# Patient Record
Sex: MALE | Race: WHITE | NOT HISPANIC OR LATINO
[De-identification: names, ages, dates, MRNs, and addresses within clinical notes are randomized per-mention and may not be internally consistent; named-entity substitution may affect disease eponyms.]

---

## 2017-01-04 ENCOUNTER — APPOINTMENT (OUTPATIENT)
Dept: CARDIOTHORACIC SURGERY | Facility: CLINIC | Age: 52
End: 2017-01-04

## 2017-01-04 VITALS
TEMPERATURE: 98.7 F | DIASTOLIC BLOOD PRESSURE: 73 MMHG | HEIGHT: 71 IN | SYSTOLIC BLOOD PRESSURE: 142 MMHG | RESPIRATION RATE: 18 BRPM | OXYGEN SATURATION: 94 % | HEART RATE: 73 BPM | WEIGHT: 280 LBS | BODY MASS INDEX: 39.2 KG/M2

## 2017-04-03 ENCOUNTER — APPOINTMENT (OUTPATIENT)
Dept: HEART AND VASCULAR | Facility: CLINIC | Age: 52
End: 2017-04-03

## 2017-05-12 PROBLEM — Z09 POSTOP CHECK: Status: RESOLVED | Noted: 2017-01-03 | Resolved: 2017-05-12

## 2017-05-12 PROBLEM — G89.18 POSTOPERATIVE PAIN: Status: RESOLVED | Noted: 2017-01-06 | Resolved: 2017-05-12

## 2017-05-16 ENCOUNTER — FORM ENCOUNTER (OUTPATIENT)
Age: 52
End: 2017-05-16

## 2017-05-17 ENCOUNTER — OUTPATIENT (OUTPATIENT)
Dept: OUTPATIENT SERVICES | Facility: HOSPITAL | Age: 52
LOS: 1 days | End: 2017-05-17
Payer: COMMERCIAL

## 2017-05-17 ENCOUNTER — APPOINTMENT (OUTPATIENT)
Dept: CARDIOTHORACIC SURGERY | Facility: CLINIC | Age: 52
End: 2017-05-17

## 2017-05-17 DIAGNOSIS — Z98.890 OTHER SPECIFIED POSTPROCEDURAL STATES: Chronic | ICD-10-CM

## 2017-05-17 DIAGNOSIS — I35.0 NONRHEUMATIC AORTIC (VALVE) STENOSIS: ICD-10-CM

## 2017-05-17 DIAGNOSIS — Z09 ENCOUNTER FOR FOLLOW-UP EXAMINATION AFTER COMPLETED TREATMENT FOR CONDITIONS OTHER THAN MALIGNANT NEOPLASM: ICD-10-CM

## 2017-05-17 DIAGNOSIS — G89.18 OTHER ACUTE POSTPROCEDURAL PAIN: ICD-10-CM

## 2017-05-17 DIAGNOSIS — N62 HYPERTROPHY OF BREAST: Chronic | ICD-10-CM

## 2017-05-17 PROCEDURE — 93306 TTE W/DOPPLER COMPLETE: CPT | Mod: 26

## 2017-05-17 PROCEDURE — 93306 TTE W/DOPPLER COMPLETE: CPT

## 2017-06-13 RX ORDER — IBUPROFEN 600 MG/1
600 TABLET, FILM COATED ORAL
Refills: 0 | Status: COMPLETED | COMMUNITY
Start: 2017-01-04 | End: 2017-06-13

## 2017-10-12 ENCOUNTER — INPATIENT (INPATIENT)
Facility: HOSPITAL | Age: 52
LOS: 1 days | Discharge: HOME | DRG: 247 | End: 2017-10-14
Attending: INTERNAL MEDICINE | Admitting: INTERNAL MEDICINE
Payer: SELF-PAY

## 2017-10-12 VITALS
HEART RATE: 74 BPM | SYSTOLIC BLOOD PRESSURE: 149 MMHG | OXYGEN SATURATION: 98 % | DIASTOLIC BLOOD PRESSURE: 93 MMHG | TEMPERATURE: 98 F | RESPIRATION RATE: 18 BRPM

## 2017-10-12 DIAGNOSIS — Z98.890 OTHER SPECIFIED POSTPROCEDURAL STATES: Chronic | ICD-10-CM

## 2017-10-12 DIAGNOSIS — N62 HYPERTROPHY OF BREAST: Chronic | ICD-10-CM

## 2017-10-12 LAB
ALBUMIN SERPL ELPH-MCNC: 4.2 G/DL — SIGNIFICANT CHANGE UP (ref 3.3–5)
ALP SERPL-CCNC: 89 U/L — SIGNIFICANT CHANGE UP (ref 40–120)
ALT FLD-CCNC: 43 U/L — SIGNIFICANT CHANGE UP (ref 10–45)
ANION GAP SERPL CALC-SCNC: 14 MMOL/L — SIGNIFICANT CHANGE UP (ref 5–17)
APTT BLD: 30.5 SEC — SIGNIFICANT CHANGE UP (ref 27.5–37.4)
AST SERPL-CCNC: 114 U/L — HIGH (ref 10–40)
BASOPHILS NFR BLD AUTO: 0.1 % — SIGNIFICANT CHANGE UP (ref 0–2)
BILIRUB SERPL-MCNC: 0.4 MG/DL — SIGNIFICANT CHANGE UP (ref 0.2–1.2)
BUN SERPL-MCNC: 19 MG/DL — SIGNIFICANT CHANGE UP (ref 7–23)
CALCIUM SERPL-MCNC: 9.4 MG/DL — SIGNIFICANT CHANGE UP (ref 8.4–10.5)
CHLORIDE SERPL-SCNC: 96 MMOL/L — SIGNIFICANT CHANGE UP (ref 96–108)
CK MB CFR SERPL CALC: 60.2 NG/ML — HIGH (ref 0–6.7)
CO2 SERPL-SCNC: 25 MMOL/L — SIGNIFICANT CHANGE UP (ref 22–31)
CREAT SERPL-MCNC: 1.1 MG/DL — SIGNIFICANT CHANGE UP (ref 0.5–1.3)
EOSINOPHIL NFR BLD AUTO: 0.8 % — SIGNIFICANT CHANGE UP (ref 0–6)
GLUCOSE SERPL-MCNC: 120 MG/DL — HIGH (ref 70–99)
HCT VFR BLD CALC: 44.7 % — SIGNIFICANT CHANGE UP (ref 39–50)
HGB BLD-MCNC: 15.2 G/DL — SIGNIFICANT CHANGE UP (ref 13–17)
INR BLD: 1.08 — SIGNIFICANT CHANGE UP (ref 0.88–1.16)
LYMPHOCYTES # BLD AUTO: 17.2 % — SIGNIFICANT CHANGE UP (ref 13–44)
MCHC RBC-ENTMCNC: 28.4 PG — SIGNIFICANT CHANGE UP (ref 27–34)
MCHC RBC-ENTMCNC: 34 G/DL — SIGNIFICANT CHANGE UP (ref 32–36)
MCV RBC AUTO: 83.6 FL — SIGNIFICANT CHANGE UP (ref 80–100)
MONOCYTES NFR BLD AUTO: 9.5 % — SIGNIFICANT CHANGE UP (ref 2–14)
NEUTROPHILS NFR BLD AUTO: 72.4 % — SIGNIFICANT CHANGE UP (ref 43–77)
PLATELET # BLD AUTO: 178 K/UL — SIGNIFICANT CHANGE UP (ref 150–400)
POTASSIUM SERPL-MCNC: 4 MMOL/L — SIGNIFICANT CHANGE UP (ref 3.5–5.3)
POTASSIUM SERPL-SCNC: 4 MMOL/L — SIGNIFICANT CHANGE UP (ref 3.5–5.3)
PROT SERPL-MCNC: 7.5 G/DL — SIGNIFICANT CHANGE UP (ref 6–8.3)
PROTHROM AB SERPL-ACNC: 12 SEC — SIGNIFICANT CHANGE UP (ref 9.8–12.7)
RBC # BLD: 5.35 M/UL — SIGNIFICANT CHANGE UP (ref 4.2–5.8)
RBC # FLD: 12 % — SIGNIFICANT CHANGE UP (ref 10.3–16.9)
SODIUM SERPL-SCNC: 135 MMOL/L — SIGNIFICANT CHANGE UP (ref 135–145)
TROPONIN T SERPL-MCNC: 0.53 NG/ML — CRITICAL HIGH (ref 0–0.01)
WBC # BLD: 12.6 K/UL — HIGH (ref 3.8–10.5)
WBC # FLD AUTO: 12.6 K/UL — HIGH (ref 3.8–10.5)

## 2017-10-12 PROCEDURE — 99223 1ST HOSP IP/OBS HIGH 75: CPT

## 2017-10-12 PROCEDURE — 99285 EMERGENCY DEPT VISIT HI MDM: CPT | Mod: 25

## 2017-10-12 PROCEDURE — 74174 CTA ABD&PLVS W/CONTRAST: CPT | Mod: 26

## 2017-10-12 PROCEDURE — 93010 ELECTROCARDIOGRAM REPORT: CPT

## 2017-10-12 PROCEDURE — 71275 CT ANGIOGRAPHY CHEST: CPT | Mod: 26

## 2017-10-12 RX ORDER — ASPIRIN/CALCIUM CARB/MAGNESIUM 324 MG
325 TABLET ORAL ONCE
Qty: 0 | Refills: 0 | Status: COMPLETED | OUTPATIENT
Start: 2017-10-12 | End: 2017-10-12

## 2017-10-12 RX ORDER — HEPARIN SODIUM 5000 [USP'U]/ML
INJECTION INTRAVENOUS; SUBCUTANEOUS
Qty: 25000 | Refills: 0 | Status: DISCONTINUED | OUTPATIENT
Start: 2017-10-12 | End: 2017-10-13

## 2017-10-12 RX ORDER — ASPIRIN/CALCIUM CARB/MAGNESIUM 324 MG
81 TABLET ORAL DAILY
Qty: 0 | Refills: 0 | Status: DISCONTINUED | OUTPATIENT
Start: 2017-10-13 | End: 2017-10-14

## 2017-10-12 RX ADMIN — Medication 325 MILLIGRAM(S): at 23:20

## 2017-10-12 RX ADMIN — HEPARIN SODIUM 1000 UNIT(S)/HR: 5000 INJECTION INTRAVENOUS; SUBCUTANEOUS at 23:26

## 2017-10-12 NOTE — ED ADULT NURSE NOTE - OBJECTIVE STATEMENT
Pt presents to ED c/o left sided chest pain since 5am. He describes it as "deep, tight muscular pain." associated symptoms include nausea without vomiting. pmh sleep apnea with cpap and aortic aneurysm with repair december 2016.

## 2017-10-12 NOTE — ED PROVIDER NOTE - PROGRESS NOTE DETAILS
noted elevated trop, repeat EKG done, no acute changes to prior.  pt brought to CTA to ro dissection immediately. CTA no acute findings for dissections, pt remains well appearing but still w/ pain, asa given and heparin started, admit for cardiology evaluation relayed to inpatient team that pt needs CPAP for sleep

## 2017-10-12 NOTE — ED PROVIDER NOTE - OBJECTIVE STATEMENT
52 yom pw left sided cp today, rad to back, constant, no modifying factors, nonexertional, no abd pain.  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  hx of htn and sleep apnea.  nonsmoker, no hx of MI.  sx: cp  a/w: no sob  duration: today  quality: pain  location: left chest  radiation: to back  modifying/eliciting factors:  none

## 2017-10-12 NOTE — ED PROVIDER NOTE - MEDICAL DECISION MAKING DETAILS
cp, no sob, hx of aortic root aneurysm sp repair, will CTA r/o dissection, 0-4 hr cardiac panel, low suspicion for ACS

## 2017-10-12 NOTE — ED ADULT NURSE NOTE - CHPI ED SYMPTOMS NEG
no diaphoresis/no shortness of breath/no syncope/no vomiting/no chills/no fever/no cough/no dizziness

## 2017-10-12 NOTE — ED ADULT NURSE NOTE - PMH
HTN (hypertension)    Hyperlipidemia    Obstructive sleep apnea    Pre-diabetes    Thoracic aortic aneurysm without rupture

## 2017-10-13 ENCOUNTER — TRANSCRIPTION ENCOUNTER (OUTPATIENT)
Age: 52
End: 2017-10-13

## 2017-10-13 DIAGNOSIS — I21.4 NON-ST ELEVATION (NSTEMI) MYOCARDIAL INFARCTION: ICD-10-CM

## 2017-10-13 DIAGNOSIS — R74.0 NONSPECIFIC ELEVATION OF LEVELS OF TRANSAMINASE AND LACTIC ACID DEHYDROGENASE [LDH]: ICD-10-CM

## 2017-10-13 DIAGNOSIS — R73.03 PREDIABETES: ICD-10-CM

## 2017-10-13 DIAGNOSIS — G47.33 OBSTRUCTIVE SLEEP APNEA (ADULT) (PEDIATRIC): ICD-10-CM

## 2017-10-13 DIAGNOSIS — Z98.890 OTHER SPECIFIED POSTPROCEDURAL STATES: Chronic | ICD-10-CM

## 2017-10-13 DIAGNOSIS — I10 ESSENTIAL (PRIMARY) HYPERTENSION: ICD-10-CM

## 2017-10-13 LAB
ALBUMIN SERPL ELPH-MCNC: 4 G/DL — SIGNIFICANT CHANGE UP (ref 3.3–5)
ALP SERPL-CCNC: 86 U/L — SIGNIFICANT CHANGE UP (ref 40–120)
ALT FLD-CCNC: 43 U/L — SIGNIFICANT CHANGE UP (ref 10–45)
ANION GAP SERPL CALC-SCNC: 15 MMOL/L — SIGNIFICANT CHANGE UP (ref 5–17)
APTT BLD: 118 SEC — HIGH (ref 27.5–37.4)
APTT BLD: 36.3 SEC — SIGNIFICANT CHANGE UP (ref 27.5–37.4)
AST SERPL-CCNC: 136 U/L — HIGH (ref 10–40)
BILIRUB DIRECT SERPL-MCNC: <0.2 MG/DL — SIGNIFICANT CHANGE UP (ref 0–0.2)
BILIRUB INDIRECT FLD-MCNC: >0.3 MG/DL — SIGNIFICANT CHANGE UP (ref 0.2–1)
BILIRUB SERPL-MCNC: 0.5 MG/DL — SIGNIFICANT CHANGE UP (ref 0.2–1.2)
BUN SERPL-MCNC: 18 MG/DL — SIGNIFICANT CHANGE UP (ref 7–23)
CALCIUM SERPL-MCNC: 9.1 MG/DL — SIGNIFICANT CHANGE UP (ref 8.4–10.5)
CHLORIDE SERPL-SCNC: 98 MMOL/L — SIGNIFICANT CHANGE UP (ref 96–108)
CHOLEST SERPL-MCNC: 197 MG/DL — SIGNIFICANT CHANGE UP (ref 10–199)
CK MB CFR SERPL CALC: 43.4 NG/ML — HIGH (ref 0–6.7)
CK MB CFR SERPL CALC: 50.9 NG/ML — HIGH (ref 0–6.7)
CK SERPL-CCNC: 812 U/L — HIGH (ref 30–200)
CK SERPL-CCNC: 866 U/L — HIGH (ref 30–200)
CO2 SERPL-SCNC: 24 MMOL/L — SIGNIFICANT CHANGE UP (ref 22–31)
CREAT SERPL-MCNC: 0.99 MG/DL — SIGNIFICANT CHANGE UP (ref 0.5–1.3)
EXTRA LAVENDER TOP TUBE: SIGNIFICANT CHANGE UP
GLUCOSE SERPL-MCNC: 155 MG/DL — HIGH (ref 70–99)
HBA1C BLD-MCNC: 5.9 % — HIGH (ref 4–5.6)
HCT VFR BLD CALC: 43.1 % — SIGNIFICANT CHANGE UP (ref 39–50)
HDLC SERPL-MCNC: 41 MG/DL — SIGNIFICANT CHANGE UP (ref 40–125)
HGB BLD-MCNC: 14.8 G/DL — SIGNIFICANT CHANGE UP (ref 13–17)
INR BLD: 1.1 — SIGNIFICANT CHANGE UP (ref 0.88–1.16)
LIPID PNL WITH DIRECT LDL SERPL: 133 MG/DL — HIGH
MAGNESIUM SERPL-MCNC: 2.1 MG/DL — SIGNIFICANT CHANGE UP (ref 1.6–2.6)
MCHC RBC-ENTMCNC: 28.9 PG — SIGNIFICANT CHANGE UP (ref 27–34)
MCHC RBC-ENTMCNC: 34.3 G/DL — SIGNIFICANT CHANGE UP (ref 32–36)
MCV RBC AUTO: 84.2 FL — SIGNIFICANT CHANGE UP (ref 80–100)
PLATELET # BLD AUTO: 164 K/UL — SIGNIFICANT CHANGE UP (ref 150–400)
POTASSIUM SERPL-MCNC: 4 MMOL/L — SIGNIFICANT CHANGE UP (ref 3.5–5.3)
POTASSIUM SERPL-SCNC: 4 MMOL/L — SIGNIFICANT CHANGE UP (ref 3.5–5.3)
PROT SERPL-MCNC: 7 G/DL — SIGNIFICANT CHANGE UP (ref 6–8.3)
PROTHROM AB SERPL-ACNC: 12.2 SEC — SIGNIFICANT CHANGE UP (ref 9.8–12.7)
RBC # BLD: 5.12 M/UL — SIGNIFICANT CHANGE UP (ref 4.2–5.8)
RBC # FLD: 12.2 % — SIGNIFICANT CHANGE UP (ref 10.3–16.9)
SODIUM SERPL-SCNC: 137 MMOL/L — SIGNIFICANT CHANGE UP (ref 135–145)
TOTAL CHOLESTEROL/HDL RATIO MEASUREMENT: 4.8 RATIO — SIGNIFICANT CHANGE UP (ref 3.4–9.6)
TRIGL SERPL-MCNC: 117 MG/DL — SIGNIFICANT CHANGE UP (ref 10–149)
TROPONIN T SERPL-MCNC: 0.61 NG/ML — CRITICAL HIGH (ref 0–0.01)
TROPONIN T SERPL-MCNC: 0.66 NG/ML — CRITICAL HIGH (ref 0–0.01)
TSH SERPL-MCNC: 0.93 UIU/ML — SIGNIFICANT CHANGE UP (ref 0.35–4.94)
WBC # BLD: 12.1 K/UL — HIGH (ref 3.8–10.5)
WBC # FLD AUTO: 12.1 K/UL — HIGH (ref 3.8–10.5)

## 2017-10-13 PROCEDURE — 93458 L HRT ARTERY/VENTRICLE ANGIO: CPT | Mod: 26,XU

## 2017-10-13 PROCEDURE — 92973 PRQ TRLUML C MCHN ASP THRMBC: CPT | Mod: RC

## 2017-10-13 PROCEDURE — 99233 SBSQ HOSP IP/OBS HIGH 50: CPT

## 2017-10-13 PROCEDURE — 92928 PRQ TCAT PLMT NTRAC ST 1 LES: CPT | Mod: RC

## 2017-10-13 PROCEDURE — 93010 ELECTROCARDIOGRAM REPORT: CPT

## 2017-10-13 RX ORDER — METOPROLOL TARTRATE 50 MG
50 TABLET ORAL DAILY
Qty: 0 | Refills: 0 | Status: DISCONTINUED | OUTPATIENT
Start: 2017-10-13 | End: 2017-10-14

## 2017-10-13 RX ORDER — SODIUM CHLORIDE 9 MG/ML
1000 INJECTION INTRAMUSCULAR; INTRAVENOUS; SUBCUTANEOUS
Qty: 0 | Refills: 0 | Status: DISCONTINUED | OUTPATIENT
Start: 2017-10-13 | End: 2017-10-14

## 2017-10-13 RX ORDER — MORPHINE SULFATE 50 MG/1
2 CAPSULE, EXTENDED RELEASE ORAL ONCE
Qty: 0 | Refills: 0 | Status: DISCONTINUED | OUTPATIENT
Start: 2017-10-13 | End: 2017-10-13

## 2017-10-13 RX ORDER — HEPARIN SODIUM 5000 [USP'U]/ML
1300 INJECTION INTRAVENOUS; SUBCUTANEOUS
Qty: 25000 | Refills: 0 | Status: DISCONTINUED | OUTPATIENT
Start: 2017-10-13 | End: 2017-10-13

## 2017-10-13 RX ORDER — TICAGRELOR 90 MG/1
90 TABLET ORAL
Qty: 0 | Refills: 0 | Status: DISCONTINUED | OUTPATIENT
Start: 2017-10-13 | End: 2017-10-14

## 2017-10-13 RX ORDER — TICAGRELOR 90 MG/1
180 TABLET ORAL ONCE
Qty: 0 | Refills: 0 | Status: DISCONTINUED | OUTPATIENT
Start: 2017-10-13 | End: 2017-10-13

## 2017-10-13 RX ORDER — SODIUM CHLORIDE 9 MG/ML
500 INJECTION INTRAMUSCULAR; INTRAVENOUS; SUBCUTANEOUS
Qty: 0 | Refills: 0 | Status: DISCONTINUED | OUTPATIENT
Start: 2017-10-13 | End: 2017-10-13

## 2017-10-13 RX ORDER — ATORVASTATIN CALCIUM 80 MG/1
40 TABLET, FILM COATED ORAL AT BEDTIME
Qty: 0 | Refills: 0 | Status: DISCONTINUED | OUTPATIENT
Start: 2017-10-13 | End: 2017-10-14

## 2017-10-13 RX ORDER — TICAGRELOR 90 MG/1
180 TABLET ORAL ONCE
Qty: 0 | Refills: 0 | Status: COMPLETED | OUTPATIENT
Start: 2017-10-13 | End: 2017-10-13

## 2017-10-13 RX ORDER — HEPARIN SODIUM 5000 [USP'U]/ML
6000 INJECTION INTRAVENOUS; SUBCUTANEOUS EVERY 6 HOURS
Qty: 0 | Refills: 0 | Status: DISCONTINUED | OUTPATIENT
Start: 2017-10-13 | End: 2017-10-13

## 2017-10-13 RX ADMIN — ATORVASTATIN CALCIUM 40 MILLIGRAM(S): 80 TABLET, FILM COATED ORAL at 21:45

## 2017-10-13 RX ADMIN — HEPARIN SODIUM 1300 UNIT(S)/HR: 5000 INJECTION INTRAVENOUS; SUBCUTANEOUS at 04:25

## 2017-10-13 RX ADMIN — HEPARIN SODIUM 6000 UNIT(S): 5000 INJECTION INTRAVENOUS; SUBCUTANEOUS at 04:25

## 2017-10-13 RX ADMIN — TICAGRELOR 90 MILLIGRAM(S): 90 TABLET ORAL at 18:14

## 2017-10-13 RX ADMIN — TICAGRELOR 180 MILLIGRAM(S): 90 TABLET ORAL at 00:53

## 2017-10-13 RX ADMIN — MORPHINE SULFATE 2 MILLIGRAM(S): 50 CAPSULE, EXTENDED RELEASE ORAL at 01:52

## 2017-10-13 RX ADMIN — Medication 81 MILLIGRAM(S): at 06:02

## 2017-10-13 RX ADMIN — Medication 50 MILLIGRAM(S): at 18:14

## 2017-10-13 RX ADMIN — MORPHINE SULFATE 2 MILLIGRAM(S): 50 CAPSULE, EXTENDED RELEASE ORAL at 00:56

## 2017-10-13 RX ADMIN — SODIUM CHLORIDE 75 MILLILITER(S): 9 INJECTION INTRAMUSCULAR; INTRAVENOUS; SUBCUTANEOUS at 13:48

## 2017-10-13 RX ADMIN — SODIUM CHLORIDE 75 MILLILITER(S): 9 INJECTION INTRAMUSCULAR; INTRAVENOUS; SUBCUTANEOUS at 09:33

## 2017-10-13 NOTE — DISCHARGE NOTE ADULT - CARE PROVIDER_API CALL
Melecio Kaba), Internal Medicine  [address] [id number]  Phone: [phone]  Fax: [phone]

## 2017-10-13 NOTE — DISCHARGE NOTE ADULT - CARE PLAN
Principal Discharge DX:	NSTEMI (non-ST elevated myocardial infarction)  Goal:	Please take Aspirin 81 mg daily, Brilinta 90 mg orally twice a day, Lipitor 40 mg daily, Toprol Xl 50 mg daily.  Instructions for follow-up, activity and diet:	You have suffered a heart attack and underwent a cardiac catheterization (10/13/2017). Your Right Coronary Artery was a 100% blocked and was opened up with stent placement.   IT IS VERY IMPORTANT YOU TAKE ASPIRIN 81 MG DAILY AND BRILINTA 90 MG ORALLY TWICE A DAY. Do not stop these two medications unless instructed to do so by your cardiologist! If you stop these medications you are at risk of your stent closing and having a heart attack.  Secondary Diagnosis:	HTN (hypertension)  Goal:	Please continue Toprol XL 50 mg daily.  Secondary Diagnosis:	Hyperlipidemia  Goal:	Please start taking Lipitor 40 mg daily to prevent further plaque build up in your heart arteries.  Secondary Diagnosis:	Hemoglobin A1c above reference range  Instructions for follow-up, activity and diet:	Your Hemoglobin A1c is 5.9%. This number measures your average blood sugar level over the last three months. The normal reference range is 5.6% or less. The Range of 5.7-6.4% clarifies you at a pre diabetic and puts you at higher risk of developing diabetes. Ways to lower this number include: diet, exercise and regular follow up with your doctor.  Secondary Diagnosis:	Obstructive sleep apnea Principal Discharge DX:	NSTEMI (non-ST elevated myocardial infarction)  Goal:	Please take Aspirin (Enteric/stomach coated) 81 mg daily, Brilinta (Ticagrelor) 90 mg orally twice a day, Lipitor (Atorvastatin) 40 mg daily, Toprol XL (Metoprolol Succinate ER) 50 mg daily.  Instructions for follow-up, activity and diet:	You have suffered a heart attack and underwent a cardiac catheterization (10/13/2017). Your Right Coronary Artery was a 100% blocked and was opened up with stent placement.   IT IS VERY IMPORTANT YOU TAKE ASPIRIN 81 MG DAILY AND BRILINTA 90 MG ORALLY TWICE A DAY. Do not stop these two medications unless instructed to do so by your cardiologist! If you stop these medications you are at risk of your stent closing and having a heart attack.  Secondary Diagnosis:	HTN (hypertension)  Goal:	Please continue Toprol XL (Metoprolol Succinate ER) 50 mg daily.  Secondary Diagnosis:	Hyperlipidemia  Goal:	Please start taking Lipitor (Atorvastatin) 40 mg daily to prevent further plaque build up in your heart arteries.  Secondary Diagnosis:	Hemoglobin A1c above reference range  Instructions for follow-up, activity and diet:	Your Hemoglobin A1c is 5.9%. This number measures your average blood sugar level over the last three months. The normal reference range is 5.6% or less. The Range of 5.7-6.4% clarifies you at a pre diabetic and puts you at higher risk of developing diabetes. Ways to lower this number include: diet, exercise and regular follow up with your doctor.  Secondary Diagnosis:	Obstructive sleep apnea  Goal:	Continue using your CPAP at home settings for sleep apnea control Principal Discharge DX:	NSTEMI (non-ST elevated myocardial infarction)  Goal:	Please take Aspirin (Enteric/stomach coated) 81 mg daily, Brilinta (Ticagrelor) 90 mg orally twice a day, Lipitor (Atorvastatin) 40 mg daily, Toprol XL (Metoprolol Succinate ER) 50 mg daily.  Instructions for follow-up, activity and diet:	You have suffered a heart attack and underwent a cardiac catheterization (10/13/2017). Your Right Coronary Artery was a 100% blocked and was opened up with stent placement.   IT IS VERY IMPORTANT YOU TAKE ASPIRIN 81 MG DAILY AND BRILINTA 90 MG ORALLY TWICE A DAY. Do not stop these two medications unless instructed to do so by your cardiologist! If you stop these medications you are at risk of your stent closing and having a heart attack.  Secondary Diagnosis:	HTN (hypertension)  Goal:	Please continue Toprol XL (Metoprolol Succinate ER) 50 mg daily. START taking Lisinopril 2.5mg once daily. These two medications also help keep your heart pumping function strongly.  Secondary Diagnosis:	Hyperlipidemia  Goal:	Please start taking Lipitor (Atorvastatin) 40 mg daily to prevent further plaque build up in your heart arteries.  Secondary Diagnosis:	Hemoglobin A1c above reference range  Instructions for follow-up, activity and diet:	Your Hemoglobin A1c is 5.9%. This number measures your average blood sugar level over the last three months. The normal reference range is 5.6% or less. The Range of 5.7-6.4% clarifies you at a pre diabetic and puts you at higher risk of developing diabetes. Ways to lower this number include: diet, exercise and regular follow up with your doctor.  Secondary Diagnosis:	Obstructive sleep apnea  Goal:	Continue using your CPAP at home settings for sleep apnea control Principal Discharge DX:	NSTEMI (non-ST elevated myocardial infarction)  Goal:	Please take Aspirin (Enteric/stomach coated) 81 mg daily, Brilinta (Ticagrelor) 90 mg orally twice a day, Lipitor (Atorvastatin) 40 mg daily, Toprol XL (Metoprolol Succinate ER) 50 mg daily.  Instructions for follow-up, activity and diet:	You have suffered a heart attack and underwent a cardiac catheterization (10/13/2017). Your Right Coronary Artery was a 100% blocked and was opened up with stent placement.   IT IS VERY IMPORTANT YOU TAKE ASPIRIN 81 MG DAILY AND BRILINTA 90 MG ORALLY TWICE A DAY. Do not stop these two medications unless instructed to do so by your cardiologist! If you stop these medications you are at risk of your stent closing and having a heart attack.    Please  your first month's prescription of Brilinta (Ticagrelor) from the [facility] on [address] ([address] This month will be free. Call your other [facility] 2 weeks before you run out of the medication to make sure they can fill the medication and you don't miss a dose.  all of your other medications at your regular [facility] on [address] today.  Secondary Diagnosis:	HTN (hypertension)  Goal:	Please continue Toprol XL (Metoprolol Succinate ER) 50 mg daily. START taking Lisinopril 2.5mg once daily. These two medications also help keep your heart pumping function strongly.  Secondary Diagnosis:	Hyperlipidemia  Goal:	Please start taking Lipitor (Atorvastatin) 40 mg daily to prevent further plaque build up in your heart arteries.  Secondary Diagnosis:	Hemoglobin A1c above reference range  Instructions for follow-up, activity and diet:	Your Hemoglobin A1c is 5.9%. This number measures your average blood sugar level over the last three months. The normal reference range is 5.6% or less. The Range of 5.7-6.4% clarifies you at a pre diabetic and puts you at higher risk of developing diabetes. Ways to lower this number include: diet, exercise and regular follow up with your doctor.  Secondary Diagnosis:	Obstructive sleep apnea  Goal:	Continue using your CPAP at home settings for sleep apnea control

## 2017-10-13 NOTE — DISCHARGE NOTE ADULT - MEDICATION SUMMARY - MEDICATIONS TO TAKE
I will START or STAY ON the medications listed below when I get home from the hospital:    Aspirin Enteric Coated 81 mg oral delayed release tablet  -- 1 tab(s) by mouth once a day   -- Swallow whole.  Do not crush.  Take with food or milk.    -- Indication: For Heart attack/Coronary artery disease/keeping your stent open    lisinopril 2.5 mg oral tablet  -- 1 tab(s) by mouth once a day  -- Indication: For Blood pressure control/helping your heart to beat strongly    atorvastatin 40 mg oral tablet  -- 1 tab(s) by mouth once a day (at bedtime)  -- Indication: For CHolesterol control    ticagrelor 90 mg oral tablet  -- 1 tab(s) by mouth 2 times a day   -- It is very important that you take or use this exactly as directed.  Do not skip doses or discontinue unless directed by your doctor.  Obtain medical advice before taking any non-prescription drugs as some may affect the action of this medication.    -- Indication: For Heart attack/Coronary Artery Disease/Keeping your stent open    metoprolol succinate 50 mg oral tablet, extended release  -- 1 tab(s) by mouth once a day  -- Indication: For Blood pressure control/Helping your heart to beat strongly

## 2017-10-13 NOTE — H&P ADULT - PROBLEM SELECTOR PLAN 1
- patient reporting 1/10 CP and back pain on arrival to [facility] (reports hx of similar chronic back pain), repeat EKGs unchanged (TWI in AVL), given morphine 2mg IV  - Trop on arrival to ED 0.5, follow up 2AM and AM trops  - follow up AM EKG  - continue heparin gtt started in ED  - ASA/Brilinta loaded on admission, continue home metoprolol, start statin in AM pending lipid panel  - discuss cardiac cath with Dr. Kaba in AM

## 2017-10-13 NOTE — H&P ADULT - NSHPSOCIALHISTORY_GEN_ALL_CORE
Occasional social glass of wine  Occasional marajuana use, denies other illicit drug use  Denies smoking

## 2017-10-13 NOTE — PROGRESS NOTE ADULT - ASSESSMENT
53yo M with FHx CAD (father CAD in 50s) and PMHx of HTN, OLIVA (on CPAP), non-obstructive CAD by cardiac catheterization 2016, pre-DM, aortic root aneurysm with repair 12/2016 presented to [facility] ED (10/12/2017) with unstable angina and R/I NSTEMI. Pt is now s/p cardiac catheterization (10/13/2017) revealing  thrombotic RCA lesion requiring PTCA/FADI mRCA. 51yo M with FHx CAD (father CAD in 50s) and PMHx of HTN, OLIVA (on CPAP), non-obstructive CAD by cardiac catheterization 2016, pre-DM, aortic root aneurysm with repair 12/2016 presented to [facility] ED (10/12/2017) with unstable angina and R/I NSTEMI. Pt is now s/p cardiac catheterization (10/13/2017) revealing 100% thrombotic mRCA lesion with L to R collaterals requiring Thrombectomy/FADI to mRCA.

## 2017-10-13 NOTE — H&P ADULT - ASSESSMENT
Patient is a 51yo M with FHx CAD (father CAD in 50s) and PMHx of HTN, OLIVA (on CPAP) , pre-DM, aortic root aneurysm with repair 12/2016 who presented to [facility] ED on 10/12/17 complaining of 4-5/10 left sided, pressure-like CP radiating to the jaw and back with associated nausea since waking up this morning. Patient ruled in NSTEMI and is admitted to [facility] for further management.

## 2017-10-13 NOTE — CHART NOTE - NSCHARTNOTEFT_GEN_A_CORE
Upon Nutritional Assessment by the Registered Dietitian your patient was determined to meet criteria / has evidence of the following diagnosis/diagnoses:          [ ]  Mild Protein Calorie Malnutrition        [ ]  Moderate Protein Calorie Malnutrition        [ ] Severe Protein Calorie Malnutrition        [ ] Unspecified Protein Calorie Malnutrition        [ ] Underweight / BMI <19        [x ] Morbid Obesity / BMI > 40      Findings as based on:  •  Comprehensive nutrition assessment and consultation        Treatment:    The following diet has been recommended:      PROVIDER Section:     By signing this assessment you are acknowledging and agree with the diagnosis/diagnoses assigned by the Registered Dietitian    Comments:

## 2017-10-13 NOTE — DISCHARGE NOTE ADULT - INSTRUCTIONS
•	Your procedure was done through your groin & your wrist  •	You do not need to keep this area covered and you may shower  •	Please avoid any heavy lifting  (no more than 3 to 5 lbs) or strenuous activity for five days  •	If you develop any swelling, bleeding, hardening of the skin (hematoma formation), acute pain, numbness/tingling  in your arm or leg please contact your doctor immediately or call our 24/7 line: [phone]

## 2017-10-13 NOTE — H&P ADULT - NEGATIVE CARDIOVASCULAR SYMPTOMS
no claudication/no palpitations/no orthopnea/no dyspnea on exertion/no paroxysmal nocturnal dyspnea/no peripheral edema

## 2017-10-13 NOTE — DISCHARGE NOTE ADULT - PLAN OF CARE
Please take Aspirin 81 mg daily, Brilinta 90 mg orally twice a day, Lipitor 40 mg daily, Toprol Xl 50 mg daily. You have suffered a heart attack and underwent a cardiac catheterization (10/13/2017). Your Right Coronary Artery was a 100% blocked and was opened up with stent placement.   IT IS VERY IMPORTANT YOU TAKE ASPIRIN 81 MG DAILY AND BRILINTA 90 MG ORALLY TWICE A DAY. Do not stop these two medications unless instructed to do so by your cardiologist! If you stop these medications you are at risk of your stent closing and having a heart attack. Please continue Toprol XL 50 mg daily. Please start taking Lipitor 40 mg daily to prevent further plaque build up in your heart arteries. Your Hemoglobin A1c is 5.9%. This number measures your average blood sugar level over the last three months. The normal reference range is 5.6% or less. The Range of 5.7-6.4% clarifies you at a pre diabetic and puts you at higher risk of developing diabetes. Ways to lower this number include: diet, exercise and regular follow up with your doctor. Please take Aspirin (Enteric/stomach coated) 81 mg daily, Brilinta (Ticagrelor) 90 mg orally twice a day, Lipitor (Atorvastatin) 40 mg daily, Toprol XL (Metoprolol Succinate ER) 50 mg daily. Please continue Toprol XL (Metoprolol Succinate ER) 50 mg daily. Please start taking Lipitor (Atorvastatin) 40 mg daily to prevent further plaque build up in your heart arteries. Continue using your CPAP at home settings for sleep apnea control Please continue Toprol XL (Metoprolol Succinate ER) 50 mg daily. START taking Lisinopril 2.5mg once daily. These two medications also help keep your heart pumping function strongly. You have suffered a heart attack and underwent a cardiac catheterization (10/13/2017). Your Right Coronary Artery was a 100% blocked and was opened up with stent placement.   IT IS VERY IMPORTANT YOU TAKE ASPIRIN 81 MG DAILY AND BRILINTA 90 MG ORALLY TWICE A DAY. Do not stop these two medications unless instructed to do so by your cardiologist! If you stop these medications you are at risk of your stent closing and having a heart attack.    Please  your first month's prescription of Brilinta (Ticagrelor) from the [facility] on [address] ([address] This month will be free. Call your other [facility] 2 weeks before you run out of the medication to make sure they can fill the medication and you don't miss a dose.  all of your other medications at your regular [facility] on [address] today.

## 2017-10-13 NOTE — PROGRESS NOTE ADULT - PROBLEM SELECTOR PLAN 6
-AST: 114 on admission (Past labs in [facility] show AST level range of 60-80).   -Dr. Kaba aware, continue Lipitor 40 mg daily.   -F/U AM LFTs.    Dispo: Echo in AM. D/C tomorrow.

## 2017-10-13 NOTE — ED ADULT NURSE REASSESSMENT NOTE - NS ED NURSE REASSESS COMMENT FT1
pt transported to [facility] on cardiac monitor without incident. Received by primary RN

## 2017-10-13 NOTE — DIETITIAN INITIAL EVALUATION ADULT. - OTHER INFO
Patient states he enjoys cooking , follows a heart healthy diet , just eats to much , no food allergys , no pain , no n/v/d/c , s/p NSTEMI today  ,  hx pre - dm , HPL , sleep apnea , chest pain  , HBP

## 2017-10-13 NOTE — H&P ADULT - ATTENDING COMMENTS
Agree w History of Present Illness, Allergies/Medications, Patient History, Risk Assessment, Physical Exam, Labs and Results, Assessment and Plan

## 2017-10-13 NOTE — DIETITIAN INITIAL EVALUATION ADULT. - ENERGY NEEDS
Height  5'11 , weight  293# , IBW  172# ,  BMI 40.9 ,  170% IBW , IBW used for calculations   , 120% IBW

## 2017-10-13 NOTE — DISCHARGE NOTE ADULT - PATIENT PORTAL LINK FT
“You can access the Follow[employer] Patient Portal, offered by [facility], by registering with the following website: [email or web address]”

## 2017-10-13 NOTE — H&P ADULT - FAMILY HISTORY
Mother  Still living? Unknown  Family history of brain cancer, Age at diagnosis: Age Unknown     Father  Still living? Unknown  Family history of heart attack, Age at diagnosis: Age Unknown

## 2017-10-13 NOTE — H&P ADULT - HISTORY OF PRESENT ILLNESS
Patient is a 51yo M with FHx CAD (father CAD in 50s) and PMHx of HTN, OLIVA (on CPAP) , pre-DM, aortic root aneurysm with repair 12/2016 who presented to [facility] ED on 10/12/17 complaining of 4-5/10 left sided, pressure-like CP radiating to the jaw and back with associated nausea since waking up this morning. Patient denies fatigue, palpitations, SOB, PND, orthopnea, vomiting, diaphoresis, hematochezia, melena, LE edema, dizziness, syncope. Vitals on arrival to ED were Temp 98F, HR 74bpm, /93, RR 18, O2 sat 98% RA. Labs significant for WBC 12.6 without shift, , trop 0.53, , CKMB 60.2. CT chest/abd/pelvis ruled out dissection (preliminary report). EKG revealed SB @ 57bpm with TWI in AVL. Recent echo 5/2017 revealed mild concentric LVH, EF 55-60. Diagnostic cath prior to aneurysm repair 9/14/16 significant for midRCA 30-50%. In the ED patient received ASA 325mg x 1 and was started on a heparin gtt. Patient is now admitted to [facility] for further management of NSTEMI.   Cardiac Cath 9/14/16 @ [facility]: LM normal, LAD luminal irreg, Ramus luminal irreg, LCx luminal irreg, midRCA 30-50%

## 2017-10-13 NOTE — PROGRESS NOTE ADULT - PROBLEM SELECTOR PLAN 3
- SBP 150s, continue home metoprolol - SBP range: 130-150 mmHG. Continue Toprol XL 50 mg daily.   - Pending renal function, start ACEI therapy in AM.

## 2017-10-13 NOTE — PROGRESS NOTE ADULT - SUBJECTIVE AND OBJECTIVE BOX
Interventional Cardiology PA Adult Progress Note    Subjective Assessment:  	  MEDICATIONS:  metoprolol succinate ER 50 milliGRAM(s) Oral daily            atorvastatin 40 milliGRAM(s) Oral at bedtime    aspirin  chewable 81 milliGRAM(s) Oral daily  heparin  Infusion. 1300 Unit(s)/Hr IV Continuous <Continuous>  heparin  Injectable 6000 Unit(s) IV Push every 6 hours PRN  sodium chloride 0.9%. 500 milliLiter(s) IV Continuous <Continuous>  ticagrelor 90 milliGRAM(s) Oral two times a day      	    [PHYSICAL EXAM:  TELEMETRY:  T(C): 36.2 (10-13-17 @ 09:39), Max: 37.1 (10-13-17 @ 06:50)  HR: 58 (10-13-17 @ 09:52) (56 - 74)  BP: 131/73 (10-13-17 @ 09:52) (131/73 - 155/93)  RR: 16 (10-13-17 @ 09:52) (16 - 18)  SpO2: 96% (10-13-17 @ 09:52) (96% - 99%)  Wt(kg): --  I&O's Summary    12 Oct 2017 07:01  -  13 Oct 2017 07:00  --------------------------------------------------------  IN: 79 mL / OUT: 400 mL / NET: -321 mL    13 Oct 2017 07:01  -  13 Oct 2017 12:44  --------------------------------------------------------  IN: 151 mL / OUT: 0 mL / NET: 151 mL      Height (cm): 180.34 (10-13 @ 00:28)  Weight (kg): 133 (10-13 @ 00:28)  BMI (kg/m2): 40.9 (10-13 @ 00:28)  BSA (m2): 2.48 (10-13 @ 00:28)  Moore:  Central/PICC/Mid Line:                                         Appearance: Normal	  HEENT:   Normal oral mucosa, PERRL, EOMI	  Neck: Supple, + JVD/ - JVD; Carotid Bruit   Cardiovascular: Normal S1 S2, No JVD, No murmurs,   Respiratory: Lungs clear to auscultation/Decreased Breath Sounds/No Rales, Rhonchi, Wheezing	  Gastrointestinal:  Soft, Non-tender, + BS	  Skin: No rashes, No ecchymoses, No cyanosis  Extremities: Normal range of motion, No clubbing, cyanosis or edema  Vascular: Peripheral pulses palpable 2+ bilaterally  Neurologic: Non-focal  Psychiatry: A & O x 3, Mood & affect appropriate      	    ECG:  	  RADIOLOGY:   DIAGNOSTIC TESTING:  [ ] Echocardiogram:  [ ]  Catheterization:  [ ] Stress Test:    [ ] LISSY  OTHER: 	    LABS:	 	                                     14.8   12.1  )-----------( 164      ( 13 Oct 2017 07:29 )             43.1     10-13    137  |  98  |  18  ----------------------------<  155<H>  4.0   |  24  |  0.99    Ca    9.1      13 Oct 2017 07:29  Mg     2.1     10-13    TPro  7.5  /  Alb  4.2  /  TBili  0.4  /  DBili  x   /  AST  114<H>  /  ALT  43  /  AlkPhos  89  10-12       HgA1c: Hemoglobin A1C, Whole Blood: 5.9 % (10-13 @ 08:07)    TSH: Thyroid Stimulating Hormone, Serum: 0.933 uIU/mL (10-13 @ 07:29)    PT/INR - ( 13 Oct 2017 07:29 )   PT: 12.2 sec;   INR: 1.10          PTT - ( 13 Oct 2017 07:29 )  PTT:118.0 sec    ASSESSMENT/PLAN: 	        DVT ppx:  Dispo: Interventional Cardiology PA Adult Progress Note    Subjective Assessment: Pt seen and examined at bedside this morning. Pt denies chest pain, palpitations, dizziness, SOB, fever, chills.   	  MEDICATIONS:  metoprolol succinate ER 50 milliGRAM(s) Oral daily  atorvastatin 40 milliGRAM(s) Oral at bedtime  aspirin  chewable 81 milliGRAM(s) Oral daily  heparin  Infusion. 1300 Unit(s)/Hr IV Continuous <Continuous>  heparin  Injectable 6000 Unit(s) IV Push every 6 hours PRN  sodium chloride 0.9%. 500 milliLiter(s) IV Continuous <Continuous>  ticagrelor 90 milliGRAM(s) Oral two times a day  	  [PHYSICAL EXAM:  TELEMETRY: No events on telemetry.   T(C): 36.2 (10-13-17 @ 09:39), Max: 37.1 (10-13-17 @ 06:50)  HR: 58 (10-13-17 @ 09:52) (56 - 74)  BP: 131/73 (10-13-17 @ 09:52) (131/73 - 155/93)  RR: 16 (10-13-17 @ 09:52) (16 - 18)  SpO2: 96% (10-13-17 @ 09:52) (96% - 99%)  Wt(kg): --  I&O's Summary    12 Oct 2017 07:01  -  13 Oct 2017 07:00  --------------------------------------------------------  IN: 79 mL / OUT: 400 mL / NET: -321 mL    13 Oct 2017 07:01  -  13 Oct 2017 12:44  --------------------------------------------------------  IN: 151 mL / OUT: 0 mL / NET: 151 mL      Height (cm): 180.34 (10-13 @ 00:28)  Weight (kg): 133 (10-13 @ 00:28)  BMI (kg/m2): 40.9 (10-13 @ 00:28)  BSA (m2): 2.48 (10-13 @ 00:28)    Gen: NAD, obese male, resting comfortable in bed  Neck: No JVD B/l  Cardiac: +S1, S2, RRR, healed sternotomy scar  Pulm: CTA b/l  Abdomen: BS present, obese, NT  Extremities: No C/C/E b/l  Neuro: A+Ox3, no focal deficits         LABS:	 	                               14.8   12.1  )-----------( 164      ( 13 Oct 2017 07:29 )             43.1     10-13    137  |  98  |  18  ----------------------------<  155<H>  4.0   |  24  |  0.99    Ca    9.1      13 Oct 2017 07:29  Mg     2.1     10-13    TPro  7.5  /  Alb  4.2  /  TBili  0.4  /  DBili  x   /  AST  114<H>  /  ALT  43  /  AlkPhos  89  10-12       HgA1c: Hemoglobin A1C, Whole Blood: 5.9 % (10-13 @ 08:07)    TSH: Thyroid Stimulating Hormone, Serum: 0.933 uIU/mL (10-13 @ 07:29)    PT/INR - ( 13 Oct 2017 07:29 )   PT: 12.2 sec;   INR: 1.10          PTT - ( 13 Oct 2017 07:29 )  PTT:118.0 sec    ASSESSMENT/PLAN: 	        DVT ppx:  Dispo:

## 2017-10-13 NOTE — H&P ADULT - PROBLEM SELECTOR PLAN 2
- equal femoral pulses b/l, BP in bilateral arms equal, CTA chest/abd/pelvis negative for dissection

## 2017-10-13 NOTE — DIETITIAN INITIAL EVALUATION ADULT. - NS AS NUTRI INTERV ED CONTENT
receptive to nutrition ed on the dash tlc diet/Purpose of the nutrition education/Nutrition relationship to health/disease/Survival information

## 2017-10-13 NOTE — DISCHARGE NOTE ADULT - HOSPITAL COURSE
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 Pt was admitted to [facility] for ACS protocol at which time ASA/Brilinta load was given and heparin gtt was started. CTA Dissection Protocol on admission: No evidence of aortic dissection or aneurysmal dilatation. - equal femoral pulses b/l, BP in bilateral arms equal, CTA chest/abd/pelvis negative for dissection  Pt is now s/p cardiac catheterization (10/13/2017) revealing LM normal, 30% pLAD stenosis, distal LCx luminal irregularities, mid 100% thrombotic RCA lesion with L to R collaterals. LVEF 55%, basal inferior hypokinesis, LVEDP 14 mmHG, 24 mm gradient across the aortic valve: mild to moderate aortic stenosis. Pt received Thrombectomy/FADI to mRCA. Procedure was done via dual injection: Radial/Perclose placement.  Echo (10/14/2017) revealed (INSERT FINDINGS)    Patient has been seen and examined at bedside this morning. Pt with no complaints and is out of bed, ambulating. Right radial access stable: no hematoma, no bleed, 2+ radial pulse. Right groin access s/p perclose placement: stable, no hematoma, distal pulse intact. Lab values have been reviewed and have remained stable overnight. Telemetry and vital signs with no acute events overnight. Discharge medication region discussed with Dr. Kaba this morning and patient will be taking ASA/Brilinta, Lipitor 40 mg daily, Toprol XL 50 mg daily and Lisinopril 5 mg daily (CONFIRM).    Patient has been deemed stable for discharge at this time per Dr. Kaba and will follow up with Dr. Kaba in one week for a post PCI check up. Patient has been education on important of medication compliance, diet and exercise. All medications have been E-Prescribed to patient’s pharmacy. 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 Pt was admitted to [facility] for ACS protocol at which time ASA/Brilinta load was given and heparin gtt was started. CTA Dissection Protocol on admission: No evidence of aortic dissection or aneurysmal dilatation. - equal femoral pulses b/l, BP in bilateral arms equal, CTA chest/abd/pelvis negative for dissection  Pt is now s/p cardiac catheterization (10/13/2017) revealing LM normal, 30% pLAD stenosis, distal LCx luminal irregularities, mid 100% thrombotic RCA lesion with L to R collaterals. LVEF 55%, basal inferior hypokinesis, LVEDP 14 mmHG, 24 mm gradient across the aortic valve: mild to moderate aortic stenosis. [facility] Procedure was done via dual injection: Radial/Perclose placement.     Patient has been seen and examined at bedside this morning. Pt with no complaints and is out of bed, ambulating. Right radial access stable: no hematoma, no bleed, 2+ radial pulse. Right groin access s/p perclose placement: stable, no hematoma, distal pulse intact. Lab values have been reviewed and have remained stable overnight. Telemetry and vital signs with no acute events overnight. Discharge medication region discussed with Dr. Kaba this morning and patient will be taking ASA/Brilinta, Lipitor 40 mg daily, Toprol XL 50 mg daily and Lisinopril 2.5mg daily.     Patient has been deemed stable for discharge at this time per Dr. Kaba and will follow up with Dr. Kaba in one week for a post PCI check up. Patient has been education on important of medication compliance, diet and exercise. All medications have been E-Prescribed to patient’s pharmacy. 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 Pt was admitted to [facility] for ACS protocol at which time ASA/Brilinta load was given and heparin gtt was started. CTA Dissection Protocol on admission: No evidence of aortic dissection or aneurysmal dilatation. - equal femoral pulses b/l, BP in bilateral arms equal, CTA chest/abd/pelvis negative for dissection  Pt is now s/p cardiac catheterization (10/13/2017) revealing LM normal, 30% pLAD stenosis, distal LCx luminal irregularities, mid 100% thrombotic RCA lesion with L to R collaterals. LVEF 55%, basal inferior hypokinesis, LVEDP 14 mmHG, 24 mm gradient across the aortic valve: mild to moderate aortic stenosis. Pt received Thrombectomy/FADI to mRCA. Procedure was done via dual injection: Radial/Perclose placement.     Patient has been seen and examined at bedside this morning. Pt with no complaints and is out of bed, ambulating. Right radial access stable: no hematoma, no bleed, 2+ radial pulse. Right groin access s/p perclose placement: stable, no hematoma, distal pulse intact. Lab values have been reviewed and have remained stable overnight. Telemetry and vital signs with no acute events overnight. Discharge medication region discussed with Dr. Kaba this morning and patient will be taking ASA/Brilinta, Lipitor 40 mg daily, Toprol XL 50 mg daily and Lisinopril 2.5mg daily. Pt was placed on Lipitor 40mg daily at bedtime because LFTs mildly elevated on admission and during hospitalization.     Patient has been deemed stable for discharge at this time per Dr. Kaba and will follow up with Dr. Kaba in one week for a post PCI check up. Patient has been education on important of medication compliance, diet and exercise. All medications have been E-Prescribed to patient’s pharmacy. 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 ED (10/12/2017) with unstable angina and R/I NSTEMI. Pt was admitted to [facility] for ACS protocol at which time ASA/Brilinta load was given and heparin gtt was started. CTA Dissection Protocol on admission: No evidence of aortic dissection or aneurysmal dilatation. - equal femoral pulses b/l, BP in bilateral arms equal, CTA chest/abd/pelvis negative for dissection  Pt is now s/p cardiac catheterization (10/13/2017) revealing LM normal, 30% pLAD stenosis, distal LCx luminal irregularities, mid 100% thrombotic RCA lesion with L to R collaterals. LVEF 55%, basal inferior hypokinesis, LVEDP 14 mmHG, 24 mm gradient across the aortic valve: mild to moderate aortic stenosis. Pt received Thrombectomy/FADI to mRCA. Procedure was done via dual injection: Radial/Perclose placement. Echo on 10/14/17 shows EF 55%. No valve disease.     Patient has been seen and examined at bedside this morning. Pt with no complaints and is out of bed, ambulating. Right radial access stable: no hematoma, no bleed, 2+ radial pulse. Right groin access s/p perclose placement: stable, no hematoma, distal pulse intact. Lab values have been reviewed and have remained stable overnight. Telemetry and vital signs with no acute events overnight. Discharge medication region discussed with Dr. Kaba this morning and patient will be taking ASA/Brilinta, Lipitor 40 mg daily, Toprol XL 50 mg daily and Lisinopril 2.5mg daily. Pt was placed on Lipitor 40mg daily at bedtime because LFTs mildly elevated on admission and during hospitalization.     Patient has been deemed stable for discharge at this time per Dr. Kaba and will follow up with Dr. Kaba in one week for a post PCI check up. Patient has been education on important of medication compliance, diet and exercise. All medications have been E-Prescribed to patient’s pharmacy. Free one month's supply sent to [facility] on [address] because pt's other [facility] does not have Brilinta in stock.

## 2017-10-13 NOTE — PROGRESS NOTE ADULT - PROBLEM SELECTOR PLAN 1
- patient reporting 1/10 CP and back pain on arrival to [facility] (reports hx of similar chronic back pain), repeat EKGs unchanged (TWI in AVL), given morphine 2mg IV  - Trop on arrival to ED 0.5, follow up 2AM and AM trops  - follow up AM EKG  - continue heparin gtt started in ED  - ASA/Brilinta loaded on admission, continue home metoprolol, start statin in AM pending lipid panel  - discuss cardiac cath with Dr. Kaba in AM -Troponin did not peak prior to cardiac catheterization today: 1st: 0.053, 2nd: 0.61, 3rd: 0.66.   s/p cardiac catheterization (10/13/2017): LM normal, 30% pLAD stenosis, distal LCx luminal irregularities, mid 100% thrombotic RCA lesion with L to R collaterals. LVEF 55%, basal inferior hypokinesis, LVEDP 14 mmHG, 24 mm gradient across the aortic valve: mild to moderate aortic stenosis. Intervention: Thrombectomy/FADI to mRCA. (Dual injection: Radial/Perclose placement).   -Continue ASA/Brilinta 90 mg orally BID, Lipitor 40 mg daily, Toprol XL 50 mg daily. To initiate ACEI therapy pending renal function in AM (Received 220 ml of contrast dose and dye load day prior for CTA Dissection rule out)  -Echo (5/2017): mild concentric LVH, LVEF 55-60%.  Needs Echo before discharge tomorrow (pt was in cath lab majority of day).

## 2017-10-13 NOTE — PROGRESS NOTE ADULT - PROBLEM SELECTOR PLAN 2
- equal femoral pulses b/l, BP in bilateral arms equal, CTA chest/abd/pelvis negative for dissection -History of Aortic Root Aneurysm repair 12/2016  -CTA Dissection Protocol on admission: No evidence of aortic dissection or aneurysmal dilatation.

## 2017-10-14 VITALS — TEMPERATURE: 98 F

## 2017-10-14 LAB
ALBUMIN SERPL ELPH-MCNC: 3.3 G/DL — SIGNIFICANT CHANGE UP (ref 3.3–5)
ALP SERPL-CCNC: 71 U/L — SIGNIFICANT CHANGE UP (ref 40–120)
ALT FLD-CCNC: 40 U/L — SIGNIFICANT CHANGE UP (ref 10–45)
ANION GAP SERPL CALC-SCNC: 14 MMOL/L — SIGNIFICANT CHANGE UP (ref 5–17)
AST SERPL-CCNC: 97 U/L — HIGH (ref 10–40)
BILIRUB DIRECT SERPL-MCNC: <0.2 MG/DL — SIGNIFICANT CHANGE UP (ref 0–0.2)
BILIRUB INDIRECT FLD-MCNC: >0.5 MG/DL — SIGNIFICANT CHANGE UP (ref 0.2–1)
BILIRUB SERPL-MCNC: 0.7 MG/DL — SIGNIFICANT CHANGE UP (ref 0.2–1.2)
BUN SERPL-MCNC: 18 MG/DL — SIGNIFICANT CHANGE UP (ref 7–23)
CALCIUM SERPL-MCNC: 9 MG/DL — SIGNIFICANT CHANGE UP (ref 8.4–10.5)
CHLORIDE SERPL-SCNC: 98 MMOL/L — SIGNIFICANT CHANGE UP (ref 96–108)
CO2 SERPL-SCNC: 23 MMOL/L — SIGNIFICANT CHANGE UP (ref 22–31)
CREAT SERPL-MCNC: 1.1 MG/DL — SIGNIFICANT CHANGE UP (ref 0.5–1.3)
GLUCOSE SERPL-MCNC: 117 MG/DL — HIGH (ref 70–99)
HCT VFR BLD CALC: 40.4 % — SIGNIFICANT CHANGE UP (ref 39–50)
HGB BLD-MCNC: 13.9 G/DL — SIGNIFICANT CHANGE UP (ref 13–17)
MAGNESIUM SERPL-MCNC: 2.2 MG/DL — SIGNIFICANT CHANGE UP (ref 1.6–2.6)
MCHC RBC-ENTMCNC: 29.4 PG — SIGNIFICANT CHANGE UP (ref 27–34)
MCHC RBC-ENTMCNC: 34.4 G/DL — SIGNIFICANT CHANGE UP (ref 32–36)
MCV RBC AUTO: 85.4 FL — SIGNIFICANT CHANGE UP (ref 80–100)
PLATELET # BLD AUTO: 162 K/UL — SIGNIFICANT CHANGE UP (ref 150–400)
POTASSIUM SERPL-MCNC: 3.9 MMOL/L — SIGNIFICANT CHANGE UP (ref 3.5–5.3)
POTASSIUM SERPL-SCNC: 3.9 MMOL/L — SIGNIFICANT CHANGE UP (ref 3.5–5.3)
PROT SERPL-MCNC: 6.6 G/DL — SIGNIFICANT CHANGE UP (ref 6–8.3)
RBC # BLD: 4.73 M/UL — SIGNIFICANT CHANGE UP (ref 4.2–5.8)
RBC # FLD: 12.4 % — SIGNIFICANT CHANGE UP (ref 10.3–16.9)
SODIUM SERPL-SCNC: 135 MMOL/L — SIGNIFICANT CHANGE UP (ref 135–145)
WBC # BLD: 9.9 K/UL — SIGNIFICANT CHANGE UP (ref 3.8–10.5)
WBC # FLD AUTO: 9.9 K/UL — SIGNIFICANT CHANGE UP (ref 3.8–10.5)

## 2017-10-14 PROCEDURE — 93306 TTE W/DOPPLER COMPLETE: CPT | Mod: 26

## 2017-10-14 PROCEDURE — 99239 HOSP IP/OBS DSCHRG MGMT >30: CPT

## 2017-10-14 RX ORDER — ASPIRIN/CALCIUM CARB/MAGNESIUM 324 MG
1 TABLET ORAL
Qty: 30 | Refills: 11
Start: 2017-10-14 | End: 2018-10-08

## 2017-10-14 RX ORDER — LISINOPRIL 2.5 MG/1
1 TABLET ORAL
Qty: 30 | Refills: 3
Start: 2017-10-14 | End: 2018-02-10

## 2017-10-14 RX ORDER — TICAGRELOR 90 MG/1
1 TABLET ORAL
Qty: 60 | Refills: 0 | OUTPATIENT
Start: 2017-10-14 | End: 2017-11-13

## 2017-10-14 RX ORDER — TICAGRELOR 90 MG/1
1 TABLET ORAL
Qty: 60 | Refills: 10 | OUTPATIENT
Start: 2017-10-14 | End: 2018-09-08

## 2017-10-14 RX ORDER — LISINOPRIL 2.5 MG/1
2.5 TABLET ORAL DAILY
Qty: 0 | Refills: 0 | Status: DISCONTINUED | OUTPATIENT
Start: 2017-10-14 | End: 2017-10-14

## 2017-10-14 RX ORDER — ATORVASTATIN CALCIUM 80 MG/1
1 TABLET, FILM COATED ORAL
Qty: 30 | Refills: 3
Start: 2017-10-14 | End: 2018-02-10

## 2017-10-14 RX ORDER — TICAGRELOR 90 MG/1
1 TABLET ORAL
Qty: 60 | Refills: 11 | OUTPATIENT
Start: 2017-10-14 | End: 2018-10-08

## 2017-10-14 RX ADMIN — Medication 81 MILLIGRAM(S): at 11:03

## 2017-10-14 RX ADMIN — LISINOPRIL 2.5 MILLIGRAM(S): 2.5 TABLET ORAL at 11:58

## 2017-10-14 RX ADMIN — Medication 50 MILLIGRAM(S): at 07:07

## 2017-10-14 RX ADMIN — TICAGRELOR 90 MILLIGRAM(S): 90 TABLET ORAL at 07:08

## 2017-10-16 PROCEDURE — C1894: CPT

## 2017-10-16 PROCEDURE — 80048 BASIC METABOLIC PNL TOTAL CA: CPT

## 2017-10-16 PROCEDURE — C1769: CPT

## 2017-10-16 PROCEDURE — 94660 CPAP INITIATION&MGMT: CPT

## 2017-10-16 PROCEDURE — 83735 ASSAY OF MAGNESIUM: CPT

## 2017-10-16 PROCEDURE — 80061 LIPID PANEL: CPT

## 2017-10-16 PROCEDURE — 71275 CT ANGIOGRAPHY CHEST: CPT

## 2017-10-16 PROCEDURE — 84484 ASSAY OF TROPONIN QUANT: CPT

## 2017-10-16 PROCEDURE — 80076 HEPATIC FUNCTION PANEL: CPT

## 2017-10-16 PROCEDURE — 85025 COMPLETE CBC W/AUTO DIFF WBC: CPT

## 2017-10-16 PROCEDURE — 96374 THER/PROPH/DIAG INJ IV PUSH: CPT | Mod: XU

## 2017-10-16 PROCEDURE — C1760: CPT

## 2017-10-16 PROCEDURE — 80053 COMPREHEN METABOLIC PANEL: CPT

## 2017-10-16 PROCEDURE — 85027 COMPLETE CBC AUTOMATED: CPT

## 2017-10-16 PROCEDURE — 84443 ASSAY THYROID STIM HORMONE: CPT

## 2017-10-16 PROCEDURE — 93005 ELECTROCARDIOGRAM TRACING: CPT | Mod: 76

## 2017-10-16 PROCEDURE — 93306 TTE W/DOPPLER COMPLETE: CPT

## 2017-10-16 PROCEDURE — C1725: CPT

## 2017-10-16 PROCEDURE — 83036 HEMOGLOBIN GLYCOSYLATED A1C: CPT

## 2017-10-16 PROCEDURE — 82553 CREATINE MB FRACTION: CPT

## 2017-10-16 PROCEDURE — 82550 ASSAY OF CK (CPK): CPT

## 2017-10-16 PROCEDURE — 85610 PROTHROMBIN TIME: CPT

## 2017-10-16 PROCEDURE — 99285 EMERGENCY DEPT VISIT HI MDM: CPT | Mod: 25

## 2017-10-16 PROCEDURE — 74174 CTA ABD&PLVS W/CONTRAST: CPT

## 2017-10-16 PROCEDURE — C1757: CPT

## 2017-10-16 PROCEDURE — C1874: CPT

## 2017-10-16 PROCEDURE — 36415 COLL VENOUS BLD VENIPUNCTURE: CPT

## 2017-10-16 PROCEDURE — C1887: CPT

## 2017-10-16 PROCEDURE — 85730 THROMBOPLASTIN TIME PARTIAL: CPT

## 2017-10-17 DIAGNOSIS — R73.03 PREDIABETES: ICD-10-CM

## 2017-10-17 DIAGNOSIS — E78.5 HYPERLIPIDEMIA, UNSPECIFIED: ICD-10-CM

## 2017-10-17 DIAGNOSIS — G47.33 OBSTRUCTIVE SLEEP APNEA (ADULT) (PEDIATRIC): ICD-10-CM

## 2017-10-17 DIAGNOSIS — F12.10 CANNABIS ABUSE, UNCOMPLICATED: ICD-10-CM

## 2017-10-17 DIAGNOSIS — I21.4 NON-ST ELEVATION (NSTEMI) MYOCARDIAL INFARCTION: ICD-10-CM

## 2017-10-17 DIAGNOSIS — I35.0 NONRHEUMATIC AORTIC (VALVE) STENOSIS: ICD-10-CM

## 2017-10-17 DIAGNOSIS — I10 ESSENTIAL (PRIMARY) HYPERTENSION: ICD-10-CM

## 2017-10-17 DIAGNOSIS — I25.110 ATHEROSCLEROTIC HEART DISEASE OF NATIVE CORONARY ARTERY WITH UNSTABLE ANGINA PECTORIS: ICD-10-CM

## 2017-10-17 DIAGNOSIS — Z80.8 FAMILY HISTORY OF MALIGNANT NEOPLASM OF OTHER ORGANS OR SYSTEMS: ICD-10-CM

## 2017-10-17 DIAGNOSIS — Z88.8 ALLERGY STATUS TO OTHER DRUGS, MEDICAMENTS AND BIOLOGICAL SUBSTANCES: ICD-10-CM

## 2017-10-17 DIAGNOSIS — Z88.1 ALLERGY STATUS TO OTHER ANTIBIOTIC AGENTS STATUS: ICD-10-CM

## 2017-10-17 DIAGNOSIS — Z82.49 FAMILY HISTORY OF ISCHEMIC HEART DISEASE AND OTHER DISEASES OF THE CIRCULATORY SYSTEM: ICD-10-CM

## 2017-10-20 DIAGNOSIS — E66.01 MORBID (SEVERE) OBESITY DUE TO EXCESS CALORIES: ICD-10-CM

## 2018-01-31 ENCOUNTER — APPOINTMENT (OUTPATIENT)
Dept: HEART AND VASCULAR | Facility: CLINIC | Age: 53
End: 2018-01-31
Payer: COMMERCIAL

## 2018-01-31 VITALS
OXYGEN SATURATION: 97 % | WEIGHT: 273.99 LBS | BODY MASS INDEX: 38.79 KG/M2 | HEIGHT: 70.47 IN | DIASTOLIC BLOOD PRESSURE: 90 MMHG | SYSTOLIC BLOOD PRESSURE: 142 MMHG | HEART RATE: 63 BPM | TEMPERATURE: 98.4 F

## 2018-01-31 PROCEDURE — 93000 ELECTROCARDIOGRAM COMPLETE: CPT

## 2018-01-31 PROCEDURE — 99214 OFFICE O/P EST MOD 30 MIN: CPT | Mod: 25

## 2018-02-28 ENCOUNTER — APPOINTMENT (OUTPATIENT)
Dept: CARDIOTHORACIC SURGERY | Facility: CLINIC | Age: 53
End: 2018-02-28
Payer: COMMERCIAL

## 2018-02-28 VITALS
HEART RATE: 67 BPM | SYSTOLIC BLOOD PRESSURE: 173 MMHG | TEMPERATURE: 97.5 F | OXYGEN SATURATION: 96 % | WEIGHT: 263 LBS | BODY MASS INDEX: 37.23 KG/M2 | RESPIRATION RATE: 18 BRPM | DIASTOLIC BLOOD PRESSURE: 88 MMHG

## 2018-02-28 PROCEDURE — 99214 OFFICE O/P EST MOD 30 MIN: CPT

## 2018-03-01 RX ORDER — METFORMIN HYDROCHLORIDE 500 MG/1
500 TABLET, COATED ORAL
Qty: 180 | Refills: 0 | Status: COMPLETED | COMMUNITY
Start: 2017-01-04 | End: 2018-03-01

## 2018-03-01 RX ORDER — COLCHICINE 0.6 MG/1
0.6 TABLET ORAL DAILY
Qty: 30 | Refills: 2 | Status: COMPLETED | COMMUNITY
Start: 2017-01-04 | End: 2018-03-01

## 2018-04-19 ENCOUNTER — APPOINTMENT (OUTPATIENT)
Dept: HEART AND VASCULAR | Facility: CLINIC | Age: 53
End: 2018-04-19
Payer: COMMERCIAL

## 2018-04-19 PROCEDURE — 93306 TTE W/DOPPLER COMPLETE: CPT

## 2018-04-26 ENCOUNTER — APPOINTMENT (OUTPATIENT)
Dept: HEART AND VASCULAR | Facility: CLINIC | Age: 53
End: 2018-04-26
Payer: COMMERCIAL

## 2018-04-26 VITALS
OXYGEN SATURATION: 98 % | WEIGHT: 271 LBS | HEART RATE: 79 BPM | DIASTOLIC BLOOD PRESSURE: 90 MMHG | SYSTOLIC BLOOD PRESSURE: 150 MMHG | TEMPERATURE: 97.9 F | BODY MASS INDEX: 38.36 KG/M2 | HEIGHT: 70.47 IN

## 2018-04-26 PROCEDURE — 99401 PREV MED CNSL INDIV APPRX 15: CPT | Mod: 25

## 2018-04-26 PROCEDURE — 99214 OFFICE O/P EST MOD 30 MIN: CPT | Mod: 25

## 2018-04-26 PROCEDURE — 93000 ELECTROCARDIOGRAM COMPLETE: CPT

## 2018-04-26 RX ORDER — TICAGRELOR 90 MG/1
90 TABLET ORAL TWICE DAILY
Qty: 180 | Refills: 3 | Status: COMPLETED | COMMUNITY
Start: 2018-01-03 | End: 2018-04-26

## 2018-04-30 ENCOUNTER — APPOINTMENT (OUTPATIENT)
Dept: HEART AND VASCULAR | Facility: CLINIC | Age: 53
End: 2018-04-30

## 2018-08-02 ENCOUNTER — APPOINTMENT (OUTPATIENT)
Dept: HEART AND VASCULAR | Facility: CLINIC | Age: 53
End: 2018-08-02
Payer: COMMERCIAL

## 2018-08-02 VITALS
WEIGHT: 286.01 LBS | TEMPERATURE: 98.4 F | DIASTOLIC BLOOD PRESSURE: 85 MMHG | HEART RATE: 74 BPM | OXYGEN SATURATION: 99 % | HEIGHT: 70.47 IN | SYSTOLIC BLOOD PRESSURE: 150 MMHG | BODY MASS INDEX: 40.49 KG/M2

## 2018-08-02 PROCEDURE — 93000 ELECTROCARDIOGRAM COMPLETE: CPT

## 2018-08-02 PROCEDURE — 99214 OFFICE O/P EST MOD 30 MIN: CPT

## 2018-08-08 NOTE — PATIENT PROFILE ADULT. - NS PRO MODE OF ARRIVAL
Subjective





- Date & Time of Evaluation


Date of Evaluation: 08/08/18


Time of Evaluation: 07:15





- Subjective


Subjective: 





Patient seen and examined this morning at bedside, NAD. No acute event overnight

, Chest pain and SOB resolved this morning, no acute event overnight. Patient 

is tolerating PO and ambulating. States Tongue numbness has improved. Patient 

refused her Lovenox yesterday b/c its painful and reports spotting yesterday.   





Objective





- Vital Signs/Intake and Output


Vital Signs (last 24 hours): 


 











Temp Pulse Resp BP Pulse Ox


 


 97.6 F   80   20   120/81   98 


 


 08/08/18 08:00  08/08/18 08:00  08/08/18 08:00  08/08/18 08:00  08/08/18 08:00











- Medications


Medications: 


 Current Medications





Acetaminophen (Tylenol 325mg Tab)  650 mg PO Q4 PRN


   PRN Reason: Pain, Mild (1-3)


   Last Admin: 08/07/18 21:34 Dose:  650 mg


Benzocaine/Menthol (Cepacol Sore Throat)  1 cheyenne PO Q2 PRN


   PRN Reason: Sore Throat


   Last Admin: 08/07/18 22:50 Dose:  1 chey[place]


Enoxaparin Sodium (Lovenox)  70 mg SC Q12 АНДРЕЙ


   PRN Reason: Protocol


   Last Admin: 08/07/18 21:39 Dose:  Not Given


Metoprolol Succinate (Toprol Xl)  25 mg PO DAILY [facility]


   Last Admin: 08/07/18 17:12 Dose:  25 mg


Polyethylene Glycol (Miralax)  17 gm PO DAILYWM [facility]


Senna/Docusate Sodium (Senokot S 50 Mg-8.6 Mg)  2 tab PO HS [facility]


   Last Admin: 08/07/18 21:35 Dose:  2 tab


Tramadol HCl (Ultram)  50 mg PO Q6 PRN


   PRN Reason: Pain, moderate (4-7)











- Labs


Labs: 


 





 08/06/18 16:15 





 08/06/18 16:15 





 











PT  12.0 Seconds (9.8-13.1)   08/06/18  16:15    


 


INR  1.1   08/06/18  16:15    


 


APTT  34.0 Seconds (25.6-37.1)   08/06/18  16:15    














- Constitutional


Appears: No Acute Distress





- Head Exam


Head Exam: NORMAL INSPECTION





- Eye Exam


Eye Exam: EOMI, Normal appearance, PERRL


Pupil Exam: NORMAL ACCOMODATION





- ENT Exam


ENT Exam: Mucous Membranes Moist





- Neck Exam


Neck Exam: Full ROM, Normal Inspection





- Respiratory Exam


Respiratory Exam: Clear to Ausculation Bilateral, NORMAL BREATHING PATTERN.  

absent: Accessory Muscle Use, Chest Wall Tenderness, Decreased Breath Sounds, 

Prolonged Expiratory Phase, Rales, Rhonchi, Wheezes





- Cardiovascular Exam


Cardiovascular Exam: REGULAR RHYTHM, +S1, +S2





- GI/Abdominal Exam


GI & Abdominal Exam: Soft, Tenderness (At lap surgical site ), Normal Bowel 

Sounds.  absent: Distended, Guarding, Rigid, Hernia, Organomegaly, Pulsatile 

Mass, Rebound





- Extremities Exam


Extremities Exam: Full ROM, Normal Capillary Refill, Normal Inspection.  absent

: Calf Tenderness, Joint Swelling, Pedal Edema, Tenderness





- Back Exam


Back Exam: NORMAL INSPECTION.  absent: CVA tenderness (L), CVA tenderness (R)





- Neurological Exam


Neurological Exam: Alert, Awake, CN II-XII Intact, Normal Gait, Oriented x3


Neuro motor strength exam: Left Upper Extremity: 5, Right Upper Extremity: 5, 

Left Lower Extremity: 5, Right Lower Extremity: 5





- Psychiatric Exam


Psychiatric exam: Normal Affect, Normal Mood





- Skin


Skin Exam: Dry, Intact, Normal Color, Warm





Assessment and Plan





- Assessment and Plan (Free Text)


Assessment: 





A/P:


49 y/o female with a PMH of RA, ovarian cyst s/p laparoscopic ovarian cyst 

surgery performed on 7/31/2018 admitted for possible pulmonary embolism. 





Chest pain, Dyspnea, possibly due to pulmonary embolism, s/p surgery, r/o other 

possibilities  


- Acute


- CT chest : There is a small area of diminished contrast enhancement within 

segmental branch right upper lobe which could represent volume averaging 

artifact however the possibility of a small pulmonary embolus cannot be 

excluded although the former is favored.


- Consult Dr. Foster, pulmonary, recommendations appreciated: Follow up V/Q 

scan  


- Consult, Dr. Knight, cardiology Recommendations appreciated: Non-cardiac 

chest pain    


- Doppler U/S LEs: negative for acute DVT , Echo: EF 55-60%, normal LVF (8/7/18)


- C/W Therapeutic lovenox 68 mg Q12 (Patient refused last dose due to vaginal 

spotting)


- Follow up V/Q scan and Hypercoag work up 





Numbness of anterior portion of tongue  and hoarseness of voice 


- Improved 


- possibly secondary to post intubation nerve injury


- Normal Mg, Phos, B12, Folate and TSH 





Tachycardia on ambulation


- Started on Metoprolol 25mg daily PO





DVT Prophylaxis


- Therapeutic lovenox 68 mg Q12





Code Status


- full code ambulatory

## 2018-12-02 ENCOUNTER — EMERGENCY (EMERGENCY)
Facility: HOSPITAL | Age: 53
LOS: 1 days | Discharge: HOME | End: 2018-12-02
Attending: EMERGENCY MEDICINE | Admitting: EMERGENCY MEDICINE
Payer: COMMERCIAL

## 2018-12-02 VITALS
OXYGEN SATURATION: 98 % | DIASTOLIC BLOOD PRESSURE: 69 MMHG | HEART RATE: 63 BPM | SYSTOLIC BLOOD PRESSURE: 177 MMHG | RESPIRATION RATE: 18 BRPM

## 2018-12-02 VITALS
DIASTOLIC BLOOD PRESSURE: 80 MMHG | OXYGEN SATURATION: 98 % | HEIGHT: 71 IN | HEART RATE: 64 BPM | RESPIRATION RATE: 19 BRPM | TEMPERATURE: 98 F | WEIGHT: 259.93 LBS | SYSTOLIC BLOOD PRESSURE: 153 MMHG

## 2018-12-02 DIAGNOSIS — Z79.82 LONG TERM (CURRENT) USE OF ASPIRIN: ICD-10-CM

## 2018-12-02 DIAGNOSIS — R13.10 DYSPHAGIA, UNSPECIFIED: ICD-10-CM

## 2018-12-02 DIAGNOSIS — R42 DIZZINESS AND GIDDINESS: ICD-10-CM

## 2018-12-02 DIAGNOSIS — Z79.899 OTHER LONG TERM (CURRENT) DRUG THERAPY: ICD-10-CM

## 2018-12-02 DIAGNOSIS — N62 HYPERTROPHY OF BREAST: Chronic | ICD-10-CM

## 2018-12-02 DIAGNOSIS — R51 HEADACHE: ICD-10-CM

## 2018-12-02 DIAGNOSIS — I10 ESSENTIAL (PRIMARY) HYPERTENSION: ICD-10-CM

## 2018-12-02 DIAGNOSIS — R11.0 NAUSEA: ICD-10-CM

## 2018-12-02 DIAGNOSIS — Z98.890 OTHER SPECIFIED POSTPROCEDURAL STATES: Chronic | ICD-10-CM

## 2018-12-02 DIAGNOSIS — Z88.8 ALLERGY STATUS TO OTHER DRUGS, MEDICAMENTS AND BIOLOGICAL SUBSTANCES: ICD-10-CM

## 2018-12-02 DIAGNOSIS — E78.5 HYPERLIPIDEMIA, UNSPECIFIED: ICD-10-CM

## 2018-12-02 DIAGNOSIS — Z88.1 ALLERGY STATUS TO OTHER ANTIBIOTIC AGENTS STATUS: ICD-10-CM

## 2018-12-02 LAB
ALBUMIN SERPL ELPH-MCNC: 4.3 G/DL — SIGNIFICANT CHANGE UP (ref 3.3–5)
ALP SERPL-CCNC: 83 U/L — SIGNIFICANT CHANGE UP (ref 40–120)
ALT FLD-CCNC: 81 U/L — HIGH (ref 10–45)
ANION GAP SERPL CALC-SCNC: 16 MMOL/L — SIGNIFICANT CHANGE UP (ref 5–17)
APTT BLD: 24.8 SEC — LOW (ref 27.5–36.3)
AST SERPL-CCNC: 45 U/L — HIGH (ref 10–40)
BASOPHILS NFR BLD AUTO: 0.1 % — SIGNIFICANT CHANGE UP (ref 0–2)
BILIRUB SERPL-MCNC: 0.2 MG/DL — SIGNIFICANT CHANGE UP (ref 0.2–1.2)
BUN SERPL-MCNC: 29 MG/DL — HIGH (ref 7–23)
CALCIUM SERPL-MCNC: 9.6 MG/DL — SIGNIFICANT CHANGE UP (ref 8.4–10.5)
CHLORIDE SERPL-SCNC: 99 MMOL/L — SIGNIFICANT CHANGE UP (ref 96–108)
CO2 SERPL-SCNC: 24 MMOL/L — SIGNIFICANT CHANGE UP (ref 22–31)
CREAT SERPL-MCNC: 1.08 MG/DL — SIGNIFICANT CHANGE UP (ref 0.5–1.3)
EOSINOPHIL NFR BLD AUTO: 1.8 % — SIGNIFICANT CHANGE UP (ref 0–6)
GLUCOSE SERPL-MCNC: 143 MG/DL — HIGH (ref 70–99)
HCT VFR BLD CALC: 42.1 % — SIGNIFICANT CHANGE UP (ref 39–50)
HGB BLD-MCNC: 14.4 G/DL — SIGNIFICANT CHANGE UP (ref 13–17)
INR BLD: 1.08 — SIGNIFICANT CHANGE UP (ref 0.88–1.16)
LYMPHOCYTES # BLD AUTO: 18.3 % — SIGNIFICANT CHANGE UP (ref 13–44)
MCHC RBC-ENTMCNC: 29.6 PG — SIGNIFICANT CHANGE UP (ref 27–34)
MCHC RBC-ENTMCNC: 34.2 G/DL — SIGNIFICANT CHANGE UP (ref 32–36)
MCV RBC AUTO: 86.4 FL — SIGNIFICANT CHANGE UP (ref 80–100)
MONOCYTES NFR BLD AUTO: 8.9 % — SIGNIFICANT CHANGE UP (ref 2–14)
NEUTROPHILS NFR BLD AUTO: 70.9 % — SIGNIFICANT CHANGE UP (ref 43–77)
PLATELET # BLD AUTO: 167 K/UL — SIGNIFICANT CHANGE UP (ref 150–400)
POTASSIUM SERPL-MCNC: 4.1 MMOL/L — SIGNIFICANT CHANGE UP (ref 3.5–5.3)
POTASSIUM SERPL-SCNC: 4.1 MMOL/L — SIGNIFICANT CHANGE UP (ref 3.5–5.3)
PROT SERPL-MCNC: 7.6 G/DL — SIGNIFICANT CHANGE UP (ref 6–8.3)
PROTHROM AB SERPL-ACNC: 12.2 SEC — SIGNIFICANT CHANGE UP (ref 10–12.9)
RBC # BLD: 4.87 M/UL — SIGNIFICANT CHANGE UP (ref 4.2–5.8)
RBC # FLD: 12.4 % — SIGNIFICANT CHANGE UP (ref 10.3–16.9)
SODIUM SERPL-SCNC: 139 MMOL/L — SIGNIFICANT CHANGE UP (ref 135–145)
TROPONIN T SERPL-MCNC: <0.01 NG/ML — SIGNIFICANT CHANGE UP (ref 0–0.01)
WBC # BLD: 10.9 K/UL — HIGH (ref 3.8–10.5)
WBC # FLD AUTO: 10.9 K/UL — HIGH (ref 3.8–10.5)

## 2018-12-02 PROCEDURE — 93005 ELECTROCARDIOGRAM TRACING: CPT

## 2018-12-02 PROCEDURE — 0042T: CPT

## 2018-12-02 PROCEDURE — 70496 CT ANGIOGRAPHY HEAD: CPT

## 2018-12-02 PROCEDURE — 82962 GLUCOSE BLOOD TEST: CPT

## 2018-12-02 PROCEDURE — 99291 CRITICAL CARE FIRST HOUR: CPT | Mod: 25

## 2018-12-02 PROCEDURE — 85730 THROMBOPLASTIN TIME PARTIAL: CPT

## 2018-12-02 PROCEDURE — 85610 PROTHROMBIN TIME: CPT

## 2018-12-02 PROCEDURE — 36415 COLL VENOUS BLD VENIPUNCTURE: CPT

## 2018-12-02 PROCEDURE — 80053 COMPREHEN METABOLIC PANEL: CPT

## 2018-12-02 PROCEDURE — 70450 CT HEAD/BRAIN W/O DYE: CPT

## 2018-12-02 PROCEDURE — 85025 COMPLETE CBC W/AUTO DIFF WBC: CPT

## 2018-12-02 PROCEDURE — 70450 CT HEAD/BRAIN W/O DYE: CPT | Mod: 26,59

## 2018-12-02 PROCEDURE — 99291 CRITICAL CARE FIRST HOUR: CPT

## 2018-12-02 PROCEDURE — 70496 CT ANGIOGRAPHY HEAD: CPT | Mod: 26

## 2018-12-02 PROCEDURE — 84484 ASSAY OF TROPONIN QUANT: CPT

## 2018-12-02 PROCEDURE — 70498 CT ANGIOGRAPHY NECK: CPT

## 2018-12-02 PROCEDURE — 70498 CT ANGIOGRAPHY NECK: CPT | Mod: 26

## 2018-12-02 PROCEDURE — 93010 ELECTROCARDIOGRAM REPORT: CPT

## 2018-12-02 NOTE — ED ADULT NURSE NOTE - CHPI ED NUR SYMPTOMS NEG
no blurred vision/no confusion/no vomiting/no change in level of consciousness/no weakness/no dizziness/no loss of consciousness/no fever/no nausea/no numbness

## 2018-12-02 NOTE — ED PROVIDER NOTE - MEDICAL DECISION MAKING DETAILS
stroke code called on arrival for tia / cva symptoms.  initial ct head neg for acute findings.  ct perfusion and cta head/ neck obtained to r/o cva.  no signs or symptoms of infection clinically.  no chest pain to suggest acs.  case discussed with stroke attending.  already took aspirin pta.  tpa not given as symptoms resolved at time of evaluation. stroke code called on arrival for tia / cva symptoms.  initial ct head neg for acute findings.  ct perfusion and cta head/ neck obtained to r/o cva.  no signs or symptoms of infection clinically.  no chest pain to suggest acs.  case discussed with stroke attending.  already took aspirin pta.  tpa not given as symptoms resolved at time of evaluation.  suspect complex migraine.  per homa Jeffery to [place] for further workup outpatient.  return precautions discussed

## 2018-12-02 NOTE — ED PROVIDER NOTE - CRITICAL CARE INDICATION, MLM
patient was critically ill... Patient was critically ill with a high probability of imminent or life threatening deterioration. Patient was critically ill with a high probability of imminent or life threatening deterioration. tia/cva symptoms

## 2018-12-02 NOTE — ED ADULT NURSE NOTE - NSIMPLEMENTINTERV_GEN_ALL_ED
Implemented All Universal Safety Interventions:  [place] to call system. Call bell, personal items and telephone within reach. Instruct patient to call for assistance. Room bathroom lighting operational. Non-slip footwear when patient is off stretcher. Physically safe environment: no spills, clutter or unnecessary equipment. Stretcher in lowest position, wheels locked, appropriate side rails in place.

## 2018-12-02 NOTE — ED ADULT TRIAGE NOTE - OTHER COMPLAINTS
CC of headache starts at R side of the forehead and radiating all over the head. at 0110 pt is experiencing vision on the R side of the eye and walking wabbly and feels that the room is spinning. this occasion happened twice today and second time was 0145H but on a shorter duration and feels that his face is having spasm. currently no drift on the extremity, symmetrical smile, bilateral arm and leg strength 5/5, talks in full sentences. Sx Hx open heart surgery and thoracic aorta aneurysm and MI

## 2018-12-02 NOTE — CONSULT NOTE ADULT - SUBJECTIVE AND OBJECTIVE BOX
**STROKE CODE CONSULT NOTE**    Last known well time/Time of onset of symptoms:    HPI:53M PMH Thoracic Aortic Aneurysm s/p repair(2016), CAD w/ PCI(2017), HTN who p/w dizziness, right sided facial symptoms, and difficulty swallowing. Symptoms started at approximately 2am and lasted a few minutes. Patient then experienced another episode of similar duration. Has never happened before. Has otherwise been in usual state of health. Currently asymptomatic     PAST MEDICAL & SURGICAL HISTORY:  Pre-diabetes  Thoracic aortic aneurysm without rupture  Hyperlipidemia  Obstructive sleep apnea  HTN (hypertension)  History of aortic aneurysm repair  Gynecomastia, male  H/O arthroscopy of left knee    FAMILY HISTORY:  Family history of heart attack (Father)  Family history of brain cancer (Mother)      SOCIAL HISTORY: social alcohol use, no drug use, no smoking    ROS:  Constitutional: No fever, weight loss or fatigue  Eyes: No eye pain, visual disturbances, or discharge  ENMT:  No difficulty hearing, tinnitus, vertigo; No sinus or throat pain  Neck: No pain or stiffness  Respiratory: No cough, wheezing, chills or hemoptysis  Cardiovascular: No chest pain, palpitations, shortness of breath, dizziness or leg swelling  Gastrointestinal: No abdominal pain. No nausea, vomiting or hematemesis; No diarrhea or constipation. Nohematochezia.  Genitourinary: No dysuria, frequency, hematuria or incontinence  Neurological: As per HPI  Skin: No itching, burning, rashes or lesions   Endocrine: No heat or cold intolerance; No hair loss  Musculoskeletal: No joint pain or swelling; No muscle, back or extremity pain  Psychiatric: No depression, anxiety, mood swings or difficulty sleeping  Heme/Lymph: No easy bruising or bleeding gums    MEDICATIONS  (STANDING):    MEDICATIONS  (PRN):      Allergies    erythromycin (Unknown)  tetanus toxoids (Fever)    Intolerances        Vital Signs Last 24 Hrs  T(C): 36.5 (02 Dec 2018 02:39), Max: 36.5 (02 Dec 2018 02:39)  T(F): 97.7 (02 Dec 2018 02:39), Max: 97.7 (02 Dec 2018 02:39)  HR: 64 (02 Dec 2018 02:39) (64 - 64)  BP: 153/80 (02 Dec 2018 02:39) (153/80 - 153/80)  BP(mean): --  RR: 19 (02 Dec 2018 02:39) (19 - 19)  SpO2: 98% (02 Dec 2018 02:39) (98% - 98%)    PHYSICAL EXAM:  Constitutional: WDWN; NAD  Cardiovascular: RRR, no appreciable murmurs; no carotid bruits  Neurologic:  Mental status: Awake, alert and oriented x3.  Recent and remote memory intact.  Naming, repetition and comprehension intact.  Attention/concentration intact.  No dysarthria, no aphasia.  Fund of knowledge appropriate.    Cranial nerves: Fundoscopic exam demonstrated no abnormalities, pupils equally round and reactive to light, visual fields full, no nystagmus, extraocular muscles intact, V1 through V3 intact bilaterally and symmetric, face symmetric, hearing intact to finger rub, palate elevation symmetric, tongue was midline, sternocleidomastoid/shoulder shrug strength bilaterally 5/5.    Motor:  Normal bulk and tone, strength 5/5 in bilateral upper and lower extremities.   strength 5/5.  Rapid alternating movements intact and symmetric.   Sensation: Intact to light touch, proprioception, vibration, temperature, pinprick.  No neglect.   Coordination: No dysmetria on finger-to-nose and heel-to-shin.  No clumsiness.  Reflexes: 2+ in upper and lower extremities, absent Babinski bilaterally  Gait: Narrow and steady. No ataxia.  Romberg negative    NIHSS: 0    Fingerstick Blood Glucose: CAPILLARY BLOOD GLUCOSE      POCT Blood Glucose.: 147 mg/dL (02 Dec 2018 02:55)       LABS:                        14.4   10.9  )-----------( 167      ( 02 Dec 2018 03:16 )             42.1           PT/INR - ( 02 Dec 2018 03:16 )   PT: 12.2 sec;   INR: 1.08          PTT - ( 02 Dec 2018 03:16 )  PTT:24.8 sec          RADIOLOGY & ADDITIONAL STUDIES:    IV-tPA (Y/N):   N                                Bolus time:  Reason IV-tPA not given: Low suspicion of acute stroke    ASSESSMENT/PLAN:    #R/O CVA: symptoms appear more consistent with cluster headache  -CT/CTA/CT perfusion preliminarily negative  -NIHSS 0, no measurable deficits, asymptomatic  -ABCD 1   -already on aspirin and statin  -low suspicion of acute stroke/TIA and patient currently asymptomatic with NIHSS 0 so outpatient workup is appropriate at this time. Can follow up with Dr. Sharpe or Dr. Coello on Monday for further evaluation

## 2018-12-02 NOTE — ED PROVIDER NOTE - OBJECTIVE STATEMENT
here with right sided headache starting around 1:15 am associated with spasm/tightening of right side of face, difficulty swallowing, and dizziness with nausea.  Dizziness described as motion sickness.  Lasted about 30 min then subsided.  Thought he would wait til am to see how he felt but then recurred again, although more mild, and decided to be evaluated.  No fever, trauma, vomiting, loss of vision.  History of aortic aneurysm repair, htn, high cholesterol.  No recent changes in medications.  No alcohol or drug use today.  Currently symptoms resolved except mild headache

## 2018-12-02 NOTE — ED ADULT NURSE NOTE - OBJECTIVE STATEMENT
Patient presents to the ED with c/o HA s/p 2 episodes R eye blurred vision, R sided facial weakness and unsteady gait beginning around 0130a and has since resolved. Patient denies CP or SOB. Currently HA persists, no vision loss or weakness reported or noted at this time.

## 2019-03-03 ENCOUNTER — TRANSCRIPTION ENCOUNTER (OUTPATIENT)
Age: 54
End: 2019-03-03

## 2019-03-19 ENCOUNTER — FORM ENCOUNTER (OUTPATIENT)
Age: 54
End: 2019-03-19

## 2019-03-20 ENCOUNTER — OUTPATIENT (OUTPATIENT)
Dept: OUTPATIENT SERVICES | Facility: HOSPITAL | Age: 54
LOS: 1 days | End: 2019-03-20
Payer: COMMERCIAL

## 2019-03-20 ENCOUNTER — APPOINTMENT (OUTPATIENT)
Dept: CARDIOTHORACIC SURGERY | Facility: CLINIC | Age: 54
End: 2019-03-20
Payer: COMMERCIAL

## 2019-03-20 VITALS
WEIGHT: 286 LBS | BODY MASS INDEX: 40.94 KG/M2 | TEMPERATURE: 98.4 F | HEART RATE: 62 BPM | SYSTOLIC BLOOD PRESSURE: 167 MMHG | OXYGEN SATURATION: 98 % | HEIGHT: 70 IN | DIASTOLIC BLOOD PRESSURE: 90 MMHG | RESPIRATION RATE: 16 BRPM

## 2019-03-20 DIAGNOSIS — I77.810 THORACIC AORTIC ECTASIA: ICD-10-CM

## 2019-03-20 DIAGNOSIS — Z98.890 OTHER SPECIFIED POSTPROCEDURAL STATES: Chronic | ICD-10-CM

## 2019-03-20 DIAGNOSIS — N62 HYPERTROPHY OF BREAST: Chronic | ICD-10-CM

## 2019-03-20 DIAGNOSIS — Z09 ENCOUNTER FOR FOLLOW-UP EXAMINATION AFTER COMPLETED TREATMENT FOR CONDITIONS OTHER THAN MALIGNANT NEOPLASM: ICD-10-CM

## 2019-03-20 PROCEDURE — 93306 TTE W/DOPPLER COMPLETE: CPT

## 2019-03-20 PROCEDURE — 99213 OFFICE O/P EST LOW 20 MIN: CPT

## 2019-03-20 PROCEDURE — 93306 TTE W/DOPPLER COMPLETE: CPT | Mod: 26

## 2019-03-20 RX ORDER — LISINOPRIL 10 MG/1
10 TABLET ORAL DAILY
Qty: 90 | Refills: 3 | Status: DISCONTINUED | COMMUNITY
Start: 2018-01-03 | End: 2019-03-20

## 2019-03-20 RX ORDER — METOPROLOL TARTRATE 50 MG/1
50 TABLET, FILM COATED ORAL DAILY
Qty: 30 | Refills: 3 | Status: DISCONTINUED | COMMUNITY
Start: 2017-06-13 | End: 2019-03-20

## 2019-03-20 NOTE — HISTORY OF PRESENT ILLNESS
[FreeTextEntry1] : 54 year old male with a past medical history of HTN, HLD, pre-diabetes, OLIVA, status post valve sparing aortic root replacement, ascending and hemiarch replacement  on 12/19/16, and presents for a follow up visit. \par \par Echocardiogram today revealed: The left ventricular ejection fraction is 60%. No evidence for any hemodynamically significant valvular disease. Graft material noted in aortic root, and ascending aorta. The inferior vena cava is normal in size (<2.1 cm) with normal inspiratory collapse (>50%) consistent with normal right atrial pressure. \par \par Patient is doing well and denies recent hospitalization, ER visits, or surgeries. He  denies fever, chills, fatigue, headache, blurred vision, dizziness, syncope, chest pain, palpitations, shortness of breath, orthopnea, paroxysmal nocturnal dyspnea, nausea, vomiting, abdominal pain, back pain, BRBPR or swelling to legs.\par \par Of note, patient has discontinued all his antihypertensive medications secondary to cough (ACE inhibitor). \par

## 2019-03-20 NOTE — PROCEDURE
[FreeTextEntry1] : Dr. Stevenson discussed activity restrictions with the patient, and would advise exercise at a moderate amount with no heavy lifting over one third of body weight, and avoiding heart rates that exceed 140 beats per minute. In addition, every patient should abstain from tobacco abuse and to avoid all illicit drug use, especially stimulants such as cocaine or methamphetamine. Dr. Stevenson also counseled regarding maintaining a healthy heart diet, and losing any excessive weight as this also put undue stress on both the aorta and entire cardiovascular system. First degree family members should be screened for bicuspid valve disease, and ascending aortic aneurysms. \par \par

## 2019-03-20 NOTE — PHYSICAL EXAM
[Sclera] : the sclera and conjunctiva were normal [PERRL With Normal Accommodation] : pupils were equal in size, round, and reactive to light [Neck Appearance] : the appearance of the neck was normal [] : no respiratory distress [Apical Impulse] : the apical impulse was normal [Examination Of The Chest] : the chest was normal in appearance [2+] : left 2+ [Bowel Sounds] : normal bowel sounds [Abdomen Soft] : soft [Cervical Lymph Nodes Enlarged Anterior Bilaterally] : anterior cervical [No CVA Tenderness] : no ~M costovertebral angle tenderness [Abnormal Walk] : normal gait [Musculoskeletal - Swelling] : no joint swelling seen [Skin Color & Pigmentation] : normal skin color and pigmentation [Skin Turgor] : normal skin turgor [Sensation] : the sensory exam was normal to light touch and pinprick [Deep Tendon Reflexes (DTR)] : deep tendon reflexes were 2+ and symmetric [Motor Exam] : the motor exam was normal [Oriented To Time, Place, And Person] : oriented to person, place, and time [FreeTextEntry1] : deferred

## 2019-03-20 NOTE — DATA REVIEWED
[FreeTextEntry1] : CTA chest 2/27/18 revealed no evidence of aortic repair leak, stenosis, or dissection. \par \par Echocardiogram 5/17/17 revealed EF 55-60%, no AR, no MR, no TR.\par \par Echocardiogram 3/20/19\par There is moderate concentric left ventricular hypertrophy.\par Abnormal (paradoxical) septal motion consistent with post-operative status\par Normal left ventricular systolic function with akinesis of basal inferior wall.\par The left ventricular ejection fraction is normal.\par The left ventricular ejection fraction is 60%.\par The right ventricle is normal in size and function.\par The left atrial size is normal.\par Right atrial size is normal.\par No evidence for any hemodynamically significant valvular disease.\par There was insufficient TR detected from which to calculate pulmonary artery systolic pressure.\par There is no pericardial effusion.

## 2019-03-20 NOTE — CONSULT LETTER
[FreeTextEntry2] : Melecio Kaba M.D.  \par [address]\[place] [address]\par  [FreeTextEntry1] : I had the pleasure of seeing your patient, JL GRANADOS, in my office today. \par \par We take a multidisciplinary team approach to patient care and consider you, the referring physician, an extension of our team. We will maintain an open line of communication with you throughout your patient's treatment course.  \par \par As you recall, he is a 54 year old male status post valve sparing aortic root replacement, ascending and hemiarch replacement  on 12/19/16. The patient presents to the office today for a routine follow up visit with repeat diagnostic imaging. I have enclosed a copy for your records.\par \par The surgical repair is intact and stable. Therefore, I have recommended that the patient will follow up in the [facility] in 2 years with an echocardiogram to monitor his surgical repair. My office will assist the patient with his upcoming appointment and I will update you on his  progress at that time.\par \par I have discussed with the patient that we will continue to monitor his aortic pathology closely at the [facility] the [facility] [place] [facility], that encompasses the entire health care system and is one of the largest in the nation at this point.\par \par I appreciate the opportunity to care for your patient at the [facility] [place] [facility] based at [facility]. If there are any questions or concerns, please call me directly at [phone]. \par \par Please see my note below. \par \par \par Sincerely, \par \par \par \par \par \par \par Jonatan Stevenson M.D.\par Professor of Cardiovascular and Thoracic Surgery\par Minimally Invasive Valve Surgeon\par Director of Aortic Surgery, [facility] [place] [facility]\par Cell: [phone]\par Email: [email or web address] \par \par [facility]:\par [address] [place] [address], [address]th Floor, [place] [address]\par Office: [phone]\par Fax: [phone]\par \par [facility]:\par Department of Cardiovascular and Thoracic Surgery\par [address] [place] [address], [address]\par Office: [phone]\par Fax: [phone]\par \par Practice Manager: Ms. Felicitas Ashley\par Email: [email or web address]\par Phone: [phone]\par

## 2019-03-20 NOTE — ASSESSMENT
[FreeTextEntry1] : 54 year old male with a past medical history of HTN, HLD, pre-diabetes, OLIVA, status post valve sparing aortic root replacement, ascending and hemiarch replacement  on 12/19/16, and presents for a follow up visit. \par \par Echocardiogram today revealed: The left ventricular ejection fraction is 60%. No evidence for any hemodynamically significant valvular disease. Graft material noted in aortic root, and ascending aorta. The inferior vena cava is normal in size (<2.1 cm) with normal inspiratory collapse (>50%) consistent with normal right atrial pressure. \par \par I have reviewed the patient's medical records, diagnostic images during the time of this office consultation and have made the following recommendation. The surgical repair is intact and stable.\par Plan\par \par 1. Follow up in [facility] in 2 years with an echocardiogram. \par 2. Continue medication regimen.\par 3. Follow up with cardiologist and PCP.\par 4. Blood pressure management. Of note, patient's blood pressure was noted to be elevated at this visit - 167/90 HR 62. I have recommended the patient to monitor his blood pressure closely.  I have asked the patient to follow up with you for medication adjustment.

## 2019-04-30 ENCOUNTER — MEDICATION RENEWAL (OUTPATIENT)
Age: 54
End: 2019-04-30

## 2019-05-08 ENCOUNTER — INPATIENT (INPATIENT)
Facility: HOSPITAL | Age: 54
LOS: 12 days | Discharge: NURSING, REHAB OR LONG-TERM CARE | DRG: 40 | End: 2019-05-21
Attending: PSYCHIATRY & NEUROLOGY | Admitting: PSYCHIATRY & NEUROLOGY
Payer: COMMERCIAL

## 2019-05-08 VITALS
HEART RATE: 72 BPM | RESPIRATION RATE: 17 BRPM | DIASTOLIC BLOOD PRESSURE: 112 MMHG | SYSTOLIC BLOOD PRESSURE: 161 MMHG | OXYGEN SATURATION: 99 % | TEMPERATURE: 99 F

## 2019-05-08 DIAGNOSIS — R63.8 OTHER SYMPTOMS AND SIGNS CONCERNING FOOD AND FLUID INTAKE: ICD-10-CM

## 2019-05-08 DIAGNOSIS — I63.9 CEREBRAL INFARCTION, UNSPECIFIED: ICD-10-CM

## 2019-05-08 DIAGNOSIS — Z98.890 OTHER SPECIFIED POSTPROCEDURAL STATES: Chronic | ICD-10-CM

## 2019-05-08 DIAGNOSIS — E78.5 HYPERLIPIDEMIA, UNSPECIFIED: ICD-10-CM

## 2019-05-08 DIAGNOSIS — G47.33 OBSTRUCTIVE SLEEP APNEA (ADULT) (PEDIATRIC): ICD-10-CM

## 2019-05-08 DIAGNOSIS — R73.03 PREDIABETES: ICD-10-CM

## 2019-05-08 DIAGNOSIS — N62 HYPERTROPHY OF BREAST: Chronic | ICD-10-CM

## 2019-05-08 DIAGNOSIS — I71.2 THORACIC AORTIC ANEURYSM, WITHOUT RUPTURE: ICD-10-CM

## 2019-05-08 DIAGNOSIS — R13.10 DYSPHAGIA, UNSPECIFIED: ICD-10-CM

## 2019-05-08 DIAGNOSIS — I10 ESSENTIAL (PRIMARY) HYPERTENSION: ICD-10-CM

## 2019-05-08 DIAGNOSIS — I65.01 OCCLUSION AND STENOSIS OF RIGHT VERTEBRAL ARTERY: ICD-10-CM

## 2019-05-08 DIAGNOSIS — I25.10 ATHEROSCLEROTIC HEART DISEASE OF NATIVE CORONARY ARTERY WITHOUT ANGINA PECTORIS: ICD-10-CM

## 2019-05-08 LAB
ANION GAP SERPL CALC-SCNC: 10 MMOL/L — SIGNIFICANT CHANGE UP (ref 5–17)
APTT BLD: 51.8 SEC — HIGH (ref 27.5–36.3)
BLD GP AB SCN SERPL QL: NEGATIVE — SIGNIFICANT CHANGE UP
BUN SERPL-MCNC: 16 MG/DL — SIGNIFICANT CHANGE UP (ref 7–23)
CALCIUM SERPL-MCNC: 9.3 MG/DL — SIGNIFICANT CHANGE UP (ref 8.4–10.5)
CHLORIDE SERPL-SCNC: 99 MMOL/L — SIGNIFICANT CHANGE UP (ref 96–108)
CO2 SERPL-SCNC: 30 MMOL/L — SIGNIFICANT CHANGE UP (ref 22–31)
CREAT SERPL-MCNC: 0.9 MG/DL — SIGNIFICANT CHANGE UP (ref 0.5–1.3)
GLUCOSE BLDC GLUCOMTR-MCNC: 124 MG/DL — HIGH (ref 70–99)
GLUCOSE SERPL-MCNC: 144 MG/DL — HIGH (ref 70–99)
HCT VFR BLD CALC: 45.6 % — SIGNIFICANT CHANGE UP (ref 39–50)
HGB BLD-MCNC: 15.1 G/DL — SIGNIFICANT CHANGE UP (ref 13–17)
INR BLD: 1.21 — HIGH (ref 0.88–1.16)
MAGNESIUM SERPL-MCNC: 2.3 MG/DL — SIGNIFICANT CHANGE UP (ref 1.6–2.6)
MCHC RBC-ENTMCNC: 29.4 PG — SIGNIFICANT CHANGE UP (ref 27–34)
MCHC RBC-ENTMCNC: 33.1 GM/DL — SIGNIFICANT CHANGE UP (ref 32–36)
MCV RBC AUTO: 88.9 FL — SIGNIFICANT CHANGE UP (ref 80–100)
NRBC # BLD: 0 /100 WBCS — SIGNIFICANT CHANGE UP (ref 0–0)
PLATELET # BLD AUTO: 198 K/UL — SIGNIFICANT CHANGE UP (ref 150–400)
POTASSIUM SERPL-MCNC: 4.4 MMOL/L — SIGNIFICANT CHANGE UP (ref 3.5–5.3)
POTASSIUM SERPL-SCNC: 4.4 MMOL/L — SIGNIFICANT CHANGE UP (ref 3.5–5.3)
PROTHROM AB SERPL-ACNC: 13.7 SEC — HIGH (ref 10–12.9)
RBC # BLD: 5.13 M/UL — SIGNIFICANT CHANGE UP (ref 4.2–5.8)
RBC # FLD: 11.7 % — SIGNIFICANT CHANGE UP (ref 10.3–14.5)
RH IG SCN BLD-IMP: NEGATIVE — SIGNIFICANT CHANGE UP
SODIUM SERPL-SCNC: 139 MMOL/L — SIGNIFICANT CHANGE UP (ref 135–145)
WBC # BLD: 13.18 K/UL — HIGH (ref 3.8–10.5)
WBC # FLD AUTO: 13.18 K/UL — HIGH (ref 3.8–10.5)

## 2019-05-08 PROCEDURE — 71045 X-RAY EXAM CHEST 1 VIEW: CPT | Mod: 26

## 2019-05-08 RX ORDER — ASPIRIN/CALCIUM CARB/MAGNESIUM 324 MG
81 TABLET ORAL DAILY
Qty: 0 | Refills: 0 | Status: DISCONTINUED | OUTPATIENT
Start: 2019-05-08 | End: 2019-05-08

## 2019-05-08 RX ORDER — METOPROLOL TARTRATE 50 MG
50 TABLET ORAL DAILY
Qty: 0 | Refills: 0 | Status: DISCONTINUED | OUTPATIENT
Start: 2019-05-08 | End: 2019-05-09

## 2019-05-08 RX ORDER — CHLORPROMAZINE HCL 10 MG
25 TABLET ORAL EVERY 8 HOURS
Qty: 0 | Refills: 0 | Status: DISCONTINUED | OUTPATIENT
Start: 2019-05-08 | End: 2019-05-09

## 2019-05-08 RX ORDER — HEPARIN SODIUM 5000 [USP'U]/ML
2200 INJECTION INTRAVENOUS; SUBCUTANEOUS
Qty: 25000 | Refills: 0 | Status: DISCONTINUED | OUTPATIENT
Start: 2019-05-08 | End: 2019-05-09

## 2019-05-08 RX ORDER — ASPIRIN/CALCIUM CARB/MAGNESIUM 324 MG
300 TABLET ORAL DAILY
Qty: 0 | Refills: 0 | Status: DISCONTINUED | OUTPATIENT
Start: 2019-05-08 | End: 2019-05-08

## 2019-05-08 RX ORDER — BACLOFEN 100 %
10 POWDER (GRAM) MISCELLANEOUS ONCE
Qty: 0 | Refills: 0 | Status: COMPLETED | OUTPATIENT
Start: 2019-05-08 | End: 2019-05-08

## 2019-05-08 RX ORDER — ASPIRIN/CALCIUM CARB/MAGNESIUM 324 MG
81 TABLET ORAL DAILY
Qty: 0 | Refills: 0 | Status: DISCONTINUED | OUTPATIENT
Start: 2019-05-09 | End: 2019-05-09

## 2019-05-08 RX ORDER — LISINOPRIL 2.5 MG/1
2.5 TABLET ORAL DAILY
Qty: 0 | Refills: 0 | Status: DISCONTINUED | OUTPATIENT
Start: 2019-05-08 | End: 2019-05-10

## 2019-05-08 RX ORDER — ATORVASTATIN CALCIUM 80 MG/1
80 TABLET, FILM COATED ORAL AT BEDTIME
Qty: 0 | Refills: 0 | Status: DISCONTINUED | OUTPATIENT
Start: 2019-05-08 | End: 2019-05-13

## 2019-05-08 RX ADMIN — ATORVASTATIN CALCIUM 80 MILLIGRAM(S): 80 TABLET, FILM COATED ORAL at 22:24

## 2019-05-08 RX ADMIN — Medication 25 MILLIGRAM(S): at 22:24

## 2019-05-08 RX ADMIN — HEPARIN SODIUM 22 UNIT(S)/HR: 5000 INJECTION INTRAVENOUS; SUBCUTANEOUS at 22:38

## 2019-05-08 RX ADMIN — Medication 10 MILLIGRAM(S): at 23:31

## 2019-05-08 NOTE — H&P ADULT - PROBLEM SELECTOR PLAN 3
Pt noted to have dysphagia, failed dysphagia screen, arrived with NGT from [facility]. Etiology likely 2/2 stroke  - Formal speech and swallow eval  - NGT placement confirmed by radiology here

## 2019-05-08 NOTE — H&P ADULT - PROBLEM SELECTOR PLAN 4
Pt with hx of thoracic aortic aneurysm, s/p repair in 2016 by Dr Stevenson  - Contact Dr Stevenson to make him aware pt is here Pt with hx of thoracic aortic aneurysm s/p repair in 2016 by Dr Stevenson  - Per chart review: status post valve sparing aortic root replacement, ascending and hemiarch replacement on 12/19/16. Echo 3/2019: concentric LVH, EF 60%, akinesis of basal inferior wall, no significant valvulopathies   - Contact Dr Stevenson to make him aware pt is here

## 2019-05-08 NOTE — H&P ADULT - NSICDXPASTMEDICALHX_GEN_ALL_CORE_FT
PAST MEDICAL HISTORY:  H/O non-ST elevation myocardial infarction (NSTEMI)     HTN (hypertension)     Hyperlipidemia     Obstructive sleep apnea     Pre-diabetes     S/P coronary artery stent placement     Thoracic aortic aneurysm without rupture

## 2019-05-08 NOTE — PATIENT PROFILE ADULT - VISION (WITH CORRECTIVE LENSES IF THE PATIENT USUALLY WEARS THEM):
double vision, when pt was admitted to hospital/Normal vision: sees adequately in most situations; can see medication labels, newsprint

## 2019-05-08 NOTE — H&P ADULT - NSHPREVIEWOFSYSTEMS_GEN_ALL_CORE
REVIEW OF SYSTEM:    Constitutional: No fever, chills, fatigue  Neuro: No headache, numbness, dizziness,+weakness,+difficulty swallowing  Resp: No cough, wheezing, shortness of breath,+ hiccups  CVS: No chest pain, palpitations, leg swelling  GI: No abdominal pain, nausea, vomiting, diarrhea   : No dysuria, frequency, incontinence  Skin: No itching, burning, rashes, or lesions   Msk: No joint pain or swelling  Psych: No depression, anxiety, mood swings REVIEW OF SYSTEM:    Constitutional: No fever, chills, fatigue  Neuro: No headache, numbness, dizziness, +weakness, +difficulty swallowing  Resp: No cough, wheezing, shortness of breath,+ hiccups  CVS: No chest pain, palpitations, leg swelling  GI: No abdominal pain, nausea, vomiting, diarrhea   : No dysuria, frequency, incontinence  Skin: No itching, burning, rashes, or lesions   Msk: No joint pain or swelling  Psych: No depression, anxiety, mood swings

## 2019-05-08 NOTE — H&P ADULT - NSHPPHYSICALEXAM_GEN_ALL_CORE
.  VITAL SIGNS:  T(C): 37.6 (05-08-19 @ 22:16), Max: 37.6 (05-08-19 @ 22:16)  T(F): 99.6 (05-08-19 @ 22:16), Max: 99.6 (05-08-19 @ 22:16)  HR: 70 (05-08-19 @ 20:44) (70 - 72)  BP: 159/77 (05-08-19 @ 20:44) (159/77 - 161/112)  BP(mean): 110 (05-08-19 @ 20:44) (110 - 130)  RR: 17 (05-08-19 @ 20:44) (17 - 17)  SpO2: 98% (05-08-19 @ 20:44) (98% - 99%)  Wt(kg): --    PHYSICAL EXAM:    Constitutional: WDWN resting comfortably in bed; NAD  Head: NC/AT  Eyes: PERRL, EOMI, clear conjunctiva  ENT: no nasal discharge; uvula midline, no oropharyngeal erythema or exudates; MMM  Neck: supple; no JVD or thyromegaly  Respiratory: CTA B/L; no W/R/R, no retractions  Cardiac: +S1/S2; RRR; no M/R/G; PMI non-displaced  Gastrointestinal: soft, NT/ND; no rebound or guarding; +BSx4  Genitourinary: normal external genitalia  Back: spine midline, no bony tenderness or step-offs; no CVAT B/L  Extremities: WWP, no clubbing or cyanosis; no peripheral edema  Musculoskeletal: NROM x4; no joint swelling, tenderness or erythema  Vascular: 2+ radial, femoral, DP/PT pulses B/L  Dermatologic: skin warm, dry and intact; no rashes, wounds, or scars  Lymphatic: no submandibular or cervical LAD  Neurologic: AAOx3; CNII-XII grossly intact; no focal deficits  Psychiatric: affect and characteristics of appearance, verbalizations, behaviors are appropriate .  VITAL SIGNS:  T(C): 37.6 (05-08-19 @ 22:16), Max: 37.6 (05-08-19 @ 22:16)  T(F): 99.6 (05-08-19 @ 22:16), Max: 99.6 (05-08-19 @ 22:16)  HR: 70 (05-08-19 @ 20:44) (70 - 72)  BP: 159/77 (05-08-19 @ 20:44) (159/77 - 161/112)  BP(mean): 110 (05-08-19 @ 20:44) (110 - 130)  RR: 17 (05-08-19 @ 20:44) (17 - 17)  SpO2: 98% (05-08-19 @ 20:44) (98% - 99%)  Wt(kg): --    PHYSICAL EXAM:    Constitutional: WDWN resting comfortably in bed; NAD  Head: NC/AT  Eyes: PERRL, EOMI, clear conjunctiva  ENT: no nasal discharge; uvula midline, no oropharyngeal erythema or exudates; MMM  Neck: supple; no JVD or thyromegaly  Respiratory: CTA B/L; no W/R/R, no retractions; blood tinged sputum noted in suction tubing (pt states he has been doing deep suctioning due to secretions and believes he may have caused trauma to the throat  Cardiac: +S1/S2; RRR; no M/R/G; PMI non-displaced  Gastrointestinal: soft, NT/ND; no rebound or guarding; +BSx4  Back: spine midline, no bony tenderness or step-offs; no CVAT B/L  Extremities: WWP, no clubbing or cyanosis; no peripheral edema  Musculoskeletal: NROM x4; no joint swelling, tenderness or erythema  Vascular: 2+ radial, femoral, DP/PT pulses B/L  Dermatologic: skin warm, dry and intact; no rashes, wounds, or scars  Lymphatic: no submandibular or cervical LAD  Neurologic:  Mental status: Awake, alert and oriented x3.  Recent and remote memory intact.  Naming, repetition and comprehension intact.  Attention/concentration intact.  No dysarthria, no aphasia.  Fund of knowledge appropriate.    Cranial nerves: Pupils equally round and reactive to light, visual fields full, no nystagmus, extraocular muscles intact, V1 through V3 intact bilaterally and symmetric, face symmetric, hearing intact to finger rub, palate elevation symmetric, tongue was midline, +sternocleidomastoid/shoulder shrug strength 4/5 on R 5/5 on L. Motor:  Normal bulk and tone, strength 5/5 in bilateral upper and lower extremities.   strength 5/5.  +Rapid alternating movements impaired on R, intact on L.   Sensation: Intact to light touch, proprioception, and pinprick b/l.  No neglect.   Coordination: + R dysmetria on finger-to-nose (coordination on L).  +falls to R when asked to sit forward  Gait: deferred (however patient does note ataxia)  Psychiatric: affect and characteristics of appearance, verbalizations, behaviors are appropriate

## 2019-05-08 NOTE — H&P ADULT - PROBLEM SELECTOR PLAN 10
F: No IVF  E: Replete prn  N: NPO, plan to start TF pending nutrition eval    PPX: on hep gtt full AC, SCDs  Code: Full  Dispo:7L    #Transition of care  1) PCP Contacted on Admission: (Y) --> Name & Phone #: Dr Issa  2) Date of Contact with PCP: 5/8/19  3) PCP Contacted at Discharge: (Y/N, N/A)  4) Summary of Handoff Given to PCP:   5) Post-Discharge Appointment Date and Location:

## 2019-05-08 NOTE — H&P ADULT - HISTORY OF PRESENT ILLNESS
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 Following history obtained from Dr. Montez, Jan [phone] who cared for pt at [place]: TPA pushed at 4:11 am on 5/1/19. Initial CTH negative. CTP showing small focal area of decreased perfusion in L frontal area. CTA H/N showed R distal vertebral artery occlusion of unclear chronicity. CTH 24 hours later reportedly negative for bleed. Pt started on hep gtt, asa 81, lipitor, losartan, lopressor.  MRI showed subacute/acute L frontal and R cerebellar strokes. MRA negative. TTE showed LVH and LVEF 60%. Pt failed dysphagia screen, NGT placed for feeds and medications. Pt ambulated with PT. Pt noted to have hiccups for which he was on baclofen (per patient without relief). 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 Following history obtained from Dr. Montez, Jan [phone] who cared for pt at [place]: TPA pushed at 4:11 am on 5/1/19. Initial CTH negative. CTP showing small focal area of decreased perfusion in L frontal area. CTA H/N showed R distal vertebral artery occlusion of unclear chronicity. CTH 24 hours later reportedly negative for bleed. Pt started on hep gtt, asa 81, lipitor, losartan, lopressor.  MRI showed subacute/acute L frontal and R cerebellar strokes. MRA negative. TTE showed LVH and LVEF 60%. Pt failed dysphagia screen, NGT placed for feeds and medications. Pt ambulated with PT. Pt noted to have hiccups for which he was on baclofen (per patient without relief). Of note pt has been suctioning his own secretions and tubing noted to have blood tinged secretions.

## 2019-05-08 NOTE — H&P ADULT - NSICDXPASTSURGICALHX_GEN_ALL_CORE_FT
PAST SURGICAL HISTORY:  Gynecomastia, male     H/O arthroscopy of left knee     History of aortic aneurysm repair

## 2019-05-08 NOTE — H&P ADULT - PROBLEM SELECTOR PLAN 1
Pt presented to [facility]) 5/1/19 with acute onset dizziness, gait ataxia (falling to R), s/p tPA on 5/1, found to have subacute/acute L frontal and R cerebellar strokes and R distal vertebral artery occlusion of unclear chronicity, transferred to [facility] for further management.  - Initial CTH negative, CTH 24 hours later reportedly negative for bleed.   - MRI showed subacute/acute L frontal and R cerebellar strokes.   - MRA negative  - TTE showed LVH and LVEF 60%  - Pt failed dysphagia screen, NGT placed for feeds and medications.  - Repeat CTH here  - S&S eval  - atorvastatin 80mg qHS  - hep gtt, monitor ptt until therapeutic x3 (goal 60-90)  - PT/OT  - Check hgb A1c, TSH, lipid panel  - Thorazine 25mg q8h for hiccups

## 2019-05-08 NOTE — H&P ADULT - ASSESSMENT
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

## 2019-05-08 NOTE — H&P ADULT - NSICDXFAMILYHX_GEN_ALL_CORE_FT
FAMILY HISTORY:  Father  Still living? Unknown  Family history of heart attack, Age at diagnosis: Age Unknown    Mother  Still living? Unknown  Family history of brain cancer, Age at diagnosis: Age Unknown

## 2019-05-08 NOTE — H&P ADULT - PROBLEM SELECTOR PLAN 5
Pt with hx of CAD with NSTEMI, s/p stent to RCA in Oct 2017 s/p DAPT x 1 year  - C/w Asa 81 mg daily  - C/w lisinopril 2.5 daily, uptitrate as tolerated  - C/w atorvastatin 80mg qHS Pt with hx of CAD with NSTEMI, s/p stent to RCA in Oct 2017 s/p DAPT x 1 year.  - Per chart review, cardiac cath during admission for NSTEMI: LM normal, 30% pLAD stenosis, distal LCx luminal irregularities, mid 100% thrombotic RCA lesion with L to R collaterals. LVEF 55%, basal inferior hypokinesis, LVEDP 14 mmHG, 24 mm gradient across the aortic valve: mild to moderate aortic stenosis  - C/w Asa 81 mg daily  - C/w lisinopril 2.5 daily, uptitrate as tolerated  - C/w atorvastatin 80mg qHS

## 2019-05-09 LAB
ANION GAP SERPL CALC-SCNC: 7 MMOL/L — SIGNIFICANT CHANGE UP (ref 5–17)
APPEARANCE UR: CLEAR — SIGNIFICANT CHANGE UP
APTT BLD: 49.6 SEC — HIGH (ref 27.5–36.3)
APTT BLD: 71.2 SEC — HIGH (ref 27.5–36.3)
APTT BLD: 96.3 SEC — HIGH (ref 27.5–36.3)
BASE EXCESS BLDA CALC-SCNC: 6 MMOL/L — HIGH (ref -2–3)
BASOPHILS # BLD AUTO: 0.02 K/UL — SIGNIFICANT CHANGE UP (ref 0–0.2)
BASOPHILS NFR BLD AUTO: 0.2 % — SIGNIFICANT CHANGE UP (ref 0–2)
BILIRUB UR-MCNC: NEGATIVE — SIGNIFICANT CHANGE UP
BUN SERPL-MCNC: 20 MG/DL — SIGNIFICANT CHANGE UP (ref 7–23)
CALCIUM SERPL-MCNC: 9.5 MG/DL — SIGNIFICANT CHANGE UP (ref 8.4–10.5)
CHLORIDE SERPL-SCNC: 100 MMOL/L — SIGNIFICANT CHANGE UP (ref 96–108)
CHOLEST SERPL-MCNC: 117 MG/DL — SIGNIFICANT CHANGE UP (ref 10–199)
CO2 SERPL-SCNC: 30 MMOL/L — SIGNIFICANT CHANGE UP (ref 22–31)
COLOR SPEC: YELLOW — SIGNIFICANT CHANGE UP
CREAT SERPL-MCNC: 0.92 MG/DL — SIGNIFICANT CHANGE UP (ref 0.5–1.3)
DIFF PNL FLD: NEGATIVE — SIGNIFICANT CHANGE UP
EOSINOPHIL # BLD AUTO: 0.31 K/UL — SIGNIFICANT CHANGE UP (ref 0–0.5)
EOSINOPHIL NFR BLD AUTO: 2.7 % — SIGNIFICANT CHANGE UP (ref 0–6)
GLUCOSE BLDC GLUCOMTR-MCNC: 123 MG/DL — HIGH (ref 70–99)
GLUCOSE SERPL-MCNC: 136 MG/DL — HIGH (ref 70–99)
GLUCOSE UR QL: NEGATIVE — SIGNIFICANT CHANGE UP
HBA1C BLD-MCNC: 6 % — HIGH (ref 4–5.6)
HCO3 BLDA-SCNC: 30 MMOL/L — HIGH (ref 21–28)
HCT VFR BLD CALC: 43.8 % — SIGNIFICANT CHANGE UP (ref 39–50)
HCT VFR BLD CALC: 43.8 % — SIGNIFICANT CHANGE UP (ref 39–50)
HDLC SERPL-MCNC: 42 MG/DL — SIGNIFICANT CHANGE UP
HGB BLD-MCNC: 14.6 G/DL — SIGNIFICANT CHANGE UP (ref 13–17)
HGB BLD-MCNC: 14.7 G/DL — SIGNIFICANT CHANGE UP (ref 13–17)
IMM GRANULOCYTES NFR BLD AUTO: 0.4 % — SIGNIFICANT CHANGE UP (ref 0–1.5)
INR BLD: 1.28 — HIGH (ref 0.88–1.16)
KETONES UR-MCNC: 15 MG/DL
LEUKOCYTE ESTERASE UR-ACNC: NEGATIVE — SIGNIFICANT CHANGE UP
LIPID PNL WITH DIRECT LDL SERPL: 57 MG/DL — SIGNIFICANT CHANGE UP
LYMPHOCYTES # BLD AUTO: 18.6 % — SIGNIFICANT CHANGE UP (ref 13–44)
LYMPHOCYTES # BLD AUTO: 2.1 K/UL — SIGNIFICANT CHANGE UP (ref 1–3.3)
MAGNESIUM SERPL-MCNC: 2.4 MG/DL — SIGNIFICANT CHANGE UP (ref 1.6–2.6)
MCHC RBC-ENTMCNC: 29.9 PG — SIGNIFICANT CHANGE UP (ref 27–34)
MCHC RBC-ENTMCNC: 30.5 PG — SIGNIFICANT CHANGE UP (ref 27–34)
MCHC RBC-ENTMCNC: 33.3 GM/DL — SIGNIFICANT CHANGE UP (ref 32–36)
MCHC RBC-ENTMCNC: 33.6 GM/DL — SIGNIFICANT CHANGE UP (ref 32–36)
MCV RBC AUTO: 89.6 FL — SIGNIFICANT CHANGE UP (ref 80–100)
MCV RBC AUTO: 90.9 FL — SIGNIFICANT CHANGE UP (ref 80–100)
MONOCYTES # BLD AUTO: 1.18 K/UL — HIGH (ref 0–0.9)
MONOCYTES NFR BLD AUTO: 10.5 % — SIGNIFICANT CHANGE UP (ref 2–14)
NEUTROPHILS # BLD AUTO: 7.64 K/UL — HIGH (ref 1.8–7.4)
NEUTROPHILS NFR BLD AUTO: 67.6 % — SIGNIFICANT CHANGE UP (ref 43–77)
NITRITE UR-MCNC: NEGATIVE — SIGNIFICANT CHANGE UP
NRBC # BLD: 0 /100 WBCS — SIGNIFICANT CHANGE UP (ref 0–0)
NRBC # BLD: 0 /100 WBCS — SIGNIFICANT CHANGE UP (ref 0–0)
PCO2 BLDA: 42 MMHG — SIGNIFICANT CHANGE UP (ref 35–48)
PH BLDA: 7.48 — HIGH (ref 7.35–7.45)
PH UR: 6.5 — SIGNIFICANT CHANGE UP (ref 5–8)
PLATELET # BLD AUTO: 201 K/UL — SIGNIFICANT CHANGE UP (ref 150–400)
PLATELET # BLD AUTO: 201 K/UL — SIGNIFICANT CHANGE UP (ref 150–400)
PO2 BLDA: 101 MMHG — SIGNIFICANT CHANGE UP (ref 83–108)
POTASSIUM SERPL-MCNC: 4.2 MMOL/L — SIGNIFICANT CHANGE UP (ref 3.5–5.3)
POTASSIUM SERPL-SCNC: 4.2 MMOL/L — SIGNIFICANT CHANGE UP (ref 3.5–5.3)
PROT UR-MCNC: 30 MG/DL
PROTHROM AB SERPL-ACNC: 14.6 SEC — HIGH (ref 10–12.9)
RBC # BLD: 4.82 M/UL — SIGNIFICANT CHANGE UP (ref 4.2–5.8)
RBC # BLD: 4.89 M/UL — SIGNIFICANT CHANGE UP (ref 4.2–5.8)
RBC # FLD: 11.6 % — SIGNIFICANT CHANGE UP (ref 10.3–14.5)
RBC # FLD: 11.7 % — SIGNIFICANT CHANGE UP (ref 10.3–14.5)
SAO2 % BLDA: 98 % — SIGNIFICANT CHANGE UP (ref 95–100)
SODIUM SERPL-SCNC: 137 MMOL/L — SIGNIFICANT CHANGE UP (ref 135–145)
SP GR SPEC: 1.02 — SIGNIFICANT CHANGE UP (ref 1–1.03)
TOTAL CHOLESTEROL/HDL RATIO MEASUREMENT: 2.8 RATIO — LOW (ref 3.4–9.6)
TRIGL SERPL-MCNC: 89 MG/DL — SIGNIFICANT CHANGE UP (ref 10–149)
TSH SERPL-MCNC: 2.08 UIU/ML — SIGNIFICANT CHANGE UP (ref 0.35–4.94)
UROBILINOGEN FLD QL: 1 E.U./DL — SIGNIFICANT CHANGE UP
WBC # BLD: 11.29 K/UL — HIGH (ref 3.8–10.5)
WBC # BLD: 11.43 K/UL — HIGH (ref 3.8–10.5)
WBC # FLD AUTO: 11.29 K/UL — HIGH (ref 3.8–10.5)
WBC # FLD AUTO: 11.43 K/UL — HIGH (ref 3.8–10.5)

## 2019-05-09 PROCEDURE — 93010 ELECTROCARDIOGRAM REPORT: CPT

## 2019-05-09 PROCEDURE — 99222 1ST HOSP IP/OBS MODERATE 55: CPT

## 2019-05-09 PROCEDURE — 70450 CT HEAD/BRAIN W/O DYE: CPT | Mod: 26

## 2019-05-09 PROCEDURE — 71045 X-RAY EXAM CHEST 1 VIEW: CPT | Mod: 26

## 2019-05-09 RX ORDER — SODIUM CHLORIDE 0.65 %
1 AEROSOL, SPRAY (ML) NASAL
Refills: 0 | Status: DISCONTINUED | OUTPATIENT
Start: 2019-05-09 | End: 2019-05-13

## 2019-05-09 RX ORDER — METOPROLOL TARTRATE 50 MG
25 TABLET ORAL
Refills: 0 | Status: DISCONTINUED | OUTPATIENT
Start: 2019-05-10 | End: 2019-05-13

## 2019-05-09 RX ORDER — OXYMETAZOLINE HYDROCHLORIDE 0.5 MG/ML
1 SPRAY NASAL
Refills: 0 | Status: COMPLETED | OUTPATIENT
Start: 2019-05-09 | End: 2019-05-12

## 2019-05-09 RX ORDER — ASPIRIN/CALCIUM CARB/MAGNESIUM 324 MG
81 TABLET ORAL DAILY
Refills: 0 | Status: DISCONTINUED | OUTPATIENT
Start: 2019-05-10 | End: 2019-05-10

## 2019-05-09 RX ORDER — BACLOFEN 100 %
20 POWDER (GRAM) MISCELLANEOUS AT BEDTIME
Refills: 0 | Status: DISCONTINUED | OUTPATIENT
Start: 2019-05-09 | End: 2019-05-09

## 2019-05-09 RX ORDER — HEPARIN SODIUM 5000 [USP'U]/ML
5000 INJECTION INTRAVENOUS; SUBCUTANEOUS EVERY 8 HOURS
Refills: 0 | Status: DISCONTINUED | OUTPATIENT
Start: 2019-05-09 | End: 2019-05-09

## 2019-05-09 RX ORDER — ACETAMINOPHEN 500 MG
1000 TABLET ORAL ONCE
Refills: 0 | Status: COMPLETED | OUTPATIENT
Start: 2019-05-09 | End: 2019-05-09

## 2019-05-09 RX ORDER — BACLOFEN 100 %
20 POWDER (GRAM) MISCELLANEOUS ONCE
Refills: 0 | Status: COMPLETED | OUTPATIENT
Start: 2019-05-09 | End: 2019-05-09

## 2019-05-09 RX ORDER — PIPERACILLIN AND TAZOBACTAM 4; .5 G/20ML; G/20ML
INJECTION, POWDER, LYOPHILIZED, FOR SOLUTION INTRAVENOUS
Refills: 0 | Status: DISCONTINUED | OUTPATIENT
Start: 2019-05-09 | End: 2019-05-12

## 2019-05-09 RX ORDER — METOPROLOL TARTRATE 50 MG
1 TABLET ORAL
Qty: 0 | Refills: 0 | DISCHARGE

## 2019-05-09 RX ORDER — PIPERACILLIN AND TAZOBACTAM 4; .5 G/20ML; G/20ML
4.5 INJECTION, POWDER, LYOPHILIZED, FOR SOLUTION INTRAVENOUS EVERY 6 HOURS
Refills: 0 | Status: DISCONTINUED | OUTPATIENT
Start: 2019-05-09 | End: 2019-05-12

## 2019-05-09 RX ORDER — BACLOFEN 100 %
20 POWDER (GRAM) MISCELLANEOUS
Refills: 0 | Status: DISCONTINUED | OUTPATIENT
Start: 2019-05-09 | End: 2019-05-10

## 2019-05-09 RX ORDER — PIPERACILLIN AND TAZOBACTAM 4; .5 G/20ML; G/20ML
4.5 INJECTION, POWDER, LYOPHILIZED, FOR SOLUTION INTRAVENOUS ONCE
Refills: 0 | Status: COMPLETED | OUTPATIENT
Start: 2019-05-09 | End: 2019-05-09

## 2019-05-09 RX ORDER — VANCOMYCIN HCL 1 G
2000 VIAL (EA) INTRAVENOUS EVERY 12 HOURS
Refills: 0 | Status: DISCONTINUED | OUTPATIENT
Start: 2019-05-09 | End: 2019-05-10

## 2019-05-09 RX ORDER — HEPARIN SODIUM 5000 [USP'U]/ML
2000 INJECTION INTRAVENOUS; SUBCUTANEOUS
Qty: 25000 | Refills: 0 | Status: DISCONTINUED | OUTPATIENT
Start: 2019-05-09 | End: 2019-05-09

## 2019-05-09 RX ORDER — BACLOFEN 100 %
10 POWDER (GRAM) MISCELLANEOUS THREE TIMES A DAY
Refills: 0 | Status: DISCONTINUED | OUTPATIENT
Start: 2019-05-09 | End: 2019-05-09

## 2019-05-09 RX ORDER — GABAPENTIN 400 MG/1
100 CAPSULE ORAL THREE TIMES A DAY
Refills: 0 | Status: DISCONTINUED | OUTPATIENT
Start: 2019-05-09 | End: 2019-05-09

## 2019-05-09 RX ORDER — HEPARIN SODIUM 5000 [USP'U]/ML
7500 INJECTION INTRAVENOUS; SUBCUTANEOUS EVERY 8 HOURS
Refills: 0 | Status: DISCONTINUED | OUTPATIENT
Start: 2019-05-09 | End: 2019-05-10

## 2019-05-09 RX ORDER — GABAPENTIN 400 MG/1
300 CAPSULE ORAL AT BEDTIME
Refills: 0 | Status: DISCONTINUED | OUTPATIENT
Start: 2019-05-09 | End: 2019-05-09

## 2019-05-09 RX ORDER — BACLOFEN 100 %
10 POWDER (GRAM) MISCELLANEOUS EVERY 8 HOURS
Refills: 0 | Status: DISCONTINUED | OUTPATIENT
Start: 2019-05-09 | End: 2019-05-09

## 2019-05-09 RX ORDER — GABAPENTIN 400 MG/1
300 CAPSULE ORAL
Refills: 0 | Status: DISCONTINUED | OUTPATIENT
Start: 2019-05-09 | End: 2019-05-13

## 2019-05-09 RX ADMIN — Medication 20 MILLIGRAM(S): at 13:26

## 2019-05-09 RX ADMIN — Medication 400 MILLIGRAM(S): at 02:18

## 2019-05-09 RX ADMIN — Medication 1000 MILLIGRAM(S): at 02:45

## 2019-05-09 RX ADMIN — ATORVASTATIN CALCIUM 80 MILLIGRAM(S): 80 TABLET, FILM COATED ORAL at 22:07

## 2019-05-09 RX ADMIN — Medication 20 MILLIGRAM(S): at 19:39

## 2019-05-09 RX ADMIN — Medication 81 MILLIGRAM(S): at 11:40

## 2019-05-09 RX ADMIN — Medication 250 MILLIGRAM(S): at 17:39

## 2019-05-09 RX ADMIN — Medication 50 MILLIGRAM(S): at 11:40

## 2019-05-09 RX ADMIN — HEPARIN SODIUM 7500 UNIT(S): 5000 INJECTION INTRAVENOUS; SUBCUTANEOUS at 15:06

## 2019-05-09 RX ADMIN — GABAPENTIN 300 MILLIGRAM(S): 400 CAPSULE ORAL at 19:40

## 2019-05-09 RX ADMIN — PIPERACILLIN AND TAZOBACTAM 200 GRAM(S): 4; .5 INJECTION, POWDER, LYOPHILIZED, FOR SOLUTION INTRAVENOUS at 17:16

## 2019-05-09 RX ADMIN — PIPERACILLIN AND TAZOBACTAM 200 GRAM(S): 4; .5 INJECTION, POWDER, LYOPHILIZED, FOR SOLUTION INTRAVENOUS at 03:34

## 2019-05-09 RX ADMIN — PIPERACILLIN AND TAZOBACTAM 200 GRAM(S): 4; .5 INJECTION, POWDER, LYOPHILIZED, FOR SOLUTION INTRAVENOUS at 10:01

## 2019-05-09 RX ADMIN — GABAPENTIN 100 MILLIGRAM(S): 400 CAPSULE ORAL at 16:19

## 2019-05-09 RX ADMIN — HEPARIN SODIUM 7500 UNIT(S): 5000 INJECTION INTRAVENOUS; SUBCUTANEOUS at 22:07

## 2019-05-09 RX ADMIN — HEPARIN SODIUM 20 UNIT(S)/HR: 5000 INJECTION INTRAVENOUS; SUBCUTANEOUS at 12:10

## 2019-05-09 RX ADMIN — Medication 250 MILLIGRAM(S): at 04:15

## 2019-05-09 RX ADMIN — OXYMETAZOLINE HYDROCHLORIDE 1 SPRAY(S): 0.5 SPRAY NASAL at 17:46

## 2019-05-09 RX ADMIN — Medication 1 SPRAY(S): at 10:58

## 2019-05-09 RX ADMIN — LISINOPRIL 2.5 MILLIGRAM(S): 2.5 TABLET ORAL at 11:40

## 2019-05-09 RX ADMIN — Medication 25 MILLIGRAM(S): at 10:58

## 2019-05-09 NOTE — PHYSICAL THERAPY INITIAL EVALUATION ADULT - GENERAL OBSERVATIONS, REHAB EVAL
Pt received supine in bed with +NGT, +face tent, +IV, +[place], +EKG, NAD. brother present, Pt left as found, NAD, line and tube intact, +call bell, +bed alarm, NAD, brother present, RN awares

## 2019-05-09 NOTE — OCCUPATIONAL THERAPY INITIAL EVALUATION ADULT - ADDITIONAL COMMENTS
Patient reports being independent in all ADLs and functional mobility PTA w/o use of AD/AE. Patient traveling for work trip when symptoms onset.

## 2019-05-09 NOTE — SWALLOW BEDSIDE ASSESSMENT ADULT - SLP GENERAL OBSERVATIONS
Pt received in bed, alert and oriented, presenting with persistent and uncontrollable hiccups (acute onset)

## 2019-05-09 NOTE — PROGRESS NOTE ADULT - PROBLEM SELECTOR PLAN 3
Pt noted to have dysphagia, failed dysphagia screen, arrived with NGT from [facility]. Etiology likely 2/2 stroke  - Formal speech and swallow eval  - NGT placement confirmed by radiology here

## 2019-05-09 NOTE — PHYSICAL THERAPY INITIAL EVALUATION ADULT - GAIT DEVIATIONS NOTED, PT EVAL
difficulty to advance b/l LEs, requires max verbal and tactile cues for wt shifting, +ataxia on RLE, occasionally buckling on R knee during side stepping./decreased step length/increased time in double stance/decreased weight-shifting ability

## 2019-05-09 NOTE — CONSULT NOTE ADULT - SUBJECTIVE AND OBJECTIVE BOX
ENT consult response     54M s/p CVA several days ago, was life flighted from [place] for care here. Had NGT placed earlier in the week while on a heparin gtt. Had some bleeding then. Then overnight pulled his NGT and had some blood tinged sputum. Had been suctioning with red rubber. ENT consulted for further mgmt and evaluation.     PMH: CAD, htn  PSH: no ENT surgeries    PE:  NAD, lying in bed with NC  blood clot on lip, crusted  No blood in OC  Opx with very minimal bleeding, no active streaking  Endoscopic evalutaion:  L side with e/o of abrasion. not actively bleeding immediately anterior to eustachian tube, npx clear, opx clear, hpx clear, no pooling of secretions  epiglottis insensate and arytenoids insensate on manipulation with endoscope  TVFs mobile symm b/l, sensate  airway is patent  no e/o bleeding other than described above    A/P: 54M on heparin gtt, with e/o NGT trauma in L junction of nasal cavity to npx. No indication for packing or OR cautery at this time (given lociation and minimal bleeding).  - can do afrin for 2-3 days to L naris for aid in hemostasis  - saline sprays qid to nose   - no nose blowing  - no contraindication to NGT placement  - no nasal canula  - humidified air     d/w attending

## 2019-05-09 NOTE — PROGRESS NOTE ADULT - SUBJECTIVE AND OBJECTIVE BOX
INTERVAL HPI/OVERNIGHT EVENTS:  Patient was seen and examined at bedside. Febrile o/n, started empiric Unasyn. One episode of confusion last night, pulled out NGT.  Pt suctioned: good cough, no gag reflex. Patient resting comfortably. Biggest concern is hiccups and progress on stroke. Patient denies: fever, chills, dizziness, weakness, HA, Changes in vision, CP, palpitations, SOB, cough, N/V/D/C, dysuria, changes in bowel movements, LE edema. ROS otherwise negative.    VITAL SIGNS:  T(F): 98.1 (19 @ 05:39)  HR: 62 (19 @ 06:30)  BP: 152/75 (19 @ 06:30)  RR: 18 (19 @ 06:30)  SpO2: 97% (19 @ 06:30)  Wt(kg): --    PHYSICAL EXAM:    Constitutional: WDWN, NAD  HEENT: PERRL, EOMI, sclera non-icteric, neck supple, trachea midline, no masses, no JVD, MMM, good dentition  Respiratory: CTA b/l, good air entry b/l, no wheezing, no rhonchi, no rales, without accessory muscle use and no intercostal retractions  Cardiovascular: RRR, normal S1S2, no M/R/G  Gastrointestinal: soft, NTND, no masses palpable, BS normal  Extremities: Warm, well perfused, pulses equal bilateral upper and lower extremities, no edema, no clubbing  Neurological: AAOx3, CN Grossly intact  Skin: Normal temperature, warm, dry  Neurologic:  - Mental Status:  AAOx3; speech is fluent with intact naming, repetition, and comprehension  - Cranial Nerves II-XII:    II:  PERRLA; visual fields are full to confrontation  III, IV, VI:  EOMI, nystagmus on rightward gaze   V:  facial sensation is intact in the V1-V3 distribution bilaterally.  VII:  face is symmetric with normal eye closure and smile  VIII:  hearing is intact to finger rub  IX, X:  uvula is midline and soft palate rises symmetrically  XI:  head turning and shoulder shrug are intact bilaterally  XII:  tongue protrudes in the midline  - Motor:  strength is 5/5 throughout; normal muscle bulk and tone throughout; right pronator drift  - Reflexes:  2+ and symmetric at the biceps, triceps, brachioradialis, knees, and ankles;  plantar reflexes downgoing bilaterally  - Sensory:  intact to light touch, pin prick, vibration, and joint-position sense throughout  - Coordination:  finger-nose-finger and heel-knee-shin intact without dysmetria; rapid alternating hand movements intact  - Gait: deferred    MEDICATIONS  (STANDING):  aspirin enteric coated 81 milliGRAM(s) Oral daily  atorvastatin 80 milliGRAM(s) Oral at bedtime  chlorproMAZINE    Tablet 25 milliGRAM(s) Oral every 8 hours  heparin  Infusion 2200 Unit(s)/Hr (22 mL/Hr) IV Continuous <Continuous>  lisinopril 2.5 milliGRAM(s) Oral daily  metoprolol succinate ER 50 milliGRAM(s) Oral daily  oxymetazoline 0.05% Nasal Spray 1 Spray(s) Both Nostrils two times a day  piperacillin/tazobactam IVPB.      piperacillin/tazobactam IVPB. 4.5 Gram(s) IV Intermittent every 6 hours  vancomycin  IVPB 2000 milliGRAM(s) IV Intermittent every 12 hours    MEDICATIONS  (PRN):  sodium chloride 0.65% Nasal 1 Spray(s) Both Nostrils four times a day PRN Nasal Congestion      Allergies    erythromycin (Unknown)  tetanus toxoids (Fever)    Intolerances        LABS:                        14.7   11.43 )-----------( 201      ( 09 May 2019 07:31 )             43.8         137  |  100  |  20  ----------------------------<  136<H>  4.2   |  30  |  0.92    Ca    9.5      09 May 2019 07:31  Mg     2.4           PT/INR - ( 08 May 2019 20:31 )   PT: 13.7 sec;   INR: 1.21          PTT - ( 09 May 2019 02:38 )  PTT:71.2 sec  Urinalysis Basic - ( 09 May 2019 04:57 )    Color: Yellow / Appearance: Clear / S.020 / pH: x  Gluc: x / Ketone: 15 mg/dL  / Bili: Negative / Urobili: 1.0 E.U./dL   Blood: x / Protein: 30 mg/dL / Nitrite: NEGATIVE   Leuk Esterase: NEGATIVE / RBC: < 5 /HPF / WBC < 5 /HPF   Sq Epi: x / Non Sq Epi: 0-5 /HPF / Bacteria: Present /HPF        RADIOLOGY & ADDITIONAL TESTS:  Reviewed

## 2019-05-09 NOTE — CHART NOTE - NSCHARTNOTEFT_GEN_A_CORE
NGT placed through R nostril. Confirmed positioning: auscultated, confirmed with radiologist post-CXR; no pneumothorax. Positioning at 63.

## 2019-05-09 NOTE — PHYSICAL THERAPY INITIAL EVALUATION ADULT - COORDINATION ASSESSED, REHAB EVAL
finger to nose/dysmetria and ataxic with RUE, intact on LUE dysmetria and ataxic with RUE, intact on LUE/finger to nose

## 2019-05-09 NOTE — DIETITIAN INITIAL EVALUATION ADULT. - OTHER INFO
53 yo/male with PMHx NSTEMI, CAD, aortic root aneurysm repair, r/o stroke/TIA, HTN, OLIVA, presented to OSH while on work trip with dizziness, gait ataxia, and nausea. S/p tPA push. MRI showing subacute/acute L. frontal and R. cerebellar strokes. TTE +LVH and R. distal artery occlusion. Pt transferred to [facility] for further monitoring. Pt seen in room, awake, alert, pleasant, family members at bedside. Slightly difficult to communicate w/pt as he had developed hiccups earlier that morning. Pt reports that PTA at OSH he had good appetite, unclear if dietary restrictions (DASH) were being followed. Currently NPO; failed SLP exam this morning- recommendations for EN via NGT and ice chips. No N/V/C/D reported at this time. NKFA. Skin intact pressure-wise. No pain endorsed. Discussed purpose of TF and eventual advancement to PO diet pending SLP recommendations.

## 2019-05-09 NOTE — DIETITIAN INITIAL EVALUATION ADULT. - NS AS NUTRI INTERV ENTERAL NUTRITION
Schedule/Route/Recommend initiation of Jevity 1.2 Donny @ 75mL/hr x 24hrs via NGT. Provides: 1800mL TV, 2160kcal, 100g pro, 1453mL free H2O, 180% RDI, 1.24g/kg IBW protein. Recommend starting at 20mL/hr and advancing by [id number] to goal as tolerated. Additional free H2O per team. Monitor for s/s intolerance; maintain aspiration precautions at all times./Volume/Site care/Composition/Concentration/Rate/Feeding tube flush

## 2019-05-09 NOTE — DIETITIAN INITIAL EVALUATION ADULT. - PROBLEM SELECTOR PLAN 5
Pt with hx of CAD with NSTEMI, s/p stent to RCA in Oct 2017 s/p DAPT x 1 year.  - Per chart review, cardiac cath during admission for NSTEMI: LM normal, 30% pLAD stenosis, distal LCx luminal irregularities, mid 100% thrombotic RCA lesion with L to R collaterals. LVEF 55%, basal inferior hypokinesis, LVEDP 14 mmHG, 24 mm gradient across the aortic valve: mild to moderate aortic stenosis  - C/w Asa 81 mg daily  - C/w lisinopril 2.5 daily, uptitrate as tolerated  - C/w atorvastatin 80mg qHS

## 2019-05-09 NOTE — OCCUPATIONAL THERAPY INITIAL EVALUATION ADULT - SITTING BALANCE: DYNAMIC
fair minus/Patient able to complete tasks seated on EOB for approximately 15 minutes with CG assistance

## 2019-05-09 NOTE — OCCUPATIONAL THERAPY INITIAL EVALUATION ADULT - GENERAL OBSERVATIONS, REHAB EVAL
R hand dominant. Patient cleared for OT by MIKE Garcia. Patient received semi-supine, c/o painful hiccups 2+ hrs (RN Aware), +O2 via humidification w/ face tent, +tele, +kothari, +heplock, +son at bedside.

## 2019-05-09 NOTE — DIETITIAN INITIAL EVALUATION ADULT. - PROBLEM SELECTOR PLAN 4
Pt with hx of thoracic aortic aneurysm s/p repair in 2016 by Dr Stevenson  - Per chart review: status post valve sparing aortic root replacement, ascending and hemiarch replacement on 12/19/16. Echo 3/2019: concentric LVH, EF 60%, akinesis of basal inferior wall, no significant valvulopathies   - Contact Dr Stevenson to make him aware pt is here

## 2019-05-09 NOTE — DIETITIAN INITIAL EVALUATION ADULT. - PROBLEM SELECTOR PLAN 3
Pt noted to have dysphagia, failed dysphagia screen, arrived with NGT from [facility]. Etiology likely 2/2 stroke  - Formal speech and swallow eval  - NGT placement confirmed by radiology here

## 2019-05-09 NOTE — PHYSICAL THERAPY INITIAL EVALUATION ADULT - MODALITIES TREATMENT COMMENTS
CN Testing: B/L Frontalis intact; B/L buccinator intact; smile symmetrical; tongue protrusion at midline; B/L eyes open/close intact; Shoulder elevation: intact bilaterally; Vision H-Test: bilateral tracking and smooth pursuit intact; Convergence/Divergence: intact; Vision Quadrant Test: intact bilaterally

## 2019-05-09 NOTE — OCCUPATIONAL THERAPY INITIAL EVALUATION ADULT - PLANNED THERAPY INTERVENTIONS, OT EVAL
balance training/ROM/strengthening/neuromuscular re-education/fine motor coordination training/transfer training/ADL retraining/bed mobility training/motor coordination training

## 2019-05-09 NOTE — CONSULT NOTE ADULT - SUBJECTIVE AND OBJECTIVE BOX
REASON FOR CONSULT: last seen in office august 2018    HISTORY OF PRESENT ILLNESS:    54M PMH of CAD, NSTEMI s/p thrombectomy/FADI to mRCA (Oct 2017), aortic root aneurysm s/p repair 12/2016 (Dr Stevenson), was seen in ED for r/o stroke/TIA in Dec 2018 , HTN, OLIVA (CPAP nightly) presented to [facility] in [place], [place] (where he was for work) on 5/1/19 with acute onset dizziness, gait ataxia (falling to R), nausea. Following history obtained from Dr. Montez, Jan [phone] who cared for pt at [facility]: TPA pushed at 4:11 am on 5/1/19. Initial CTH negative. CTP showing small focal area of decreased perfusion in L frontal area. CTA H/N showed R distal vertebral artery occlusion of unclear chronicity. CTH 24 hours later reportedly negative for bleed. Pt started on hep gtt, asa 81, lipitor, losartan, lopressor.  MRI showed subacute/acute L frontal and R cerebellar strokes. MRA negative. TTE showed LVH and LVEF 60%. Pt failed dysphagia screen, NGT placed for feeds and medications. Pt ambulated with PT. Pt noted to have hiccups for which he was on baclofen (per patient without relief). Of note pt has been suctioning his own secretions and tubing noted to have blood tinged secretions.     PAST MEDICAL & SURGICAL HISTORY:  S/P coronary artery stent placement  H/O non-ST elevation myocardial infarction (NSTEMI)  Pre-diabetes  Thoracic aortic aneurysm without rupture  Hyperlipidemia  Obstructive sleep apnea  HTN (hypertension)  History of aortic aneurysm repair  Gynecomastia, male  H/O arthroscopy of left knee      [ ] Diabetes   [ ] Hypertension  [ ] Hyperlipidemia  [ ] CAD  [ ] PCI  [ ] CABG    PREVIOUS DIAGNOSTIC TESTING:    [ ] Echocardiogram:  [ ]  Catheterization:  [ ] Stress Test:  	    MEDICATIONS:  lisinopril 2.5 milliGRAM(s) Oral daily  metoprolol tartrate 25 milliGRAM(s) Oral two times a day    piperacillin/tazobactam IVPB. 4.5 Gram(s) IV Intermittent every 6 hours  piperacillin/tazobactam IVPB.      vancomycin  IVPB 2000 milliGRAM(s) IV Intermittent every 12 hours      gabapentin   Solution 100 milliGRAM(s) Oral three times a day      atorvastatin 80 milliGRAM(s) Oral at bedtime    aspirin  chewable 81 milliGRAM(s) Oral daily  heparin  Injectable 7500 Unit(s) SubCutaneous every 8 hours  oxymetazoline 0.05% Nasal Spray 1 Spray(s) Both Nostrils two times a day  sodium chloride 0.65% Nasal 1 Spray(s) Both Nostrils four times a day PRN      FAMILY HISTORY:  Family history of heart attack (Father)  Family history of brain cancer (Mother)      SOCIAL HISTORY:    [ x] Non-smoker  [ ] Smoker  [ ] Alcohol    FAMILY HX: NC    Allergies    erythromycin (Unknown)  tetanus toxoids (Fever)    Intolerances    	    REVIEW OF SYSTEMS:    [x] as per HPI  CONSTITUTIONAL: No fever, weight loss, or fatigue  ENT:  No difficulty hearing, tinnitus, vertigo; No sinus or throat pain  RESPIRATORY: No cough, wheezing, chills or hemoptysis; No Shortness of Breath  CARDIOVASCULAR: No chest pain, palpitations, dizziness, or leg swelling  GASTROINTESTINAL: No abdominal or epigastric pain. No nausea, vomiting, or hematemesis; No diarrhea or constipation. No melena or hematochezia.  GENITOURINARY: No dysuria, frequency, hematuria, or incontinence  NEUROLOGICAL: No headaches, memory loss, loss of strength, numbness, or tremors  MUSCULOSKELETAL: No joint pain or swelling; No muscle, back, or extremity pain  [x] All others negative	  [ ] Unable to obtain    PHYSICAL EXAM:  T(C): 36.6 (05-09-19 @ 13:36), Max: 38.4 (05-09-19 @ 01:37)  HR: 62 (05-09-19 @ 15:05) (62 - 80)  BP: 137/63 (05-09-19 @ 15:05) (137/63 - 161/112)  RR: 18 (05-09-19 @ 15:05) (16 - 18)  SpO2: 96% (05-09-19 @ 15:05) (92% - 99%)  Wt(kg): --  I&O's Summary    08 May 2019 07:01  -  09 May 2019 07:00  --------------------------------------------------------  IN: 66 mL / OUT: 450 mL / NET: -384 mL    09 May 2019 07:01  -  09 May 2019 16:02  --------------------------------------------------------  IN: 140 mL / OUT: 0 mL / NET: 140 mL        Appearance: Normal	  HEENT:   Normal oral mucosa, PERRL, EOMI	  Lymphatic: No lymphadenopathy  Cardiovascular: Normal S1 S2, No JVD, No murmurs, No edema  Respiratory: Lungs clear to auscultation	  Psychiatry: A & O x 3, Mood & affect appropriate  Gastrointestinal:  Soft, Non-tender, + BS	  Skin: No rashes, No ecchymoses, No cyanosis	  Neurologic: Non-focal  Extremities: Normal range of motion, No clubbing, cyanosis or edema  Vascular: Peripheral pulses palpable 2+ bilaterally    TELEMETRY: 	  nsr sinus kiera nio afib no pauses >1sec  ECG:  < from: 12 Lead ECG (10.13.17 @ 15:03) >  Diagnosis Line Normal sinus rhythm  Left axis deviation  Inferior infarct , age undetermined    < end of copied text >    ECHO: < from: Echocardiogram (03.20.19 @ 10:07) >  There is moderate concentric left ventricular hypertrophy.Abnormal   (paradoxical) septal motion consistent with post-operative statusNormal   left   ventricular systolic function with akinesis of basal inferior wall.  The   left   ventricular ejection fraction is normal.The left ventricular ejection   fraction   is 60%.The right ventricle is normal in size and function.The left   atrial size   is normal.Right atrial size is normal.No evidence for any hemodynamically   significant valvular disease.There was insufficient TR detected from   which to   calculate pulmonary artery systolic pressure.  There is no pericardial   effusion.    < end of copied text >    STRESS:  CATH:  	  RADIOLOGY:  CXR: < from: Xray Chest 1 View- PORTABLE-Urgent (05.09.19 @ 10:00) >    History: Nasogastric tube placement exam dysphasia    Impression:    Nasogastric tube with tip in region of stomach appearing since prior exam   earlier same day. No lung infiltrate or pleural effusion. Postoperative   changes again noted.            < end of copied text >    CT:  US:   	  	  LABS:	 	    CARDIAC MARKERS:                                  14.7   11.43 )-----------( 201      ( 09 May 2019 07:31 )             43.8     05-09    137  |  100  |  20  ----------------------------<  136<H>  4.2   |  30  |  0.92    Ca    9.5      09 May 2019 07:31  Mg     2.4     05-09      proBNP:   Lipid Profile:   HgA1c: Hemoglobin A1C, Whole Blood: 6.0 % (05-09 @ 07:31)    TSH: Thyroid Stimulating Hormone, Serum: 2.081 uIU/mL (05-09 @ 07:31)      ASSESSMENT/PLAN: 	    # CVA - multiple territories, must consider cardiac etiology.  recommend continue telemtry monitoring  call [phone] for ILR placement  LISSY to r/o intracardiac KALANI thrombus  Anticoagulation per neruology    Thoracic aortic aneurysm without rupture.  Plan: Pt with hx of thoracic aortic aneurysm s/p repair in 2016 by Dr Stevenson  status post valve sparing aortic root replacement, ascending and hemiarch replacement on 12/19/16.   Echo 3/2019: concentric LVH, EF 60%, akinesis of basal inferior wall, no significant valvulopathies       Coronary artery disease involving native coronary artery of native heart without angina pectoris.  Plan: Pt with hx of CAD with NSTEMI, s/p stent to RCA in Oct 2017 s/p DAPT x 1 year.  - Per chart review, cardiac cath during admission for NSTEMI: LM normal, 30% pLAD stenosis, distal LCx luminal irregularities, mid 100% thrombotic RCA lesion with L to R collaterals. LVEF 55%, basal inferior hypokinesis, LVEDP 14 mmHG, 24 mm gradient across the aortic valve: mild to moderate aortic stenosis  - C/w Asa 81 mg daily  - C/w lisinopril 2.5 daily, uptitrate as tolerated  - C/w atorvastatin 80mg qHS.  Hypertension, unspecified type. Plan: - C/w home medications: lisinopril 2.5mg daily, toprol xl 50mg daily.      Hyperlipidemia, unspecified hyperlipidemia type.  Plan: - c/w atorvastatin 80mg at HS  -  check lipid panel.

## 2019-05-09 NOTE — OCCUPATIONAL THERAPY INITIAL EVALUATION ADULT - STANDING BALANCE: DYNAMIC, REHAB EVAL
poor minus/Patient attempted to take side steps at EOB w/ LOB to R side requiring max assistance to maintain standing posture.

## 2019-05-09 NOTE — PHYSICAL THERAPY INITIAL EVALUATION ADULT - PERTINENT HX OF CURRENT PROBLEM, REHAB EVAL
Pt is a 55 yo male  presented to [facility] in [place], [place] (where he was for work) on 5/1/19 with acute onset dizziness, gait ataxia (falling to R), s/p tPA on 5/1, found to have subacute/acute L frontal and R cerebellar strokes and R distal vertebral artery occlusion of unclear chronicity, transferred to [facility] for further management.

## 2019-05-09 NOTE — SWALLOW BEDSIDE ASSESSMENT ADULT - NS SPL SWALLOW CLINIC TRIAL FT
PO trials limited to 2 ice chips, 2 tsp thin liquid + 2 tsp NTL. Pt with intermittent anterior loss (R side) d/t poor labial closure. Pt with immediate throat clear x1 + immediate productive cough with copious amounts of bloody secretions x2 all suggestive of potential airway protection deficits. Pt with c/o of difficulties getting liquid down. Laryngeal elevation appeared minimal upon palpation.

## 2019-05-09 NOTE — SWALLOW BEDSIDE ASSESSMENT ADULT - COMMENTS
Transfer from [facility] [place] in [place]. No formal swallow assessment completed. NGT placed Sunday (5/5).

## 2019-05-09 NOTE — OCCUPATIONAL THERAPY INITIAL EVALUATION ADULT - RANGE OF MOTION EXAMINATION, UPPER EXTREMITY
LUE AROM WFLs. RUE shoulder approximately 0-90 degrees, elbow/wrist/digits AROM WFLs. Patient reports h/o R shoulder decreased ROM 2/2 sports injuries.

## 2019-05-09 NOTE — PROGRESS NOTE ADULT - PROBLEM SELECTOR PLAN 1
Pt presented to [facility]) 5/1/19 with acute onset dizziness, gait ataxia (falling to R), s/p tPA on 5/1, found to have subacute/acute L frontal and R cerebellar strokes and R distal vertebral artery occlusion of unclear chronicity, transferred to [facility] for further management.  - Initial CTH negative, CTH 24 hours later reportedly negative for bleed.   - MRI showed subacute/acute L frontal and R cerebellar strokes.   - MRA negative  - TTE showed LVH and LVEF 60%  - Pt failed dysphagia screen, NGT placed for feeds and medications.  - Repeat CTH here  - S&S eval  - atorvastatin 80mg qHS  - hep gtt, monitor ptt until therapeutic x3 (goal 60-90)  - PT/OT  - Check hgb A1c, TSH, lipid panel  - Thorazine 25mg q8h for hiccups

## 2019-05-09 NOTE — OCCUPATIONAL THERAPY INITIAL EVALUATION ADULT - MD ORDER
Per chart, 55 yo PMH CAD, NSTEMI s/p thrombectomy/FADI to mRCA 10/17, aortic root aneurysm s/p repair 12/16 (Dr Stevenson), seen in ED for r/o stroke/TIA in 12/18, HTN, OLIVA (CPAP nightly) presented to [facility] in [place], [place] (work trip) on 5/1/19 w/ acute onset dizziness, gait ataxia (falling to R), nausea. At [place]: s/p tPA 5/1/19. CTH negative CTP showing small focal area of decreased perfusion L frontal area. CTA H/N R vertebral artery occlusion.

## 2019-05-09 NOTE — CONSULT NOTE ADULT - SUBJECTIVE AND OBJECTIVE BOX
Patient is a 54y old  Male who presents with a chief complaint of CVA (09 May 2019 08:16)       HPI:  54M PMH of CAD, NSTEMI s/p thrombectomy/FADI to mRCA (Oct 2017), aortic root aneurysm s/p repair 2016 (Dr Stevenson), was seen in ED for r/o stroke/TIA in Dec 2018 , HTN, OLIVA (CPAP nightly) presented to [facility] in [place], [place] (where he was for work) on 19 with acute onset dizziness, gait ataxia (falling to R), nausea. Following history obtained from Dr. Montez,  [phone] who cared for pt at [facility]: TPA pushed at 4:11 am on 19. Initial CTH negative. CTP showing small focal area of decreased perfusion in L frontal area. CTA H/N showed R distal vertebral artery occlusion of unclear chronicity. CTH 24 hours later reportedly negative for bleed. Pt started on hep gtt, asa 81, lipitor, losartan, lopressor.  MRI showed subacute/acute L frontal and R cerebellar strokes. MRA negative. TTE showed LVH and LVEF 60%. Pt failed dysphagia screen, NGT placed for feeds and medications. Pt ambulated with PT. Pt noted to have hiccups for which he was on baclofen (per patient without relief). Of note pt has been suctioning his own secretions and tubing noted to have blood tinged secretions. (08 May 2019 20:12)      PAST MEDICAL & SURGICAL HISTORY:  S/P coronary artery stent placement  H/O non-ST elevation myocardial infarction (NSTEMI)  Pre-diabetes  Thoracic aortic aneurysm without rupture  Hyperlipidemia  Obstructive sleep apnea  HTN (hypertension)  History of aortic aneurysm repair  Gynecomastia, male  H/O arthroscopy of left knee      MEDICATIONS  (STANDING):  aspirin enteric coated 81 milliGRAM(s) Oral daily  atorvastatin 80 milliGRAM(s) Oral at bedtime  chlorproMAZINE    Tablet 25 milliGRAM(s) Oral every 8 hours  heparin  Infusion 2000 Unit(s)/Hr (20 mL/Hr) IV Continuous <Continuous>  lisinopril 2.5 milliGRAM(s) Oral daily  metoprolol succinate ER 50 milliGRAM(s) Oral daily  oxymetazoline 0.05% Nasal Spray 1 Spray(s) Both Nostrils two times a day  piperacillin/tazobactam IVPB.      piperacillin/tazobactam IVPB. 4.5 Gram(s) IV Intermittent every 6 hours  vancomycin  IVPB 2000 milliGRAM(s) IV Intermittent every 12 hours    MEDICATIONS  (PRN):  sodium chloride 0.65% Nasal 1 Spray(s) Both Nostrils four times a day PRN Nasal Congestion      Social History: lived in [place], currently moving to his sister's house in [place] with 4 steps to enter, works as a  for [employer]    Functional Level Prior to Admission: ADL independent, walked without assistive devices    FAMILY HISTORY:  Family history of heart attack (Father)  Family history of brain cancer (Mother)      CBC Full  -  ( 09 May 2019 07:31 )  WBC Count : 11.43 K/uL  RBC Count : 4.82 M/uL  Hemoglobin : 14.7 g/dL  Hematocrit : 43.8 %  Platelet Count - Automated : 201 K/uL  Mean Cell Volume : 90.9 fl  Mean Cell Hemoglobin : 30.5 pg  Mean Cell Hemoglobin Concentration : 33.6 gm/dL  Auto Neutrophil # : x  Auto Lymphocyte # : x  Auto Monocyte # : x  Auto Eosinophil # : x  Auto Basophil # : x  Auto Neutrophil % : x  Auto Lymphocyte % : x  Auto Monocyte % : x  Auto Eosinophil % : x  Auto Basophil % : x      05-    137  |  100  |  20  ----------------------------<  136<H>  4.2   |  30  |  0.92    Ca    9.5      09 May 2019 07:31  Mg     2.4     05-09        Urinalysis Basic - ( 09 May 2019 04:57 )    Color: Yellow / Appearance: Clear / S.020 / pH: x  Gluc: x / Ketone: 15 mg/dL  / Bili: Negative / Urobili: 1.0 E.U./dL   Blood: x / Protein: 30 mg/dL / Nitrite: NEGATIVE   Leuk Esterase: NEGATIVE / RBC: < 5 /HPF / WBC < 5 /HPF   Sq Epi: x / Non Sq Epi: 0-5 /HPF / Bacteria: Present /HPF          Radiology:    < from: CT Brain Stroke Protocol (18 @ 03:46) >  EXAM:  CT BRAIN STROKE PROTOCOL                          EXAM:  CT PERFUSION W MAPS IC                          PROCEDURE DATE:  2018          INTERPRETATION:  PROCEDURE: CT head without contrast .    INDICATIONS: Right facial droop and visual loss.    TECHNIQUE: Serial axial images were obtained from the skull base to the   vertex without the use of intravenous contrast. Imaging is performed   using helical low-dose technique, and sagittal and coronal reformations   are provided.    COMPARISON EXAMINATION: None.    FINDINGS:    VENTRICLES AND SULCI: Normal.  INTRA-AXIAL: Chronic appearing small left delon lacunar infarction. There   is mild patchy hypodensity within the periventricular white matter which   is nonspecific and may represent the sequela of small vessel ischemic   disease. The gray-white differentiation is preserved without acute   transcortical infarction. No hemorrhage. No mass effect or midline shift.   EXTRA-AXIAL: No extra-axial fluid collection is present.   VISUALIZED SINUSES: No air-fluid levels are identified.   VISUALIZED MASTOIDS: Well developed and aerated bilaterally.  CALVARIUM: No calvarial fracture.  Miscellaneous: Vertebral artery calcifications.      IMPRESSION:   Chronic appearing small left delon lacunar infarction. No acute   intracranial hemorrhage, mass effect or CT evidence of recent   transcortical infarction at this time.        < from: CT Angio Head w/ IV Cont (18 @ 03:55) >  EXAM:  CT ANGIO BRAIN (W)AW IC                          EXAM:  CT ANGIO NECK (W)AW IC                          PROCEDURE DATE:  2018          INTERPRETATION:  PROCEDURE: CTA brain with intravenous contrast.    INDICATION: Right facial droop. Vision loss.    TECHNIQUE: Multiple thin section axial images were obtained through the   Cheesh-Na of [place]ise following the intravenous injection of contrast. MPR   sagittal and coronal MIP images were generated from the axial images. 3-D   surface renderings were also generated. 100 cc of contrast was   administered intravenously without reported complication. 0 cc was   discarded. Examination is degraded by suboptimal arterial opacification.    COMPARISON: None.    FINDINGS: The CTA examination of the Cheesh-Na of Denise demonstrates the   internal carotid arteries to be normal in caliber. There is a normal   bifurcation into the A1 and M1 segments. A1 segment of the right anterior   cerebral artery is hypoplastic. There is a normal MCA bifurcation.   Significant calcifications involving the distal vertebral arteries. Few   areas of significant stenosis involving the distal right vertebral   artery. The basilar artery is normal in caliber. There is a normal   bifurcation into the posterior cerebral arteries. There are no areas of   stenosis, dilatation or aneurysm.    IMPRESSION: No large vessel occlusion. Few areas of significant stenosis   involving the distal right vertebral artery.      PROCEDURE: CTA neck with intravenous contrast.    INDICATION: Right facial droop. Vision loss.    TECHNIQUE: Multiple axial thin section were obtained through the neck   following the intravenous injection of contrast. MPR sagittal and coronal   MIP images were generated from the axial images.  100 cc of contrast was   administered intravenously without reported complication. 0 cc was   discarded.    COMPARISON: None.    FINDINGS: The CTA examination demonstrates the right common carotid   artery to be normal in caliber. There is a normal bifurcation into the   right internal and external carotid arteries. There are calcifications at   the carotid bifurcation without hemodynamically significant stenosis.     The left common carotid artery is normal in caliber. There is a normal   bifurcationinto the left internal and external carotid arteries. There   are calcifications at the carotid bifurcation without hemodynamically   significant stenosis.     The visualized vertebral arteries are normal in caliber.    The aortic arch appears intactwithout narrowing of the origin of the   great vessels.     IMPRESSION: No carotid stenosis.             PROCEDURE: CT Perfusion with intravenous contrast.    INDICATION: Right facial droop and visual loss.    TECHNIQUE: Following the intravenous administration of 40 ml of Optiray   350, serial axial images were obtained through the brain. The CT   perfusion data set was post processed per [facility] protocol   generating color maps of CBF, CBV, MTT, and Tmax.    COMPARISON: None    FINDINGS: The CT perfusion study demonstrates no perfusion abnormality.   There is nomismatch volume.                  Vital Signs Last 24 Hrs  T(C): 36.7 (09 May 2019 05:39), Max: 38.4 (09 May 2019 01:37)  T(F): 98.1 (09 May 2019 05:39), Max: 101.2 (09 May 2019 01:37)  HR: 64 (09 May 2019 08:46) (62 - 80)  BP: 144/70 (09 May 2019 08:46) (144/70 - 161/112)  BP(mean): 100 (09 May 2019 08:46) (100 - 130)  RR: 18 (09 May 2019 08:46) (16 - 18)  SpO2: 97% (09 May 2019 08:46) (92% - 99%)    REVIEW OF SYSTEMS:    CONSTITUTIONAL: No fever, weight loss, or fatigue  EYES: No eye pain, visual disturbances, or discharge  ENMT:  No difficulty hearing, tinnitus, vertigo; No sinus or throat pain  NECK: No pain or stiffness  BREASTS: No pain, masses, or nipple discharge  RESPIRATORY: No cough, wheezing, chills or hemoptysis; No shortness of breath  CARDIOVASCULAR: No chest pain, palpitations, dizziness, or leg swelling  GASTROINTESTINAL: hiccups  GENITOURINARY: No dysuria, frequency, hematuria, or incontinence  NEUROLOGICAL: No headaches, memory loss, loss of strength, numbness, or tremors  SKIN: No itching, burning, rashes, or lesions   LYMPH NODES: No enlarged glands  ENDOCRINE: No heat or cold intolerance; No hair loss  MUSCULOSKELETAL: No joint pain or swelling; No muscle, back, or extremity pain  PSYCHIATRIC: No depression, anxiety, mood swings, or difficulty sleeping  HEME/LYMPH: No easy bruising, or bleeding gums  ALLERGY AND IMMUNOLOGIC: No hives or eczema  VASCULAR: no swelling, erythema      Physical Exam: WDWN 55 yo  gentleman lying in semi Hood's position, c/o hiccups    Head: normocephalic, atraumatic    Eyes: PERRLA, EOMI, no nystagmus, sclera anicteric    ENT: nasal discharge, uvula midline, no oropharyngeal erythema/exudate    Neck: supple, negative JVD, negative carotid bruits, no thyromegaly    Chest: CTA bilaterally, neg wheeze, rhonchi, rales, crackles, egophany    Cardiovascular: regular rate and rhythm, neg murmurs/rubs/gallops    Abdomen: soft, non distended, non tender, negative rebound/guarding, normal bowel sounds, neg hepatosplenomegaly    Extremities: WWP, neg cyanosis/clubbing/edema, negative calf tenderness to palpation, negative Gay's sign    :     Neurologic Exam:    Alert and oriented to person, place, date/year, speech fluent w/o dysarthria, recent and remote memory intact, repetition intact, comprehension intact,     Cranial Nerves:     II:                       pupils equal, round and reactive to light, visual fields intact   III/ IV/VI:             extraocular movements intact, neg nystagmus, neg ptosis  V:                       facial sensation intact, V1-3 normal  VII:                     face symmetric, no droop, normal eye closure and smile  VIII:                    hearing intact to finger rub bilaterally  IX/ X:                 soft palate rise symmetrical  XI:                      head turning, shoulder shrug normal  XII:                     tongue midline    Motor Exam:    Upper Extremities:     RIght:   5/5   /intrinsics               5/5  wrist flexors/extensors               5/5  biceps/triceps               5/5  deltoid               negative drift    Left :     5/5  /intrinsics               5/5  wrist flexors/extensors               5/5 biceps/triceps               5/5 deltoid               negative drift    Lower Extremities:                 Right:     5/5  DF/PF/ evertors/ invertors                5/5  Quad/ hamstrings                5/5  hip flexors/adductors/abductors                negative drift    Left:       5/5  DF/PF/ evertors/ invertors                5/5  Quad/ hamstrings                5/5  hip flexors/adductors/abductors                       Sensory:    intact to LT/PP in all UE/LE dermatomes    DTR:            = biceps/     triceps/     brachioradialis                      = patella/   medial hamstring/ankle                      neg clonus                      neg Babinski                        Finger to Nose:  right dysmetria    Heel to Shin:  wnl    Rapid Alternating movements:  right dysdiadochinesia    Joint Position Sense:  intact    Romberg:  not tested    Tandem Walking:  not tested    Gait:  not tested        PM&R Impression:    1) acute/subacute L frontal and R cerebellar strokes and R distal vertebral artery occlusion , s/p tPA 2019        Recommendations:    1) Physical therapy focusing on therapeutic exercises, bed mobility/transfer out of bed evaluation, progressive ambulation with assistive devices prn.    2) Anticipated Disposition Plan/Recs: pending functional progress

## 2019-05-09 NOTE — DIETITIAN INITIAL EVALUATION ADULT. - PROBLEM SELECTOR PLAN 1
Pt presented to [facility]) 5/1/19 with acute onset dizziness, gait ataxia (falling to R), s/p tPA on 5/1, found to have subacute/acute L frontal and R cerebellar strokes and R distal vertebral artery occlusion of unclear chronicity, transferred to [facility] for further management.  - Initial CTH negative, CTH 24 hours later reportedly negative for bleed.   - MRI showed subacute/acute L frontal and R cerebellar strokes.   - MRA negative  - TTE showed LVH and LVEF 60%  - Pt failed dysphagia screen, NGT placed for feeds and medications.  - Repeat CTH here  - S&S eval  - atorvastatin 80mg qHS  - hep gtt, monitor ptt until therapeutic x3 (goal 60-90)  - PT/OT  - Check hgb A1c, TSH, lipid panel  - Thorazine 25mg q8h for hiccups

## 2019-05-09 NOTE — SWALLOW BEDSIDE ASSESSMENT ADULT - SWALLOW EVAL: DIAGNOSIS
Suspect oropharyngeal dysphagia as evidenced by labial spillage, overt s/s of aspiration, and c/o difficulties with bolus transport. Continued recs for NPO + NGT with the exception of 3 ice chips per hour for swallowing practice and pleasure. This service will continue to follow for ongoing reassessment to determine appropriateness for instrumental testing.

## 2019-05-09 NOTE — DIETITIAN INITIAL EVALUATION ADULT. - ENERGY NEEDS
Height 72"; #; #; 151%IBW  BMI 36.5  Ideal body weight used for calculations as pt >120% of IBW. Needs estimated for maintenance in adults; increased protein for s/p CVA

## 2019-05-09 NOTE — SWALLOW BEDSIDE ASSESSMENT ADULT - SLP PERTINENT HISTORY OF CURRENT PROBLEM
Subacute/acute L frontal and R cerebellar stroke s/p TPA; PMHX cof CAD, NSTEMI s/p thrombectomy/FADI to mRCA, aortic root aneurysm s/p repair, HTN, OLIVA

## 2019-05-09 NOTE — OCCUPATIONAL THERAPY INITIAL EVALUATION ADULT - MANUAL MUSCLE TESTING RESULTS, REHAB EVAL
w/in AROM, b/l MMT 5/5 throughout. Smile symmetrical, tongue protrusion in midline, cheeks puff and eyebrows elevation grossly intact.

## 2019-05-09 NOTE — CONSULT NOTE ADULT - ASSESSMENT
54M PMH of CAD, NSTEMI s/p thrombectomy/FADI to mRCA (Oct 2017), aortic root aneurysm s/p repair 12/2016 (Dr Stevenson), was seen in ED for r/o stroke/TIA in Dec 2018 , HTN, OLIVA (CPAP nightly) presented to [facility] in [place], [place] (where he was for work) on 5/1/19 with acute onset dizziness, gait ataxia (falling to R), s/p tPA on 5/1, found to have subacute/acute L frontal and R cerebellar strokes and R distal vertebral artery occlusion of unclear chronicity, transferred to [facility] for further management.    Problem/Plan - 1:  ·  Problem: Cerebrovascular accident (CVA), unspecified mechanism.  Plan: Pt presented to [facility] ([place] 5/1/19 with acute onset dizziness, gait ataxia (falling to R), s/p tPA on 5/1, found to have subacute/acute L frontal and R cerebellar strokes and R distal vertebral artery occlusion of unclear chronicity, transferred to [facility] for further management.  - Initial CTH negative, CTH 24 hours later reportedly negative for bleed.   - MRI showed subacute/acute L frontal and R cerebellar strokes.   - MRA negative  - TTE showed LVH and LVEF 60%  - Pt failed dysphagia screen, NGT placed for feeds and medications.  - Repeat CTH here  - S&S eval  - atorvastatin 80mg qHS  - hep gtt, monitor ptt until therapeutic x3 (goal 60-90)  - Check hgb A1c, TSH, lipid panel  - Thorazine 25mg q8h for hiccups.     Problem/Plan - 2:  ·  Problem: Occlusion of right vertebral artery.  Plan: Plan as above.     Problem/Plan - 3:  ·  Problem: Dysphagia, unspecified type.  Plan: Pt noted to have dysphagia, failed dysphagia screen, arrived with NGT from [facility]. Etiology likely 2/2 stroke  - Formal speech and swallow eval  - NGT placement confirmed by radiology here.     Problem/Plan - 4:  ·  Problem: Thoracic aortic aneurysm without rupture.  Plan: Pt with hx of thoracic aortic aneurysm s/p repair in 2016 by Dr Stevenson  - Per chart review: status post valve sparing aortic root replacement, ascending and hemiarch replacement on 12/19/16. Echo 3/2019: concentric LVH, EF 60%, akinesis of basal inferior wall, no significant valvulopathies   - Contact Dr Stevenson to make him aware pt is here.     Problem/Plan - 5:  ·  Problem: Coronary artery disease involving native coronary artery of native heart without angina pectoris.  Plan: Pt with hx of CAD with NSTEMI, s/p stent to RCA in Oct 2017 s/p DAPT x 1 year.  - Per chart review, cardiac cath during admission for NSTEMI: LM normal, 30% pLAD stenosis, distal LCx luminal irregularities, mid 100% thrombotic RCA lesion with L to R collaterals. LVEF 55%, basal inferior hypokinesis, LVEDP 14 mmHG, 24 mm gradient across the aortic valve: mild to moderate aortic stenosis  - C/w Asa 81 mg daily  - C/w lisinopril 2.5 daily, uptitrate as tolerated  - C/w atorvastatin 80mg qHS.     Problem/Plan - 6:  Problem: Hypertension, unspecified type. Plan: - C/w home medications: lisinopril 2.5mg daily, toprol xl 50mg daily.    Problem/Plan - 7:  ·  Problem: Hyperlipidemia, unspecified hyperlipidemia type.  Plan: - c/w atorvastatin 80mg at HS  -  check lipid panel.     Problem/Plan - 8:  ·  Problem: Pre-diabetes.  Plan: - check a1c.     Problem/Plan - 9:  ·  Problem: Obstructive sleep apnea.  Plan: Uses CPAP nightly  - c/w CPAP at night.     Problem/Plan - 10:  Problem: Nutrition, metabolism, and development symptoms. Plan; F: No IVF  E: Replete prn  N: NPO, plan to start TF pending nutrition eval    PPX: on hep gtt full AC, SCDs  Code: Full

## 2019-05-10 LAB
ANION GAP SERPL CALC-SCNC: 9 MMOL/L — SIGNIFICANT CHANGE UP (ref 5–17)
BUN SERPL-MCNC: 21 MG/DL — SIGNIFICANT CHANGE UP (ref 7–23)
CALCIUM SERPL-MCNC: 9.6 MG/DL — SIGNIFICANT CHANGE UP (ref 8.4–10.5)
CHLORIDE SERPL-SCNC: 101 MMOL/L — SIGNIFICANT CHANGE UP (ref 96–108)
CO2 SERPL-SCNC: 31 MMOL/L — SIGNIFICANT CHANGE UP (ref 22–31)
CREAT SERPL-MCNC: 0.92 MG/DL — SIGNIFICANT CHANGE UP (ref 0.5–1.3)
GLUCOSE SERPL-MCNC: 156 MG/DL — HIGH (ref 70–99)
HCT VFR BLD CALC: 44.2 % — SIGNIFICANT CHANGE UP (ref 39–50)
HGB BLD-MCNC: 14.5 G/DL — SIGNIFICANT CHANGE UP (ref 13–17)
MAGNESIUM SERPL-MCNC: 2.4 MG/DL — SIGNIFICANT CHANGE UP (ref 1.6–2.6)
MCHC RBC-ENTMCNC: 29.7 PG — SIGNIFICANT CHANGE UP (ref 27–34)
MCHC RBC-ENTMCNC: 32.8 GM/DL — SIGNIFICANT CHANGE UP (ref 32–36)
MCV RBC AUTO: 90.6 FL — SIGNIFICANT CHANGE UP (ref 80–100)
NRBC # BLD: 0 /100 WBCS — SIGNIFICANT CHANGE UP (ref 0–0)
PLATELET # BLD AUTO: 211 K/UL — SIGNIFICANT CHANGE UP (ref 150–400)
POTASSIUM SERPL-MCNC: 4.4 MMOL/L — SIGNIFICANT CHANGE UP (ref 3.5–5.3)
POTASSIUM SERPL-SCNC: 4.4 MMOL/L — SIGNIFICANT CHANGE UP (ref 3.5–5.3)
RBC # BLD: 4.88 M/UL — SIGNIFICANT CHANGE UP (ref 4.2–5.8)
RBC # FLD: 11.5 % — SIGNIFICANT CHANGE UP (ref 10.3–14.5)
SODIUM SERPL-SCNC: 141 MMOL/L — SIGNIFICANT CHANGE UP (ref 135–145)
VANCOMYCIN TROUGH SERPL-MCNC: 9.7 UG/ML — LOW (ref 10–20)
WBC # BLD: 10.85 K/UL — HIGH (ref 3.8–10.5)
WBC # FLD AUTO: 10.85 K/UL — HIGH (ref 3.8–10.5)

## 2019-05-10 PROCEDURE — 74018 RADEX ABDOMEN 1 VIEW: CPT | Mod: 26

## 2019-05-10 PROCEDURE — 99232 SBSQ HOSP IP/OBS MODERATE 35: CPT

## 2019-05-10 RX ORDER — ACETAMINOPHEN 500 MG
650 TABLET ORAL ONCE
Refills: 0 | Status: COMPLETED | OUTPATIENT
Start: 2019-05-10 | End: 2019-05-10

## 2019-05-10 RX ORDER — LISINOPRIL 2.5 MG/1
5 TABLET ORAL ONCE
Refills: 0 | Status: COMPLETED | OUTPATIENT
Start: 2019-05-10 | End: 2019-05-10

## 2019-05-10 RX ORDER — METOCLOPRAMIDE HCL 10 MG
5 TABLET ORAL ONCE
Refills: 0 | Status: COMPLETED | OUTPATIENT
Start: 2019-05-10 | End: 2019-05-10

## 2019-05-10 RX ORDER — ACETAMINOPHEN 500 MG
1000 TABLET ORAL ONCE
Refills: 0 | Status: COMPLETED | OUTPATIENT
Start: 2019-05-10 | End: 2019-05-10

## 2019-05-10 RX ORDER — ENOXAPARIN SODIUM 100 MG/ML
120 INJECTION SUBCUTANEOUS
Refills: 0 | Status: COMPLETED | OUTPATIENT
Start: 2019-05-11 | End: 2019-05-12

## 2019-05-10 RX ORDER — BACLOFEN 100 %
20 POWDER (GRAM) MISCELLANEOUS THREE TIMES A DAY
Refills: 0 | Status: DISCONTINUED | OUTPATIENT
Start: 2019-05-10 | End: 2019-05-11

## 2019-05-10 RX ORDER — VANCOMYCIN HCL 1 G
2000 VIAL (EA) INTRAVENOUS EVERY 12 HOURS
Refills: 0 | Status: DISCONTINUED | OUTPATIENT
Start: 2019-05-10 | End: 2019-05-12

## 2019-05-10 RX ORDER — LISINOPRIL 2.5 MG/1
10 TABLET ORAL EVERY 24 HOURS
Refills: 0 | Status: DISCONTINUED | OUTPATIENT
Start: 2019-05-11 | End: 2019-05-13

## 2019-05-10 RX ADMIN — Medication 650 MILLIGRAM(S): at 23:03

## 2019-05-10 RX ADMIN — Medication 400 MILLIGRAM(S): at 19:51

## 2019-05-10 RX ADMIN — Medication 650 MILLIGRAM(S): at 22:41

## 2019-05-10 RX ADMIN — ATORVASTATIN CALCIUM 80 MILLIGRAM(S): 80 TABLET, FILM COATED ORAL at 22:41

## 2019-05-10 RX ADMIN — Medication 5 MILLIGRAM(S): at 06:17

## 2019-05-10 RX ADMIN — OXYMETAZOLINE HYDROCHLORIDE 1 SPRAY(S): 0.5 SPRAY NASAL at 17:04

## 2019-05-10 RX ADMIN — LISINOPRIL 5 MILLIGRAM(S): 2.5 TABLET ORAL at 14:23

## 2019-05-10 RX ADMIN — Medication 81 MILLIGRAM(S): at 11:40

## 2019-05-10 RX ADMIN — PIPERACILLIN AND TAZOBACTAM 200 GRAM(S): 4; .5 INJECTION, POWDER, LYOPHILIZED, FOR SOLUTION INTRAVENOUS at 00:43

## 2019-05-10 RX ADMIN — Medication 20 MILLIGRAM(S): at 09:03

## 2019-05-10 RX ADMIN — OXYMETAZOLINE HYDROCHLORIDE 1 SPRAY(S): 0.5 SPRAY NASAL at 05:38

## 2019-05-10 RX ADMIN — Medication 250 MILLIGRAM(S): at 06:17

## 2019-05-10 RX ADMIN — GABAPENTIN 300 MILLIGRAM(S): 400 CAPSULE ORAL at 17:04

## 2019-05-10 RX ADMIN — Medication 20 MILLIGRAM(S): at 13:35

## 2019-05-10 RX ADMIN — HEPARIN SODIUM 7500 UNIT(S): 5000 INJECTION INTRAVENOUS; SUBCUTANEOUS at 05:36

## 2019-05-10 RX ADMIN — Medication 25 MILLIGRAM(S): at 17:04

## 2019-05-10 RX ADMIN — Medication 400 MILLIGRAM(S): at 09:47

## 2019-05-10 RX ADMIN — PIPERACILLIN AND TAZOBACTAM 200 GRAM(S): 4; .5 INJECTION, POWDER, LYOPHILIZED, FOR SOLUTION INTRAVENOUS at 17:03

## 2019-05-10 RX ADMIN — Medication 1000 MILLIGRAM(S): at 20:58

## 2019-05-10 RX ADMIN — Medication 1000 MILLIGRAM(S): at 10:00

## 2019-05-10 RX ADMIN — Medication 250 MILLIGRAM(S): at 19:01

## 2019-05-10 RX ADMIN — PIPERACILLIN AND TAZOBACTAM 200 GRAM(S): 4; .5 INJECTION, POWDER, LYOPHILIZED, FOR SOLUTION INTRAVENOUS at 05:32

## 2019-05-10 RX ADMIN — PIPERACILLIN AND TAZOBACTAM 200 GRAM(S): 4; .5 INJECTION, POWDER, LYOPHILIZED, FOR SOLUTION INTRAVENOUS at 11:40

## 2019-05-10 NOTE — SWALLOW FEES ASSESSMENT ADULT - COMMENTS
reduced vocal fold abduction Risks, benefits, and alternatives reviewed with pt and his brother. Verbal consent provided. Pt tolerated the passing of the scope. reduced mobility of R vocal cord-confirmed by ENT

## 2019-05-10 NOTE — PROGRESS NOTE ADULT - SUBJECTIVE AND OBJECTIVE BOX
INTERVAL HPI/OVERNIGHT EVENTS:  Patient was seen and examined at bedside. C/o severe hiccups o/n, still copious secretions and regurgitated out NGT today.   Patient denies: fever, chills, dizziness, weakness, HA, Changes in vision, CP, palpitations, SOB, cough, N/V/D/C, dysuria, changes in bowel movements, LE edema. ROS otherwise negative.    VITAL SIGNS:  T(F): 98.6 (05-10-19 @ 05:30)  HR: 62 (05-10-19 @ 08:38)  BP: 160/74 (05-10-19 @ 08:38)  RR: 16 (05-10-19 @ 08:38)  SpO2: 91% (05-10-19 @ 08:38)  Wt(kg): --    PHYSICAL EXAM:    Neurologic:  - Mental Status:  AAOx3; speech is fluent with intact naming, repetition, and comprehension  - Cranial Nerves II-XII:    II:  PERRLA; visual fields are full to confrontation  III, IV, VI:  EOMI, nystagmus on right gaze   V:  facial sensation is intact in the V1-V3 distribution bilaterally.  VII:  face is symmetric with normal eye closure and smile  VIII:  hearing is intact to finger rub  IX, X:  uvula is midline and soft palate rises symmetrically  XI:  head turning and shoulder shrug are intact bilaterally  XII:  tongue protrudes in the midline  - Motor:  strength is 5/5 throughout; normal muscle bulk and tone throughout; no pronator drift  - Reflexes:  2+ and symmetric at the biceps, triceps, brachioradialis, knees, and ankles;  plantar reflexes downgoing bilaterally  - Sensory:  intact to light touch, pin prick, vibration, and joint-position sense throughout  - Coordination:  finger-nose-finger and heel-knee-shin intact without dysmetria; rapid alternating hand movements intact, right drift  - Gait: deferred      MEDICATIONS  (STANDING):  acetaminophen  IVPB .. 1000 milliGRAM(s) IV Intermittent once  aspirin  chewable 81 milliGRAM(s) Oral daily  atorvastatin 80 milliGRAM(s) Oral at bedtime  baclofen 20 milliGRAM(s) Oral three times a day  gabapentin   Solution 300 milliGRAM(s) Oral two times a day  heparin  Injectable 7500 Unit(s) SubCutaneous every 8 hours  lisinopril 2.5 milliGRAM(s) Oral daily  metoprolol tartrate 25 milliGRAM(s) Oral two times a day  oxymetazoline 0.05% Nasal Spray 1 Spray(s) Both Nostrils two times a day  piperacillin/tazobactam IVPB. 4.5 Gram(s) IV Intermittent every 6 hours  piperacillin/tazobactam IVPB.      vancomycin  IVPB 2000 milliGRAM(s) IV Intermittent every 12 hours    MEDICATIONS  (PRN):  sodium chloride 0.65% Nasal 1 Spray(s) Both Nostrils four times a day PRN Nasal Congestion      Allergies    erythromycin (Unknown)  tetanus toxoids (Fever)    Intolerances        LABS:                        14.5   10.85 )-----------( 211      ( 10 May 2019 06:32 )             44.2     05-10    141  |  101  |  21  ----------------------------<  156<H>  4.4   |  31  |  0.92    Ca    9.6      10 May 2019 06:32  Mg     2.4     05-10      PT/INR - ( 09 May 2019 07:31 )   PT: 14.6 sec;   INR: 1.28          PTT - ( 09 May 2019 15:10 )  PTT:49.6 sec  Urinalysis Basic - ( 09 May 2019 04:57 )    Color: Yellow / Appearance: Clear / S.020 / pH: x  Gluc: x / Ketone: 15 mg/dL  / Bili: Negative / Urobili: 1.0 E.U./dL   Blood: x / Protein: 30 mg/dL / Nitrite: NEGATIVE   Leuk Esterase: NEGATIVE / RBC: < 5 /HPF / WBC < 5 /HPF   Sq Epi: x / Non Sq Epi: 0-5 /HPF / Bacteria: Present /HPF        RADIOLOGY & ADDITIONAL TESTS:  Reviewed

## 2019-05-10 NOTE — SWALLOW FEES ASSESSMENT ADULT - ADDITIONAL RECOMMENDATIONS
1) intensive dysphagia tx, in acute rehab setting, to improve swallow efficiency.  2) repeat instrumental testing ~3-4 weeks following tx or when clinically indicated by SLP  3) oral care at least 2x per day

## 2019-05-10 NOTE — SWALLOW FEES ASSESSMENT ADULT - DIAGNOSTIC IMPRESSIONS
Pt presents with severe pharyngeal dysphagia. Aspiration occurred during the swallow d/t reduced strength/poor bolus control and after the swallow d/t residue spilling over into the laryngeal vestibule. Positional maneuvers and swallowing strategies did not eliminate aspiration.

## 2019-05-10 NOTE — PROGRESS NOTE ADULT - PROBLEM SELECTOR PLAN 3
Pt noted to have dysphagia, failed dysphagia screen, arrived with NGT from [facility]. Etiology likely 2/2 stroke  - Formal speech and swallow eval  - NGT placement confirmed by radiology here

## 2019-05-10 NOTE — SWALLOW FEES ASSESSMENT ADULT - PHARYNGEAL PHASE COMMENTS
Pharyngeal phase was significant for a delay in swallow initiation. Swallow was triggered with the bolus head in the pyriforms with thin liquids. Swallow was noted to be more timely with NTL. However, significant increase in post-deglutitive residue with more viscous boli. Epiglottic inversion was incomplete. Pt with aspiration during the swallow likely d/t reduced bolus control and decreased strength of the swallow (as evidenced by incomplete whiteout period visualized during the swallow). Pt with an immediate an strong reflexive cough resulting in clearance of penetrated material in the laryngeal vestibule. However, aspiration consistently occurred after the swallow from residue spilling over the arytenoids and posterior commissure. An effortful swallow, head turn in both directions, and chin tuck were all ineffective at reducing aspiration. Trials were discontinued and study was terminated d/t the severity of deficits.

## 2019-05-10 NOTE — PROGRESS NOTE ADULT - SUBJECTIVE AND OBJECTIVE BOX
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 Following history obtained from Dr. Montez, Jan [phone] who cared for pt at [place]: TPA pushed at 4:11 am on 5/1/19. Initial CTH negative. CTP showing small focal area of decreased perfusion in L frontal area. CTA H/N showed R distal vertebral artery occlusion of unclear chronicity. CTH 24 hours later reportedly negative for bleed. Pt started on hep gtt, asa 81, lipitor, losartan, lopressor.  MRI showed subacute/acute L frontal and R cerebellar strokes. MRA negative. TTE showed LVH and LVEF 60%. Pt failed dysphagia screen, NGT placed for feeds and medications. Pt ambulated with PT. Pt noted to have hiccups for which he was on baclofen (per patient without relief). Of note pt has been suctioning his own secretions and tubing noted to have blood tinged secretions. (08 May 2019 20:12)                            14.5   10.85 )-----------( 211      ( 10 May 2019 06:32 )             44.2       05-10    141  |  101  |  21  ----------------------------<  156<H>  4.4   |  31  |  0.92    Ca    9.6      10 May 2019 06:32  Mg     2.4     05-10      Vital Signs Last 24 Hrs  T(C): 37.1 (10 May 2019 16:37), Max: 37.2 (10 May 2019 01:16)  T(F): 98.7 (10 May 2019 16:37), Max: 99 (10 May 2019 01:16)  HR: 76 (10 May 2019 16:12) (61 - 76)  BP: 172/80 (10 May 2019 13:57) (121/61 - 177/82)  BP(mean): 118 (10 May 2019 11:48) (83 - 118)  RR: 18 (10 May 2019 16:12) (16 - 26)  SpO2: 91% (10 May 2019 16:12) (90% - 96%)    MEDICATIONS  (STANDING):  atorvastatin 80 milliGRAM(s) Oral at bedtime  baclofen 20 milliGRAM(s) Oral three times a day  gabapentin   Solution 300 milliGRAM(s) Oral two times a day  metoprolol tartrate 25 milliGRAM(s) Oral two times a day  oxymetazoline 0.05% Nasal Spray 1 Spray(s) Both Nostrils two times a day  piperacillin/tazobactam IVPB. 4.5 Gram(s) IV Intermittent every 6 hours  piperacillin/tazobactam IVPB.        MEDICATIONS  (PRN):  sodium chloride 0.65% Nasal 1 Spray(s) Both Nostrils four times a day PRN Nasal Congestion    Currently Undergoing Physical Therapy at bedside.    Functional Status Assessment:    Previous Level of Function:     · Ambulation Skills	independent	  · Transfer Skills	independent	  · ADL Skills	independent	  · Work/Leisure Activity	independent	  · Additional Comments	Pt lives with sister in the private house in [place], 3 steps walk into the house, 2 steps walk up.	    Cognitive Status Examination:   · Orientation	oriented to person, place, time and situation	  · Level of Consciousness	alert	  · Follows Commands and Answers Questions	100% of the time	  · Personal Safety and Judgment	intact	    Range of Motion Exam:   · Active Range of Motion Examination	AROM WFL through out	    Manual Muscle Testing:   · Manual Muscle Testing Results	LUE~5/5, RUE~4/5, LLE~5/5 grossly, RLE~4/5 grossly	    Bed Mobility: Scooting/Bridging:     · Level of [place]	moderate assist (50% patients effort)	  · Physical Assist/Nonphysical Assist	1 person assist; verbal cues; nonverbal cues (demo/gestures)	    Bed Mobility: Sit to Supine:     · Level of [place]	moderate assist (50% patients effort)	  · Physical Assist/Nonphysical Assist	1 person assist; verbal cues; nonverbal cues (demo/gestures)	    Bed Mobility: Supine to Sit:     · Level of [place]	moderate assist (50% patients effort)	  · Physical Assist/Nonphysical Assist	1 person assist; verbal cues; nonverbal cues (demo/gestures)	    Bed Mobility Analysis:     · Bed Mobility Limitations	decreased ability to use arms for pushing/pulling; decreased ability to use legs for bridging/pushing; impaired ability to control trunk for mobility	  · Impairments Contributing to Impaired Bed Mobility	impaired balance; decreased strength; impaired postural control	    Transfer: Sit to Stand:     · Level of [place]	moderate assist (50% patients effort)	  · Physical Assist/Nonphysical Assist	2 person assist; verbal cues; nonverbal cues (demo/gestures)	  · Weight-Bearing Restrictions	weight-bearing as tolerated	  · Assistive Device	b/l HHA	    Transfer: Stand to Sit:     · Level of [place]	moderate assist (50% patients effort)	  · Physical Assist/Nonphysical Assist	2 person assist; verbal cues; nonverbal cues (demo/gestures)	  · Weight-Bearing Restrictions	weight-bearing as tolerated	  · Assistive Device	b/l HHA	    Sit/Stand Transfer Safety Analysis:     · Impairments Contributing to Impaired Transfers	impaired balance; decreased strength; impaired postural control	    Gait Skills:     · Level of [place]	moderate assist (50% patients effort); maximum assist (25% patients effort)	  · Physical Assist/Nonphysical Assist	2 person assist; verbal cues; nonverbal cues (demo/gestures)	  · Weight-Bearing Restrictions	weight-bearing as tolerated	  · Assistive Device	b/l HHA	  · Gait Distance	10 side steps	    Gait Analysis:     · Gait Deviations Noted	increased time in double stance; decreased step length; decreased weight-shifting ability; difficulty to advance b/l LEs, requires max verbal and tactile cues for wt shifting, +ataxia on RLE, occasionally buckling on R knee during side stepping.	  · Impairments Contributing to Gait Deviations	impaired balance; ataxic; impaired motor control; impaired postural control; decreased strength	    Stair Negotiation:     · Level of [place]	unable to perform	    Balance Skills Assessment:     · Sitting Balance: Static	fair minus  R listing, requires verbal cues to correct	  · Sitting Balance: Dynamic	poor plus	  · Sit-to-Stand Balance	poor minus	  · Standing Balance: Dynamic	poor minus	    Sensory Examination:   Sensory Examination:    Light Touch Sensation:   · Left UE	within normal limits	  · Right UE	within normal limits	  · Left LE	within normal limits	  · Right LE	within normal limits	    · Coordination Assessed	finger to nose; dysmetria and ataxic with RUE, intact on LUE	      Proprioception:   · Coordination Assessed	finger to nose; dysmetria and ataxic with RUE, intact on LUE	      Treatment Location:   · Comments	CN Testing: B/L Frontalis intact; B/L buccinator intact; smile symmetrical; tongue protrusion at midline; B/L eyes open/close intact; Shoulder elevation: intact bilaterally; Vision H-Test: bilateral tracking and smooth pursuit intact; Convergence/Divergence: intact; Vision Quadrant Test: intact bilaterally	    Clinical Impressions:   · Criteria for Skilled Therapeutic Interventions	impairments found; functional limitations in following categories; rehab potential; therapy frequency; anticipated discharge recommendation	  · Impairments Found (describe specific impairments)	aerobic capacity/endurance; gross motor; gait, locomotion, and balance; muscle strength	  · Functional Limitations in Following Categories (describe specific limitations)	self-care; home management; work; community/leisure	  · Rehab Potential	good, to achieve stated therapy goals	  · Therapy Frequency	3-5x/week	          PM&R Impression: as above    Disposition Plan Recommendations: acute rehab placement

## 2019-05-10 NOTE — PROGRESS NOTE ADULT - PROBLEM SELECTOR PLAN 1
Pt presented to [facility]) 5/1/19 with acute onset dizziness, gait ataxia (falling to R), s/p tPA on 5/1, found to have subacute/acute L frontal and R cerebellar strokes and R distal vertebral artery occlusion of unclear chronicity, transferred to [facility] for further management.  - Initial CTH negative, CTH 24 hours later reportedly negative for bleed.   - MRI showed subacute/acute L frontal and R cerebellar strokes.   - MRA negative  - TTE showed LVH and LVEF 60%  - Pt failed dysphagia screen, NGT placed for feeds and medications.  - Repeat CTH here  - S&S eval  - atorvastatin 80mg qHS  - hep gtt, monitor ptt until therapeutic x3 (goal 60-90)  - PT/OT  - Check hgb A1c, TSH, lipid panel  - Thorazine 25mg q8h for hiccups

## 2019-05-10 NOTE — CONSULT NOTE ADULT - ASSESSMENT
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 Surgery consulted for PEG tube placement    -Will add on for Monday  -Please get T&S and coags sunday night  -Make NPO/ hold TF Sunday at [place]  -Please hold lovanox dose prior to PEG placement  -Discussed with Dr Pickard

## 2019-05-11 DIAGNOSIS — J18.9 PNEUMONIA, UNSPECIFIED ORGANISM: ICD-10-CM

## 2019-05-11 LAB
ANION GAP SERPL CALC-SCNC: 13 MMOL/L — SIGNIFICANT CHANGE UP (ref 5–17)
BUN SERPL-MCNC: 23 MG/DL — SIGNIFICANT CHANGE UP (ref 7–23)
CALCIUM SERPL-MCNC: 9.4 MG/DL — SIGNIFICANT CHANGE UP (ref 8.4–10.5)
CHLORIDE SERPL-SCNC: 100 MMOL/L — SIGNIFICANT CHANGE UP (ref 96–108)
CO2 SERPL-SCNC: 27 MMOL/L — SIGNIFICANT CHANGE UP (ref 22–31)
CREAT SERPL-MCNC: 0.88 MG/DL — SIGNIFICANT CHANGE UP (ref 0.5–1.3)
GLUCOSE BLDC GLUCOMTR-MCNC: 116 MG/DL — HIGH (ref 70–99)
GLUCOSE SERPL-MCNC: 140 MG/DL — HIGH (ref 70–99)
HCT VFR BLD CALC: 46.1 % — SIGNIFICANT CHANGE UP (ref 39–50)
HGB BLD-MCNC: 15.2 G/DL — SIGNIFICANT CHANGE UP (ref 13–17)
MAGNESIUM SERPL-MCNC: 2.3 MG/DL — SIGNIFICANT CHANGE UP (ref 1.6–2.6)
MCHC RBC-ENTMCNC: 29.6 PG — SIGNIFICANT CHANGE UP (ref 27–34)
MCHC RBC-ENTMCNC: 33 GM/DL — SIGNIFICANT CHANGE UP (ref 32–36)
MCV RBC AUTO: 89.7 FL — SIGNIFICANT CHANGE UP (ref 80–100)
NRBC # BLD: 0 /100 WBCS — SIGNIFICANT CHANGE UP (ref 0–0)
PLATELET # BLD AUTO: 249 K/UL — SIGNIFICANT CHANGE UP (ref 150–400)
POTASSIUM SERPL-MCNC: 4.2 MMOL/L — SIGNIFICANT CHANGE UP (ref 3.5–5.3)
POTASSIUM SERPL-SCNC: 4.2 MMOL/L — SIGNIFICANT CHANGE UP (ref 3.5–5.3)
RBC # BLD: 5.14 M/UL — SIGNIFICANT CHANGE UP (ref 4.2–5.8)
RBC # FLD: 11.4 % — SIGNIFICANT CHANGE UP (ref 10.3–14.5)
SODIUM SERPL-SCNC: 140 MMOL/L — SIGNIFICANT CHANGE UP (ref 135–145)
WBC # BLD: 11.9 K/UL — HIGH (ref 3.8–10.5)
WBC # FLD AUTO: 11.9 K/UL — HIGH (ref 3.8–10.5)

## 2019-05-11 PROCEDURE — 99232 SBSQ HOSP IP/OBS MODERATE 35: CPT

## 2019-05-11 PROCEDURE — 93010 ELECTROCARDIOGRAM REPORT: CPT

## 2019-05-11 RX ORDER — CHLORPROMAZINE HCL 10 MG
25 TABLET ORAL EVERY 8 HOURS
Refills: 0 | Status: DISCONTINUED | OUTPATIENT
Start: 2019-05-11 | End: 2019-05-12

## 2019-05-11 RX ORDER — MORPHINE SULFATE 50 MG/1
2 CAPSULE, EXTENDED RELEASE ORAL ONCE
Refills: 0 | Status: DISCONTINUED | OUTPATIENT
Start: 2019-05-11 | End: 2019-05-11

## 2019-05-11 RX ORDER — METHOCARBAMOL 500 MG/1
1000 TABLET, FILM COATED ORAL EVERY 8 HOURS
Refills: 0 | Status: DISCONTINUED | OUTPATIENT
Start: 2019-05-11 | End: 2019-05-13

## 2019-05-11 RX ORDER — CHLORPROMAZINE HCL 10 MG
25 TABLET ORAL ONCE
Refills: 0 | Status: COMPLETED | OUTPATIENT
Start: 2019-05-11 | End: 2019-05-11

## 2019-05-11 RX ORDER — PROCHLORPERAZINE MALEATE 5 MG
25 TABLET ORAL ONCE
Refills: 0 | Status: COMPLETED | OUTPATIENT
Start: 2019-05-11 | End: 2019-05-11

## 2019-05-11 RX ORDER — DOCUSATE SODIUM 100 MG
100 CAPSULE ORAL DAILY
Refills: 0 | Status: DISCONTINUED | OUTPATIENT
Start: 2019-05-11 | End: 2019-05-12

## 2019-05-11 RX ORDER — METOCLOPRAMIDE HCL 10 MG
10 TABLET ORAL ONCE
Refills: 0 | Status: COMPLETED | OUTPATIENT
Start: 2019-05-11 | End: 2019-05-11

## 2019-05-11 RX ORDER — SENNA PLUS 8.6 MG/1
2 TABLET ORAL AT BEDTIME
Refills: 0 | Status: DISCONTINUED | OUTPATIENT
Start: 2019-05-11 | End: 2019-05-12

## 2019-05-11 RX ORDER — METOCLOPRAMIDE HCL 10 MG
10 TABLET ORAL EVERY 6 HOURS
Refills: 0 | Status: DISCONTINUED | OUTPATIENT
Start: 2019-05-11 | End: 2019-05-12

## 2019-05-11 RX ORDER — ACETAMINOPHEN 500 MG
650 TABLET ORAL EVERY 6 HOURS
Refills: 0 | Status: DISCONTINUED | OUTPATIENT
Start: 2019-05-11 | End: 2019-05-12

## 2019-05-11 RX ADMIN — Medication 25 MILLIGRAM(S): at 05:30

## 2019-05-11 RX ADMIN — Medication 250 MILLIGRAM(S): at 05:30

## 2019-05-11 RX ADMIN — OXYMETAZOLINE HYDROCHLORIDE 1 SPRAY(S): 0.5 SPRAY NASAL at 17:03

## 2019-05-11 RX ADMIN — Medication 20 MILLIGRAM(S): at 13:45

## 2019-05-11 RX ADMIN — PIPERACILLIN AND TAZOBACTAM 200 GRAM(S): 4; .5 INJECTION, POWDER, LYOPHILIZED, FOR SOLUTION INTRAVENOUS at 11:37

## 2019-05-11 RX ADMIN — MORPHINE SULFATE 2 MILLIGRAM(S): 50 CAPSULE, EXTENDED RELEASE ORAL at 06:49

## 2019-05-11 RX ADMIN — Medication 100 MILLIGRAM(S): at 11:37

## 2019-05-11 RX ADMIN — SENNA PLUS 2 TABLET(S): 8.6 TABLET ORAL at 21:06

## 2019-05-11 RX ADMIN — GABAPENTIN 300 MILLIGRAM(S): 400 CAPSULE ORAL at 17:04

## 2019-05-11 RX ADMIN — Medication 25 MILLIGRAM(S): at 03:38

## 2019-05-11 RX ADMIN — Medication 20 MILLIGRAM(S): at 05:29

## 2019-05-11 RX ADMIN — PIPERACILLIN AND TAZOBACTAM 200 GRAM(S): 4; .5 INJECTION, POWDER, LYOPHILIZED, FOR SOLUTION INTRAVENOUS at 05:28

## 2019-05-11 RX ADMIN — METHOCARBAMOL 1000 MILLIGRAM(S): 500 TABLET, FILM COATED ORAL at 21:04

## 2019-05-11 RX ADMIN — Medication 25 MILLIGRAM(S): at 17:02

## 2019-05-11 RX ADMIN — Medication 102 MILLIGRAM(S): at 21:05

## 2019-05-11 RX ADMIN — OXYMETAZOLINE HYDROCHLORIDE 1 SPRAY(S): 0.5 SPRAY NASAL at 05:30

## 2019-05-11 RX ADMIN — ENOXAPARIN SODIUM 120 MILLIGRAM(S): 100 INJECTION SUBCUTANEOUS at 17:02

## 2019-05-11 RX ADMIN — Medication 250 MILLIGRAM(S): at 17:03

## 2019-05-11 RX ADMIN — GABAPENTIN 300 MILLIGRAM(S): 400 CAPSULE ORAL at 05:31

## 2019-05-11 RX ADMIN — Medication 20 MILLIGRAM(S): at 00:09

## 2019-05-11 RX ADMIN — Medication 25 MILLIGRAM(S): at 10:48

## 2019-05-11 RX ADMIN — MORPHINE SULFATE 2 MILLIGRAM(S): 50 CAPSULE, EXTENDED RELEASE ORAL at 07:22

## 2019-05-11 RX ADMIN — PIPERACILLIN AND TAZOBACTAM 200 GRAM(S): 4; .5 INJECTION, POWDER, LYOPHILIZED, FOR SOLUTION INTRAVENOUS at 17:02

## 2019-05-11 RX ADMIN — LISINOPRIL 10 MILLIGRAM(S): 2.5 TABLET ORAL at 07:26

## 2019-05-11 RX ADMIN — PIPERACILLIN AND TAZOBACTAM 200 GRAM(S): 4; .5 INJECTION, POWDER, LYOPHILIZED, FOR SOLUTION INTRAVENOUS at 00:09

## 2019-05-11 RX ADMIN — ATORVASTATIN CALCIUM 80 MILLIGRAM(S): 80 TABLET, FILM COATED ORAL at 21:04

## 2019-05-11 RX ADMIN — Medication 10 MILLIGRAM(S): at 04:59

## 2019-05-11 RX ADMIN — PIPERACILLIN AND TAZOBACTAM 200 GRAM(S): 4; .5 INJECTION, POWDER, LYOPHILIZED, FOR SOLUTION INTRAVENOUS at 23:37

## 2019-05-11 RX ADMIN — Medication 650 MILLIGRAM(S): at 15:59

## 2019-05-11 RX ADMIN — Medication 650 MILLIGRAM(S): at 17:04

## 2019-05-11 NOTE — PROGRESS NOTE ADULT - SUBJECTIVE AND OBJECTIVE BOX
Chief Complaint/Reason for Consult: CAD  INTERVAL HPI: tele reviewed, no events, noted for PEG tube placement monday  	  MEDICATIONS:  lisinopril 10 milliGRAM(s) Oral every 24 hours  metoprolol tartrate 25 milliGRAM(s) Oral two times a day    piperacillin/tazobactam IVPB. 4.5 Gram(s) IV Intermittent every 6 hours  piperacillin/tazobactam IVPB.      vancomycin  IVPB 2000 milliGRAM(s) IV Intermittent every 12 hours      baclofen 20 milliGRAM(s) Oral three times a day  gabapentin   Solution 300 milliGRAM(s) Oral two times a day    docusate sodium 100 milliGRAM(s) Oral daily  senna 2 Tablet(s) Oral at bedtime    atorvastatin 80 milliGRAM(s) Oral at bedtime    enoxaparin Injectable 120 milliGRAM(s) SubCutaneous two times a day  oxymetazoline 0.05% Nasal Spray 1 Spray(s) Both Nostrils two times a day  sodium chloride 0.65% Nasal 1 Spray(s) Both Nostrils four times a day PRN      REVIEW OF SYSTEMS:  [x] As per HPI  CONSTITUTIONAL: No fever, weight loss, or fatigue  RESPIRATORY: No cough, wheezing, chills or hemoptysis; No Shortness of Breath  CARDIOVASCULAR: No chest pain, palpitations, dizziness, or leg swelling  GASTROINTESTINAL: No abdominal or epigastric pain. No nausea, vomiting, or hematemesis; No diarrhea or constipation. No melena or hematochezia.  MUSCULOSKELETAL: No joint pain or swelling; No muscle, back, or extremity pain  [x] All others negative	  [ ] Unable to obtain    PHYSICAL EXAM:  T(C): 37.2 (05-11-19 @ 13:15), Max: 37.2 (05-11-19 @ 13:15)  HR: 80 (05-11-19 @ 13:38) (60 - 88)  BP: 158/83 (05-11-19 @ 13:38) (148/73 - 186/87)  RR: 16 (05-11-19 @ 13:38) (16 - 19)  SpO2: 95% (05-11-19 @ 13:38) (84% - 95%)  Wt(kg): --  I&O's Summary    10 May 2019 07:01  -  11 May 2019 07:00  --------------------------------------------------------  IN: 710 mL / OUT: 1900 mL / NET: -1190 mL    11 May 2019 07:01  -  11 May 2019 14:22  --------------------------------------------------------  IN: 0 mL / OUT: 200 mL / NET: -200 mL          Appearance: Normal	  HEENT:   Normal oral mucosa  Cardiovascular: Normal S1 S2, No JVD, No murmurs, No edema  Respiratory: Lungs clear to auscultation	  Gastrointestinal:  Soft, Non-tender, + BS	  Extremities: Normal range of motion, No clubbing, cyanosis or edema  Vascular: Peripheral pulses palpable 2+ bilaterally    TELEMETRY: 	    ECG:   	  RADIOLOGY:   CXR:  CT:  US:    CARDIAC TESTING:  Echocardiogram:  Catheterization:  Stress Test:      LABS:	 	    CARDIAC MARKERS:                                  15.2   11.90 )-----------( 249      ( 11 May 2019 05:52 )             46.1     05-11    140  |  100  |  23  ----------------------------<  140<H>  4.2   |  27  |  0.88    Ca    9.4      11 May 2019 05:52  Mg     2.3     05-11      proBNP:   Lipid Profile:   HgA1c:   TSH:     ASSESSMENT/PLAN: 	    # Pre op PEG -cardiac optimized  RCRI intermediate given CVA and CAD, however low risk necessary procedure  cardiac optimized, continue BB periop    # CVA - multiple territories, must consider cardiac etiology.  recommend continue telemetry monitoring  call [phone] for ILR placement  cannot do LISSY due to high aspiration risk  Anticoagulation per neurology    Thoracic aortic aneurysm without rupture.  Plan: Pt with hx of thoracic aortic aneurysm s/p repair in 2016 by Dr Stevenson  status post valve sparing aortic root replacement, ascending and hemiarch replacement on 12/19/16.   Echo 3/2019: concentric LVH, EF 60%, akinesis of basal inferior wall, no significant valvulopathies       Coronary artery disease involving native coronary artery of native heart without angina pectoris.  Plan: Pt with hx of CAD with NSTEMI, s/p stent to RCA in Oct 2017 s/p DAPT x 1 year.  - Per chart review, cardiac cath during admission for NSTEMI: LM normal, 30% pLAD stenosis, distal LCx luminal irregularities, mid 100% thrombotic RCA lesion with L to R collaterals. LVEF 55%, basal inferior hypokinesis, LVEDP 14 mmHG, 24 mm gradient across the aortic valve: mild to moderate aortic stenosis  - C/w Asa 81 mg daily  - C/w lisinopril 2.5 daily, uptitrate as tolerated  - C/w atorvastatin 80mg qHS.    Hypertension, unspecified type. Plan: - C/w home medications: lisinopril 2.5mg daily, toprol xl 50mg daily.

## 2019-05-11 NOTE — PROGRESS NOTE ADULT - SUBJECTIVE AND OBJECTIVE BOX
SUBJECTIVE / INTERVAL HPI: Patient seen and examined at bedside. Patient is still complaining of hiccuping this morning as well as abdominal pain associated with hiccups. Denies any nausea or vomiting. Is still having difficulty with excessive secretions. Did not tolerate attempt at feeding with NG tube, had increased nausea and abdominal pain.     VITAL SIGNS:  Vital Signs Last 24 Hrs  T(C): 37 (11 May 2019 09:11), Max: 37.1 (10 May 2019 16:37)  T(F): 98.6 (11 May 2019 09:11), Max: 98.7 (10 May 2019 16:37)  HR: 60 (11 May 2019 07:26) (60 - 88)  BP: 169/88 (11 May 2019 07:26) (156/88 - 186/87)  BP(mean): 122 (11 May 2019 07:26) (114 - 125)  RR: 18 (11 May 2019 07:26) (16 - 20)  SpO2: 92% (11 May 2019 07:26) (84% - 94%)    REVIEW OF SYSTEMS:  CONSTITUTIONAL: No weakness, fevers or chills.   EYES/ENT: No visual changes;  No vertigo or throat pain   NECK: No pain or stiffness  RESPIRATORY: No cough, wheezing, hemoptysis; No shortness of breath  CARDIOVASCULAR: No chest pain or palpitations  GASTROINTESTINAL: No abdominal or epigastric pain. No nausea, vomiting, or hematemesis; No diarrhea or constipation. No melena or hematochezia.  GENITOURINARY: No dysuria, frequency or hematuria  NEUROLOGICAL: No numbness or weakness  SKIN: No itching, burning, rashes, or lesions   All other review of systems is negative unless indicated above.    PHYSICAL EXAM:  General: WDWN obese male in moderate discomfort, hiccuping frequently  HEENT: NC/AT; PERRL, clear conjunctiva, NG tube in place  Neck: supple, no JVD  Cardiovascular: +S1/S2; RRR, no MRG  Respiratory: CTA b/l; no W/R/R  Gastrointestinal: Mildly distended but nontender in all 4 quadrants  Extremities: WWP; 2+ peripheral pulses; no edema   Neurological: AAOx3; no focal deficits    MEDICATIONS:  MEDICATIONS  (STANDING):  atorvastatin 80 milliGRAM(s) Oral at bedtime  baclofen 20 milliGRAM(s) Oral three times a day  docusate sodium 100 milliGRAM(s) Oral daily  enoxaparin Injectable 120 milliGRAM(s) SubCutaneous two times a day  gabapentin   Solution 300 milliGRAM(s) Oral two times a day  lisinopril 10 milliGRAM(s) Oral every 24 hours  metoprolol tartrate 25 milliGRAM(s) Oral two times a day  oxymetazoline 0.05% Nasal Spray 1 Spray(s) Both Nostrils two times a day  piperacillin/tazobactam IVPB. 4.5 Gram(s) IV Intermittent every 6 hours  piperacillin/tazobactam IVPB.      senna 2 Tablet(s) Oral at bedtime  vancomycin  IVPB 2000 milliGRAM(s) IV Intermittent every 12 hours    MEDICATIONS  (PRN):  sodium chloride 0.65% Nasal 1 Spray(s) Both Nostrils four times a day PRN Nasal Congestion      ALLERGIES:  Allergies    erythromycin (Unknown)  tetanus toxoids (Fever)    Intolerances        LABS:                        15.2   11.90 )-----------( 249      ( 11 May 2019 05:52 )             46.1     05-11    140  |  100  |  23  ----------------------------<  140<H>  4.2   |  27  |  0.88    Ca    9.4      11 May 2019 05:52  Mg     2.3     05-11      PTT - ( 09 May 2019 15:10 )  PTT:49.6 sec    CAPILLARY BLOOD GLUCOSE      POCT Blood Glucose.: 123 mg/dL (09 May 2019 11:11)      RADIOLOGY & ADDITIONAL TESTS: Reviewed.

## 2019-05-11 NOTE — PROGRESS NOTE ADULT - PROBLEM SELECTOR PLAN 1
Pt presented to [facility]) 5/1/19 with acute onset dizziness, gait ataxia (falling to R), s/p tPA on 5/1, found to have subacute/acute L frontal and R cerebellar strokes and R distal vertebral artery occlusion of unclear chronicity, transferred to [facility] for further management.  - Initial CTH negative, CTH 24 hours later reportedly negative for bleed.   - MRI showed subacute/acute L frontal and R cerebellar strokes.   - MRA negative  - TTE showed LVH and LVEF 60%  - Pt failed dysphagia screen, NGT placed for feeds and medications.  - Repeat CTH here  - S&S eval  - atorvastatin 80mg qHS  - hep gtt, monitor ptt until therapeutic x3 (goal 60-90)  - PT/OT  - Check hgb A1c, TSH, lipid panel  - Thorazine 25mg q8h for hiccups    #Occlusion of R vertebral artery  - Plan as above

## 2019-05-11 NOTE — PROGRESS NOTE ADULT - PROBLEM SELECTOR PLAN 3
Pt noted to have dysphagia, failed dysphagia screen, arrived with NGT from [facility]. Etiology likely 2/2 stroke  - Formal speech and swallow eval  - NGT placement confirmed by radiology here

## 2019-05-12 LAB
BASOPHILS # BLD AUTO: 0.04 K/UL — SIGNIFICANT CHANGE UP (ref 0–0.2)
BASOPHILS NFR BLD AUTO: 0.3 % — SIGNIFICANT CHANGE UP (ref 0–2)
BLD GP AB SCN SERPL QL: NEGATIVE — SIGNIFICANT CHANGE UP
EOSINOPHIL # BLD AUTO: 0.37 K/UL — SIGNIFICANT CHANGE UP (ref 0–0.5)
EOSINOPHIL NFR BLD AUTO: 3 % — SIGNIFICANT CHANGE UP (ref 0–6)
GLUCOSE BLDC GLUCOMTR-MCNC: 121 MG/DL — HIGH (ref 70–99)
HCT VFR BLD CALC: 45.5 % — SIGNIFICANT CHANGE UP (ref 39–50)
HGB BLD-MCNC: 15 G/DL — SIGNIFICANT CHANGE UP (ref 13–17)
IMM GRANULOCYTES NFR BLD AUTO: 0.3 % — SIGNIFICANT CHANGE UP (ref 0–1.5)
LYMPHOCYTES # BLD AUTO: 2.51 K/UL — SIGNIFICANT CHANGE UP (ref 1–3.3)
LYMPHOCYTES # BLD AUTO: 20.5 % — SIGNIFICANT CHANGE UP (ref 13–44)
MCHC RBC-ENTMCNC: 29.8 PG — SIGNIFICANT CHANGE UP (ref 27–34)
MCHC RBC-ENTMCNC: 33 GM/DL — SIGNIFICANT CHANGE UP (ref 32–36)
MCV RBC AUTO: 90.3 FL — SIGNIFICANT CHANGE UP (ref 80–100)
MONOCYTES # BLD AUTO: 1.17 K/UL — HIGH (ref 0–0.9)
MONOCYTES NFR BLD AUTO: 9.6 % — SIGNIFICANT CHANGE UP (ref 2–14)
NEUTROPHILS # BLD AUTO: 8.09 K/UL — HIGH (ref 1.8–7.4)
NEUTROPHILS NFR BLD AUTO: 66.3 % — SIGNIFICANT CHANGE UP (ref 43–77)
NRBC # BLD: 0 /100 WBCS — SIGNIFICANT CHANGE UP (ref 0–0)
PLATELET # BLD AUTO: 238 K/UL — SIGNIFICANT CHANGE UP (ref 150–400)
PROCALCITONIN SERPL-MCNC: 0.03 NG/ML — SIGNIFICANT CHANGE UP (ref 0.02–0.1)
RBC # BLD: 5.04 M/UL — SIGNIFICANT CHANGE UP (ref 4.2–5.8)
RBC # FLD: 11.3 % — SIGNIFICANT CHANGE UP (ref 10.3–14.5)
RH IG SCN BLD-IMP: NEGATIVE — SIGNIFICANT CHANGE UP
VANCOMYCIN TROUGH SERPL-MCNC: 10 UG/ML — SIGNIFICANT CHANGE UP (ref 10–20)
WBC # BLD: 12.22 K/UL — HIGH (ref 3.8–10.5)
WBC # FLD AUTO: 12.22 K/UL — HIGH (ref 3.8–10.5)

## 2019-05-12 PROCEDURE — 93010 ELECTROCARDIOGRAM REPORT: CPT

## 2019-05-12 PROCEDURE — 99233 SBSQ HOSP IP/OBS HIGH 50: CPT | Mod: 24

## 2019-05-12 PROCEDURE — 99232 SBSQ HOSP IP/OBS MODERATE 35: CPT

## 2019-05-12 RX ORDER — VANCOMYCIN HCL 1 G
1000 VIAL (EA) INTRAVENOUS EVERY 8 HOURS
Refills: 0 | Status: DISCONTINUED | OUTPATIENT
Start: 2019-05-12 | End: 2019-05-12

## 2019-05-12 RX ORDER — AMLODIPINE BESYLATE 2.5 MG/1
2.5 TABLET ORAL ONCE
Refills: 0 | Status: COMPLETED | OUTPATIENT
Start: 2019-05-12 | End: 2019-05-12

## 2019-05-12 RX ORDER — ACETAMINOPHEN 500 MG
650 TABLET ORAL EVERY 6 HOURS
Refills: 0 | Status: DISCONTINUED | OUTPATIENT
Start: 2019-05-12 | End: 2019-05-12

## 2019-05-12 RX ORDER — DIPHENHYDRAMINE HYDROCHLORIDE AND LIDOCAINE HYDROCHLORIDE AND ALUMINUM HYDROXIDE AND MAGNESIUM HYDRO
15 KIT
Refills: 0 | Status: DISCONTINUED | OUTPATIENT
Start: 2019-05-12 | End: 2019-05-13

## 2019-05-12 RX ORDER — METOCLOPRAMIDE HCL 10 MG
10 TABLET ORAL EVERY 6 HOURS
Refills: 0 | Status: DISCONTINUED | OUTPATIENT
Start: 2019-05-12 | End: 2019-05-13

## 2019-05-12 RX ORDER — ACETAMINOPHEN 500 MG
650 TABLET ORAL EVERY 6 HOURS
Refills: 0 | Status: DISCONTINUED | OUTPATIENT
Start: 2019-05-12 | End: 2019-05-13

## 2019-05-12 RX ORDER — VANCOMYCIN HCL 1 G
1 VIAL (EA) INTRAVENOUS EVERY 8 HOURS
Refills: 0 | Status: DISCONTINUED | OUTPATIENT
Start: 2019-05-12 | End: 2019-05-12

## 2019-05-12 RX ORDER — VANCOMYCIN HCL 1 G
1500 VIAL (EA) INTRAVENOUS EVERY 8 HOURS
Refills: 0 | Status: DISCONTINUED | OUTPATIENT
Start: 2019-05-12 | End: 2019-05-12

## 2019-05-12 RX ORDER — AMLODIPINE BESYLATE 2.5 MG/1
2.5 TABLET ORAL EVERY 24 HOURS
Refills: 0 | Status: DISCONTINUED | OUTPATIENT
Start: 2019-05-12 | End: 2019-05-12

## 2019-05-12 RX ORDER — AMLODIPINE BESYLATE 2.5 MG/1
5 TABLET ORAL DAILY
Refills: 0 | Status: DISCONTINUED | OUTPATIENT
Start: 2019-05-13 | End: 2019-05-13

## 2019-05-12 RX ORDER — CHLORPROMAZINE HCL 10 MG
10 TABLET ORAL EVERY 8 HOURS
Refills: 0 | Status: DISCONTINUED | OUTPATIENT
Start: 2019-05-12 | End: 2019-05-13

## 2019-05-12 RX ORDER — CHLORPROMAZINE HCL 10 MG
12.5 TABLET ORAL EVERY 8 HOURS
Refills: 0 | Status: DISCONTINUED | OUTPATIENT
Start: 2019-05-12 | End: 2019-05-12

## 2019-05-12 RX ADMIN — ATORVASTATIN CALCIUM 80 MILLIGRAM(S): 80 TABLET, FILM COATED ORAL at 21:36

## 2019-05-12 RX ADMIN — Medication 250 MILLIGRAM(S): at 06:14

## 2019-05-12 RX ADMIN — OXYMETAZOLINE HYDROCHLORIDE 1 SPRAY(S): 0.5 SPRAY NASAL at 06:13

## 2019-05-12 RX ADMIN — Medication 10 MILLIGRAM(S): at 18:16

## 2019-05-12 RX ADMIN — Medication 650 MILLIGRAM(S): at 18:15

## 2019-05-12 RX ADMIN — PIPERACILLIN AND TAZOBACTAM 200 GRAM(S): 4; .5 INJECTION, POWDER, LYOPHILIZED, FOR SOLUTION INTRAVENOUS at 11:17

## 2019-05-12 RX ADMIN — Medication 25 MILLIGRAM(S): at 05:34

## 2019-05-12 RX ADMIN — Medication 650 MILLIGRAM(S): at 11:17

## 2019-05-12 RX ADMIN — Medication 650 MILLIGRAM(S): at 03:10

## 2019-05-12 RX ADMIN — ENOXAPARIN SODIUM 120 MILLIGRAM(S): 100 INJECTION SUBCUTANEOUS at 05:35

## 2019-05-12 RX ADMIN — Medication 25 MILLIGRAM(S): at 18:19

## 2019-05-12 RX ADMIN — Medication 650 MILLIGRAM(S): at 18:44

## 2019-05-12 RX ADMIN — GABAPENTIN 300 MILLIGRAM(S): 400 CAPSULE ORAL at 05:41

## 2019-05-12 RX ADMIN — METHOCARBAMOL 1000 MILLIGRAM(S): 500 TABLET, FILM COATED ORAL at 21:37

## 2019-05-12 RX ADMIN — DIPHENHYDRAMINE HYDROCHLORIDE AND LIDOCAINE HYDROCHLORIDE AND ALUMINUM HYDROXIDE AND MAGNESIUM HYDRO 15 MILLILITER(S): KIT at 18:16

## 2019-05-12 RX ADMIN — Medication 650 MILLIGRAM(S): at 16:47

## 2019-05-12 RX ADMIN — LISINOPRIL 10 MILLIGRAM(S): 2.5 TABLET ORAL at 05:34

## 2019-05-12 RX ADMIN — AMLODIPINE BESYLATE 2.5 MILLIGRAM(S): 2.5 TABLET ORAL at 21:36

## 2019-05-12 RX ADMIN — Medication 10 MILLIGRAM(S): at 11:17

## 2019-05-12 RX ADMIN — PIPERACILLIN AND TAZOBACTAM 200 GRAM(S): 4; .5 INJECTION, POWDER, LYOPHILIZED, FOR SOLUTION INTRAVENOUS at 05:36

## 2019-05-12 RX ADMIN — Medication 10 MILLIGRAM(S): at 23:27

## 2019-05-12 RX ADMIN — METHOCARBAMOL 1000 MILLIGRAM(S): 500 TABLET, FILM COATED ORAL at 05:35

## 2019-05-12 RX ADMIN — Medication 650 MILLIGRAM(S): at 03:40

## 2019-05-12 RX ADMIN — GABAPENTIN 300 MILLIGRAM(S): 400 CAPSULE ORAL at 19:19

## 2019-05-12 RX ADMIN — METHOCARBAMOL 1000 MILLIGRAM(S): 500 TABLET, FILM COATED ORAL at 14:34

## 2019-05-12 RX ADMIN — AMLODIPINE BESYLATE 2.5 MILLIGRAM(S): 2.5 TABLET ORAL at 23:27

## 2019-05-12 NOTE — PROGRESS NOTE ADULT - SUBJECTIVE AND OBJECTIVE BOX
Chief Complaint/Reason for Consult: CAD  INTERVAL HPI: tele reviewed, no events, noted for PEG tube placement monday  	  MEDICATIONS:  lisinopril 10 milliGRAM(s) Oral every 24 hours  metoprolol tartrate 25 milliGRAM(s) Oral two times a day    piperacillin/tazobactam IVPB. 4.5 Gram(s) IV Intermittent every 6 hours  piperacillin/tazobactam IVPB.      vancomycin  IVPB 1500 milliGRAM(s) IV Intermittent every 8 hours      acetaminophen    Suspension .. 650 milliGRAM(s) Oral every 6 hours  chlorproMAZINE    IVPB 25 milliGRAM(s) IV Intermittent every 8 hours PRN  gabapentin   Solution 300 milliGRAM(s) Oral two times a day  methocarbamol 1000 milliGRAM(s) Oral every 8 hours  metoclopramide Injectable 10 milliGRAM(s) IV Push every 6 hours    docusate sodium 100 milliGRAM(s) Oral daily  senna 2 Tablet(s) Oral at bedtime    atorvastatin 80 milliGRAM(s) Oral at bedtime    FIRST- Mouthwash  BLM 15 milliLiter(s) Swish and Spit two times a day  sodium chloride 0.65% Nasal 1 Spray(s) Both Nostrils four times a day PRN      REVIEW OF SYSTEMS:  [x] As per HPI  CONSTITUTIONAL: No fever, weight loss, or fatigue  RESPIRATORY: No cough, wheezing, chills or hemoptysis; No Shortness of Breath  CARDIOVASCULAR: No chest pain, palpitations, dizziness, or leg swelling  GASTROINTESTINAL: No abdominal or epigastric pain. No nausea, vomiting, or hematemesis; No diarrhea or constipation. No melena or hematochezia.  MUSCULOSKELETAL: No joint pain or swelling; No muscle, back, or extremity pain  [x] All others negative	  [ ] Unable to obtain    PHYSICAL EXAM:  T(C): 37.4 (05-12-19 @ 09:28), Max: 37.4 (05-12-19 @ 09:28)  HR: 96 (05-12-19 @ 12:40) (64 - 96)  BP: 149/88 (05-12-19 @ 12:40) (130/61 - 192/65)  RR: 18 (05-12-19 @ 07:09) (16 - 18)  SpO2: 92% (05-12-19 @ 12:40) (90% - 95%)  Wt(kg): --  I&O's Summary    11 May 2019 07:01  -  12 May 2019 07:00  --------------------------------------------------------  IN: 600 mL / OUT: 1950 mL / NET: -1350 mL          Appearance: Normal	  HEENT:   Normal oral mucosa  Cardiovascular: Normal S1 S2, No JVD, No murmurs, No edema  Respiratory: Lungs clear to auscultation	  Gastrointestinal:  Soft, Non-tender, + BS	  Extremities: Normal range of motion, No clubbing, cyanosis or edema  Vascular: Peripheral pulses palpable 2+ bilaterally    TELEMETRY: 	    ECG:   	  RADIOLOGY:   CXR:  CT:  US:    CARDIAC TESTING:  Echocardiogram:  Catheterization:  Stress Test:      LABS:	 	    CARDIAC MARKERS:                                  15.0   12.22 )-----------( 238      ( 12 May 2019 05:58 )             45.5     05-11    140  |  100  |  23  ----------------------------<  140<H>  4.2   |  27  |  0.88    Ca    9.4      11 May 2019 05:52  Mg     2.3     05-11      proBNP:   Lipid Profile:   HgA1c:   TSH:     ASSESSMENT/PLAN: 	    # Pre op PEG -cardiac optimized  RCRI intermediate given CVA and CAD, however low risk necessary procedure  cardiac optimized, continue BB periop    # CVA - multiple territories, must consider cardiac etiology.  recommend continue telemetry monitoring  call [phone] for ILR placement  cannot do LISSY due to high aspiration risk  Anticoagulation per neurology    Thoracic aortic aneurysm without rupture.  Plan: Pt with hx of thoracic aortic aneurysm s/p repair in 2016 by Dr Stevenson  status post valve sparing aortic root replacement, ascending and hemiarch replacement on 12/19/16.   Echo 3/2019: concentric LVH, EF 60%, akinesis of basal inferior wall, no significant valvulopathies       Coronary artery disease involving native coronary artery of native heart without angina pectoris.  Plan: Pt with hx of CAD with NSTEMI, s/p stent to RCA in Oct 2017 s/p DAPT x 1 year.  - Per chart review, cardiac cath during admission for NSTEMI: LM normal, 30% pLAD stenosis, distal LCx luminal irregularities, mid 100% thrombotic RCA lesion with L to R collaterals. LVEF 55%, basal inferior hypokinesis, LVEDP 14 mmHG, 24 mm gradient across the aortic valve: mild to moderate aortic stenosis  - C/w Asa 81 mg daily  - C/w lisinopril 2.5 daily, uptitrate as tolerated  - C/w atorvastatin 80mg qHS.    Hypertension, unspecified type. Plan: - C/w home medications: lisinopril 2.5mg daily, toprol xl 50mg daily.

## 2019-05-12 NOTE — PROGRESS NOTE ADULT - SUBJECTIVE AND OBJECTIVE BOX
INTERVAL HPI/OVERNIGHT EVENTS:  Patient was seen and examined at bedside. As per nurse and patient, no o/n events, patient resting comfortably. Still intermittent hiccuping. Had loose BM. Patient denies: fever, chills, dizziness, weakness, HA, Changes in vision, CP, palpitations, SOB, cough, N/V/D/C, dysuria, changes in bowel movements, LE edema. ROS otherwise negative.    VITAL SIGNS:  T(F): 98.6 (05-12-19 @ 05:19)  HR: 64 (05-12-19 @ 07:09)  BP: 150/76 (05-12-19 @ 07:09)  RR: 18 (05-12-19 @ 07:09)  SpO2: 94% (05-12-19 @ 07:09)  Wt(kg): --    PHYSICAL EXAM:    Constitutional: WDWN, NAD, obese  HEENT: PERRL, EOMI, sclera non-icteric, neck supple, trachea midline, no masses, no JVD, MMM, good dentition, NGT in place   Respiratory: CTA b/l, good air entry b/l, no wheezing, no rhonchi, no rales, without accessory muscle use and no intercostal retractions  Cardiovascular: RRR, normal S1S2, no M/R/G  Gastrointestinal: soft, NTND, no masses palpable, BS normal  Extremities: Warm, well perfused, pulses equal bilateral upper and lower extremities, no edema, no clubbing  Neurological: AAOx3, CN Grossly intact  Skin: Normal temperature, warm, dry  Neurologic:  - Mental Status:  AAOx3; speech is fluent with intact naming, repetition, and comprehension  - Cranial Nerves II-XII:    II:  PERRLA; visual fields are full to confrontation  III, IV, VI:  EOMI, nystagmus on right gaze   V:  facial sensation is intact in the V1-V3 distribution bilaterally.  VII:  face is symmetric with normal eye closure and smile  VIII:  hearing is intact to finger rub  IX, X:  uvula is midline and soft palate rises symmetrically  XI:  head turning and shoulder shrug are intact bilaterally  XII:  tongue protrudes in the midline  - Motor:  strength is 5/5 throughout; normal muscle bulk and tone throughout; no pronator drift  - Reflexes:  2+ and symmetric at the biceps, triceps, brachioradialis, knees, and ankles;  plantar reflexes downgoing bilaterally  - Sensory:  intact to light touch, pin prick, vibration, and joint-position sense throughout  - Coordination:  finger-nose-finger and heel-knee-shin intact without dysmetria; rapid alternating hand movements intact, right drift  - Gait: deferred      MEDICATIONS  (STANDING):  acetaminophen    Suspension .. 650 milliGRAM(s) Oral every 6 hours  atorvastatin 80 milliGRAM(s) Oral at bedtime  docusate sodium 100 milliGRAM(s) Oral daily  gabapentin   Solution 300 milliGRAM(s) Oral two times a day  lisinopril 10 milliGRAM(s) Oral every 24 hours  methocarbamol 1000 milliGRAM(s) Oral every 8 hours  metoprolol tartrate 25 milliGRAM(s) Oral two times a day  piperacillin/tazobactam IVPB. 4.5 Gram(s) IV Intermittent every 6 hours  piperacillin/tazobactam IVPB.      senna 2 Tablet(s) Oral at bedtime    MEDICATIONS  (PRN):  chlorproMAZINE    IVPB 25 milliGRAM(s) IV Intermittent every 8 hours PRN hiccups  metoclopramide Injectable 10 milliGRAM(s) IV Push every 6 hours PRN hiccups refractory to Thorazine  sodium chloride 0.65% Nasal 1 Spray(s) Both Nostrils four times a day PRN Nasal Congestion      Allergies    erythromycin (Unknown)  tetanus toxoids (Fever)    Intolerances        LABS:                        15.0   12.22 )-----------( 238      ( 12 May 2019 05:58 )             45.5     05-11    140  |  100  |  23  ----------------------------<  140<H>  4.2   |  27  |  0.88    Ca    9.4      11 May 2019 05:52  Mg     2.3     05-11            RADIOLOGY & ADDITIONAL TESTS:  Reviewed

## 2019-05-12 NOTE — PROGRESS NOTE ADULT - PROBLEM SELECTOR PLAN 1
Pt presented to [facility]) 5/1/19 with acute onset dizziness, gait ataxia (falling to R), s/p tPA on 5/1, found to have subacute/acute L frontal and R cerebellar strokes and R distal vertebral artery occlusion of unclear chronicity, transferred to [facility] for further management.  - Initial CTH negative, CTH 24 hours later reportedly negative for bleed.   - MRI showed subacute/acute L frontal and R cerebellar strokes.   - MRA negative  - TTE showed LVH and LVEF 60%  - Pt failed dysphagia screen, NGT placed for feeds and medications.  - Repeat CTH here  - S&S eval  - atorvastatin 80mg qHS  - hep gtt, monitor ptt until therapeutic x3 (goal 60-90)  - PT/OT  - Check hgb A1c, TSH, lipid panel  - Thorazine 25mg q8h for hiccups    #Occlusion of R vertebral artery  - Plan as above

## 2019-05-12 NOTE — PROGRESS NOTE ADULT - PROBLEM SELECTOR PLAN 3
Pt noted to have dysphagia, failed dysphagia screen, arrived with NGT from [facility]. Etiology likely 2/2 stroke  - FEEs failed x2  - NGT placement confirmed by radiology here  -Planninig for PEG w/ GS MOnday

## 2019-05-13 LAB
ANION GAP SERPL CALC-SCNC: 12 MMOL/L — SIGNIFICANT CHANGE UP (ref 5–17)
APTT BLD: 30.6 SEC — SIGNIFICANT CHANGE UP (ref 27.5–36.3)
BUN SERPL-MCNC: 25 MG/DL — HIGH (ref 7–23)
CALCIUM SERPL-MCNC: 9.3 MG/DL — SIGNIFICANT CHANGE UP (ref 8.4–10.5)
CHLORIDE SERPL-SCNC: 100 MMOL/L — SIGNIFICANT CHANGE UP (ref 96–108)
CO2 SERPL-SCNC: 26 MMOL/L — SIGNIFICANT CHANGE UP (ref 22–31)
CREAT SERPL-MCNC: 0.84 MG/DL — SIGNIFICANT CHANGE UP (ref 0.5–1.3)
GLUCOSE SERPL-MCNC: 134 MG/DL — HIGH (ref 70–99)
HCT VFR BLD CALC: 44.6 % — SIGNIFICANT CHANGE UP (ref 39–50)
HGB BLD-MCNC: 14.8 G/DL — SIGNIFICANT CHANGE UP (ref 13–17)
INR BLD: 1.34 — HIGH (ref 0.88–1.16)
MAGNESIUM SERPL-MCNC: 2.3 MG/DL — SIGNIFICANT CHANGE UP (ref 1.6–2.6)
MCHC RBC-ENTMCNC: 29.4 PG — SIGNIFICANT CHANGE UP (ref 27–34)
MCHC RBC-ENTMCNC: 33.2 GM/DL — SIGNIFICANT CHANGE UP (ref 32–36)
MCV RBC AUTO: 88.7 FL — SIGNIFICANT CHANGE UP (ref 80–100)
NRBC # BLD: 0 /100 WBCS — SIGNIFICANT CHANGE UP (ref 0–0)
PLATELET # BLD AUTO: 282 K/UL — SIGNIFICANT CHANGE UP (ref 150–400)
POTASSIUM SERPL-MCNC: 4 MMOL/L — SIGNIFICANT CHANGE UP (ref 3.5–5.3)
POTASSIUM SERPL-SCNC: 4 MMOL/L — SIGNIFICANT CHANGE UP (ref 3.5–5.3)
PROTHROM AB SERPL-ACNC: 15.3 SEC — HIGH (ref 10–12.9)
RBC # BLD: 5.03 M/UL — SIGNIFICANT CHANGE UP (ref 4.2–5.8)
RBC # FLD: 11.4 % — SIGNIFICANT CHANGE UP (ref 10.3–14.5)
SODIUM SERPL-SCNC: 138 MMOL/L — SIGNIFICANT CHANGE UP (ref 135–145)
WBC # BLD: 13.26 K/UL — HIGH (ref 3.8–10.5)
WBC # FLD AUTO: 13.26 K/UL — HIGH (ref 3.8–10.5)

## 2019-05-13 PROCEDURE — 99233 SBSQ HOSP IP/OBS HIGH 50: CPT

## 2019-05-13 PROCEDURE — 99221 1ST HOSP IP/OBS SF/LOW 40: CPT | Mod: GC,25

## 2019-05-13 PROCEDURE — 43246 EGD PLACE GASTROSTOMY TUBE: CPT | Mod: GC

## 2019-05-13 RX ORDER — METOCLOPRAMIDE HCL 10 MG
10 TABLET ORAL EVERY 6 HOURS
Refills: 0 | Status: DISCONTINUED | OUTPATIENT
Start: 2019-05-13 | End: 2019-05-15

## 2019-05-13 RX ORDER — AMLODIPINE BESYLATE 2.5 MG/1
5 TABLET ORAL DAILY
Refills: 0 | Status: DISCONTINUED | OUTPATIENT
Start: 2019-05-13 | End: 2019-05-14

## 2019-05-13 RX ORDER — ACETAMINOPHEN 500 MG
1000 TABLET ORAL ONCE
Refills: 0 | Status: COMPLETED | OUTPATIENT
Start: 2019-05-13 | End: 2019-05-13

## 2019-05-13 RX ORDER — ACETAMINOPHEN 500 MG
650 TABLET ORAL EVERY 6 HOURS
Refills: 0 | Status: DISCONTINUED | OUTPATIENT
Start: 2019-05-13 | End: 2019-05-21

## 2019-05-13 RX ORDER — SODIUM CHLORIDE 0.65 %
1 AEROSOL, SPRAY (ML) NASAL
Refills: 0 | Status: DISCONTINUED | OUTPATIENT
Start: 2019-05-13 | End: 2019-05-21

## 2019-05-13 RX ORDER — CIPROFLOXACIN HCL 0.3 %
1 DROPS OPHTHALMIC (EYE) EVERY 4 HOURS
Refills: 0 | Status: DISCONTINUED | OUTPATIENT
Start: 2019-05-13 | End: 2019-05-13

## 2019-05-13 RX ORDER — LANOLIN ALCOHOL/MO/W.PET/CERES
5 CREAM (GRAM) TOPICAL ONCE
Refills: 0 | Status: COMPLETED | OUTPATIENT
Start: 2019-05-13 | End: 2019-05-13

## 2019-05-13 RX ORDER — HYDRALAZINE HCL 50 MG
5 TABLET ORAL ONCE
Refills: 0 | Status: COMPLETED | OUTPATIENT
Start: 2019-05-13 | End: 2019-05-13

## 2019-05-13 RX ORDER — ASPIRIN/CALCIUM CARB/MAGNESIUM 324 MG
300 TABLET ORAL ONCE
Refills: 0 | Status: COMPLETED | OUTPATIENT
Start: 2019-05-13 | End: 2019-05-13

## 2019-05-13 RX ORDER — SODIUM CHLORIDE 9 MG/ML
1000 INJECTION INTRAMUSCULAR; INTRAVENOUS; SUBCUTANEOUS
Refills: 0 | Status: DISCONTINUED | OUTPATIENT
Start: 2019-05-13 | End: 2019-05-14

## 2019-05-13 RX ORDER — LISINOPRIL 2.5 MG/1
10 TABLET ORAL EVERY 24 HOURS
Refills: 0 | Status: DISCONTINUED | OUTPATIENT
Start: 2019-05-13 | End: 2019-05-14

## 2019-05-13 RX ORDER — LANOLIN ALCOHOL/MO/W.PET/CERES
5 CREAM (GRAM) TOPICAL AT BEDTIME
Refills: 0 | Status: DISCONTINUED | OUTPATIENT
Start: 2019-05-13 | End: 2019-05-13

## 2019-05-13 RX ORDER — KETOROLAC TROMETHAMINE 30 MG/ML
15 SYRINGE (ML) INJECTION ONCE
Refills: 0 | Status: DISCONTINUED | OUTPATIENT
Start: 2019-05-13 | End: 2019-05-13

## 2019-05-13 RX ORDER — LANOLIN ALCOHOL/MO/W.PET/CERES
5 CREAM (GRAM) TOPICAL AT BEDTIME
Refills: 0 | Status: DISCONTINUED | OUTPATIENT
Start: 2019-05-13 | End: 2019-05-21

## 2019-05-13 RX ORDER — CHLORPROMAZINE HCL 10 MG
10 TABLET ORAL EVERY 8 HOURS
Refills: 0 | Status: DISCONTINUED | OUTPATIENT
Start: 2019-05-13 | End: 2019-05-21

## 2019-05-13 RX ORDER — DIPHENHYDRAMINE HYDROCHLORIDE AND LIDOCAINE HYDROCHLORIDE AND ALUMINUM HYDROXIDE AND MAGNESIUM HYDRO
15 KIT
Refills: 0 | Status: DISCONTINUED | OUTPATIENT
Start: 2019-05-13 | End: 2019-05-21

## 2019-05-13 RX ORDER — SODIUM CHLORIDE 9 MG/ML
1000 INJECTION, SOLUTION INTRAVENOUS
Refills: 0 | Status: DISCONTINUED | OUTPATIENT
Start: 2019-05-13 | End: 2019-05-13

## 2019-05-13 RX ORDER — METHOCARBAMOL 500 MG/1
1000 TABLET, FILM COATED ORAL EVERY 8 HOURS
Refills: 0 | Status: DISCONTINUED | OUTPATIENT
Start: 2019-05-13 | End: 2019-05-15

## 2019-05-13 RX ORDER — GABAPENTIN 400 MG/1
300 CAPSULE ORAL
Refills: 0 | Status: DISCONTINUED | OUTPATIENT
Start: 2019-05-13 | End: 2019-05-21

## 2019-05-13 RX ORDER — ATORVASTATIN CALCIUM 80 MG/1
80 TABLET, FILM COATED ORAL AT BEDTIME
Refills: 0 | Status: DISCONTINUED | OUTPATIENT
Start: 2019-05-13 | End: 2019-05-21

## 2019-05-13 RX ADMIN — GABAPENTIN 300 MILLIGRAM(S): 400 CAPSULE ORAL at 06:09

## 2019-05-13 RX ADMIN — Medication 5 MILLIGRAM(S): at 02:30

## 2019-05-13 RX ADMIN — SODIUM CHLORIDE 120 MILLILITER(S): 9 INJECTION INTRAMUSCULAR; INTRAVENOUS; SUBCUTANEOUS at 21:51

## 2019-05-13 RX ADMIN — Medication 5 MILLIGRAM(S): at 03:24

## 2019-05-13 RX ADMIN — SODIUM CHLORIDE 120 MILLILITER(S): 9 INJECTION INTRAMUSCULAR; INTRAVENOUS; SUBCUTANEOUS at 13:51

## 2019-05-13 RX ADMIN — Medication 25 MILLIGRAM(S): at 05:50

## 2019-05-13 RX ADMIN — Medication 10 MILLIGRAM(S): at 01:24

## 2019-05-13 RX ADMIN — METHOCARBAMOL 1000 MILLIGRAM(S): 500 TABLET, FILM COATED ORAL at 16:23

## 2019-05-13 RX ADMIN — Medication 5 MILLIGRAM(S): at 22:13

## 2019-05-13 RX ADMIN — Medication 300 MILLIGRAM(S): at 09:54

## 2019-05-13 RX ADMIN — Medication 10 MILLIGRAM(S): at 05:52

## 2019-05-13 RX ADMIN — DIPHENHYDRAMINE HYDROCHLORIDE AND LIDOCAINE HYDROCHLORIDE AND ALUMINUM HYDROXIDE AND MAGNESIUM HYDRO 15 MILLILITER(S): KIT at 17:23

## 2019-05-13 RX ADMIN — Medication 1 DROP(S): at 07:15

## 2019-05-13 RX ADMIN — AMLODIPINE BESYLATE 5 MILLIGRAM(S): 2.5 TABLET ORAL at 16:25

## 2019-05-13 RX ADMIN — Medication 400 MILLIGRAM(S): at 13:53

## 2019-05-13 RX ADMIN — Medication 15 MILLIGRAM(S): at 06:10

## 2019-05-13 RX ADMIN — Medication 650 MILLIGRAM(S): at 19:00

## 2019-05-13 RX ADMIN — METHOCARBAMOL 1000 MILLIGRAM(S): 500 TABLET, FILM COATED ORAL at 05:50

## 2019-05-13 RX ADMIN — GABAPENTIN 300 MILLIGRAM(S): 400 CAPSULE ORAL at 21:21

## 2019-05-13 RX ADMIN — METHOCARBAMOL 1000 MILLIGRAM(S): 500 TABLET, FILM COATED ORAL at 21:11

## 2019-05-13 RX ADMIN — Medication 15 MILLIGRAM(S): at 05:50

## 2019-05-13 RX ADMIN — Medication 1 DROP(S): at 03:24

## 2019-05-13 RX ADMIN — ATORVASTATIN CALCIUM 80 MILLIGRAM(S): 80 TABLET, FILM COATED ORAL at 21:11

## 2019-05-13 RX ADMIN — LISINOPRIL 10 MILLIGRAM(S): 2.5 TABLET ORAL at 05:52

## 2019-05-13 RX ADMIN — AMLODIPINE BESYLATE 5 MILLIGRAM(S): 2.5 TABLET ORAL at 05:50

## 2019-05-13 RX ADMIN — Medication 650 MILLIGRAM(S): at 18:46

## 2019-05-13 RX ADMIN — DIPHENHYDRAMINE HYDROCHLORIDE AND LIDOCAINE HYDROCHLORIDE AND ALUMINUM HYDROXIDE AND MAGNESIUM HYDRO 15 MILLILITER(S): KIT at 05:50

## 2019-05-13 RX ADMIN — Medication 10 MILLIGRAM(S): at 18:46

## 2019-05-13 RX ADMIN — Medication 5 MILLIGRAM(S): at 21:11

## 2019-05-13 NOTE — PROGRESS NOTE ADULT - SUBJECTIVE AND OBJECTIVE BOX
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 Following history obtained from Dr. Montez, Jan [phone] who cared for pt at [place]: TPA pushed at 4:11 am on 5/1/19. Initial CTH negative. CTP showing small focal area of decreased perfusion in L frontal area. CTA H/N showed R distal vertebral artery occlusion of unclear chronicity. CTH 24 hours later reportedly negative for bleed. Pt started on hep gtt, asa 81, lipitor, losartan, lopressor.  MRI showed subacute/acute L frontal and R cerebellar strokes. MRA negative. TTE showed LVH and LVEF 60%. Pt failed dysphagia screen, NGT placed for feeds and medications. Pt ambulated with PT. Pt noted to have hiccups for which he was on baclofen (per patient without relief). Of note pt has been suctioning his own secretions and tubing noted to have blood tinged secretions. (08 May 2019 20:12)                            14.8   13.26 )-----------( 282      ( 13 May 2019 07:19 )             44.6       05-13    138  |  100  |  25<H>  ----------------------------<  134<H>  4.0   |  26  |  0.84    Ca    9.3      13 May 2019 07:19  Mg     2.3     05-13      Vital Signs Last 24 Hrs  T(C): 36.9 (13 May 2019 13:40), Max: 36.9 (13 May 2019 01:00)  T(F): 98.4 (13 May 2019 13:40), Max: 98.4 (13 May 2019 01:00)  HR: 78 (13 May 2019 11:29) (64 - 82)  BP: 142/70 (13 May 2019 11:14) (142/70 - 210/100)  BP(mean): 97 (13 May 2019 11:14) (88 - 142)  RR: 18 (13 May 2019 11:29) (7 - 19)  SpO2: 96% (13 May 2019 11:29) (92% - 99%)    MEDICATIONS  (STANDING):  amLODIPine   Tablet 5 milliGRAM(s) Oral daily  atorvastatin 80 milliGRAM(s) Oral at bedtime  FIRST- Mouthwash  BLM 15 milliLiter(s) Swish and Spit two times a day  gabapentin   Solution 300 milliGRAM(s) Oral two times a day  lisinopril 10 milliGRAM(s) Oral every 24 hours  methocarbamol 1000 milliGRAM(s) Oral every 8 hours  metoclopramide Injectable 10 milliGRAM(s) IV Push every 6 hours  sodium chloride 0.9%. 1000 milliLiter(s) (120 mL/Hr) IV Continuous <Continuous>    MEDICATIONS  (PRN):  acetaminophen    Suspension .. 650 milliGRAM(s) Oral every 6 hours PRN Mild Pain (1 - 3), Moderate Pain (4 - 6)  chlorproMAZINE    Tablet 10 milliGRAM(s) Oral every 8 hours PRN Hiccups  sodium chloride 0.65% Nasal 1 Spray(s) Both Nostrils four times a day PRN Nasal Congestion    Currently Undergoing Physical Therapy at bedside.    Functional Status Assessment: on 5/12/2019    Therapeutic Interventions      Bed Mobility  Bed Mobility Training Rehab Potential: good, to achieve stated therapy goals  Bed Mobility Training Symptoms Noted During/After Treatment: none  Bed Mobility Training Rolling/Turning: contact guard;  1 person assist;  bed rails;  elevated HOB  Bed Mobility Training Scooting: contact guard;  1 person assist  Bed Mobility Training Supine-to-Sit: contact guard;  1 person assist;  bed rails;  elevated HOB  Bed Mobility Training Limitations: impaired coordination;  impaired motor control;  decreased ability to use legs for bridging/pushing;  decreased ability to use arms for pushing/pulling;  impaired ability to control trunk for mobility    Sit-Stand Transfer Training  Sit-to-Stand Transfer Training Rehab Potential: good, to achieve stated therapy goals  Transfer Training Sit-to-Stand Transfer: moderate assist (50% patient effort);  2 person assist;  full weight-bearing   bilateral HHA  Transfer Training Stand-to-Sit Transfer: moderate assist (50% patient effort);  2 person assist;  full weight-bearing   bilateral HHA;  right lateral lean increased to max assist with fatigue  Sit-to-Stand Transfer Training Transfer Safety Analysis: decreased balance;  decreased step length;  decreased weight-shifting ability;  impaired balance;  impaired coordination;  impaired motor control;  decreased strength    Gait Training  Gait Training Rehab Potential: good, to achieve stated therapy goals  Gait Training Symptoms Noted During/After Treatment: dizziness;  4-5/10 intensity for dizziness  Gait Training: maximum assist (25% patient effort);  moderate assist (50% patient effort);  2 person assist;  right lateral lean through RLE Single limb stance;  full weight-bearing   bilateral HHA;  5+8 with seated rest  Gait Analysis: 3-point gait   decreased imelda;  ataxic;  decreased step length;  impaired postural control;  impaired motor control;  impaired coordination;  impaired balance;  5+ 8 feet;  bilateral HHA    Therapeutic Exercise  Therapeutic Exercise Detail: Standing: heel-toe raises x 10 with right lateral lean through later half of set, standing marching with better foot placement when targeting next to lateral border of therapists foot. Standing in front of mirror in bathroom, ipsilateral reaching toward mirror target x 5 bilateral, reaching acrossed midline to faucet target x 3 bilateral with mod/max assist. Encouraged to complete Home exercise program of seated toe taps/tracing squares, LAQ, ankle pumps/circles and seated marching outside of PT sessions.     Neuromuscular Re-education  Neuromuscular Re-education Detail: FTN with increased time and hypometria RUE>LUE, HTS WNL bilateral, toe-heel taps with clumsiness RLE, WNL LLE, Tracing squares CW/CCW RLE with increased time RLE, WNL LLE. finger opposition WNL LUE, noted tremulousness RUE, patient reporting right face feeling "fat" explaining it feels "full like pins and needles," sensation otherwise intact through all limbs, tongue midline no noted facial assymetry, patient reporting diplopia has improved from last PT visit stating images are becoming more over lapped.     Balance Skills Training  Balance Skills Training Rehab Potential: good, to achieve stated therapy goals  Balance Skills Training Symptoms Noted During/After Treatment: dizziness  Balance Skills Training Sitting Balance: Static: Fair +  Balance Skills Sitting Balance: Dynamic: fair balance  Balance Skills Sit-to-Stand Balance: poor balance  Balance Skills Standing Balance: Static: poor balance  Balance Skills Standing Balance: Dynamic: poor balance  Balance Skills Systems Impairment Contributing to Balance Disturbance: neuromuscular  Balance Skills Identified Impairments Contributing to Balance Disturbance: impaired sensory feedback;  impaired postural control;  impaired motor control;  impaired coordination          PM&R Impression: as above    Disposition Plan Recommendations: acute rehab placement

## 2019-05-13 NOTE — PROGRESS NOTE ADULT - SUBJECTIVE AND OBJECTIVE BOX
PRE OPERATIVE NOTE    Pre-op Diagnosis: CVA  Procedure: PEG tube placement  Surgeon:     Consent : done                          15.0   12.22 )-----------( 238      ( 12 May 2019 05:58 )             45.5     Type & Screen: x1  CXR: yes   EKG: yes

## 2019-05-13 NOTE — BRIEF OPERATIVE NOTE - OPERATION/FINDINGS
EGD demonstrating mild gastritis at fundus, no abnormalities in proximal duodenum. PEG placed, skin bumper @ 4cm.

## 2019-05-13 NOTE — PROGRESS NOTE ADULT - SUBJECTIVE AND OBJECTIVE BOX
INTERVAL HPI/OVERNIGHT EVENTS:  Patient was seen and examined at bedside. Pt was HTN 180s early in shift (including on manual BP check), ordered for Amlodipine 2.5 mg daily. Persistently hypertensive to 200s (likely 2/2 anxiety) given hydral 5mg ivp x1, toradol 15 ivp x1 for lateral CP/back pain from hiccups. No complaints at this time. Patient denies: fever, chills, dizziness, weakness, HA, Changes in vision, CP, palpitations, SOB, cough, N/V/D/C, dysuria, changes in bowel movements, LE edema. ROS otherwise negative.    VITAL SIGNS:  T(F): 98.1 (05-13-19 @ 05:00)  HR: 68 (05-13-19 @ 07:40)  BP: 180/79 (05-13-19 @ 07:40)  RR: 18 (05-13-19 @ 07:40)  SpO2: 94% (05-13-19 @ 07:40)  Wt(kg): --    PHYSICAL EXAM:    Constitutional: WDWN, NAD, obese  HEENT: PERRL, EOMI, sclera non-icteric, neck supple, trachea midline, no masses, no JVD, MMM, good dentition, NGT in place   Respiratory: CTA b/l, good air entry b/l, no wheezing, no rhonchi, no rales, without accessory muscle use and no intercostal retractions  Cardiovascular: RRR, normal S1S2, no M/R/G  Gastrointestinal: soft, NTND, no masses palpable, BS normal  Extremities: Warm, well perfused, pulses equal bilateral upper and lower extremities, no edema, no clubbing  Neurological: AAOx3, CN Grossly intact  Skin: Normal temperature, warm, dry  Neurologic:  - Mental Status:  AAOx3; speech is fluent with intact naming, repetition, and comprehension  - Cranial Nerves II-XII:    II:  PERRLA; visual fields are full to confrontation  III, IV, VI:  EOMI, nystagmus on right gaze   V:  facial sensation is intact in the V1-V3 distribution bilaterally.  VII:  face is symmetric with normal eye closure and smile  VIII:  hearing is intact to finger rub  IX, X:  uvula is midline and soft palate rises symmetrically  XI:  head turning and shoulder shrug are intact bilaterally  XII:  tongue protrudes in the midline  - Motor:  strength is 5/5 throughout; normal muscle bulk and tone throughout; no pronator drift  - Reflexes:  2+ and symmetric at the biceps, triceps, brachioradialis, knees, and ankles;  plantar reflexes downgoing bilaterally  - Sensory:  intact to light touch, pin prick, vibration, and joint-position sense throughout  - Coordination:  finger-nose-finger and heel-knee-shin intact without dysmetria; rapid alternating hand movements intact, right drift  - Gait: deferred      MEDICATIONS  (STANDING):  amLODIPine   Tablet 5 milliGRAM(s) Oral daily  atorvastatin 80 milliGRAM(s) Oral at bedtime  ciprofloxacin  0.3% Ophthalmic Solution 1 Drop(s) Right EYE every 4 hours  FIRST- Mouthwash  BLM 15 milliLiter(s) Swish and Spit two times a day  gabapentin   Solution 300 milliGRAM(s) Oral two times a day  lactated ringers. 1000 milliLiter(s) (120 mL/Hr) IV Continuous <Continuous>  lisinopril 10 milliGRAM(s) Oral every 24 hours  methocarbamol 1000 milliGRAM(s) Oral every 8 hours  metoclopramide Injectable 10 milliGRAM(s) IV Push every 6 hours  metoprolol tartrate 25 milliGRAM(s) Oral two times a day    MEDICATIONS  (PRN):  acetaminophen    Suspension .. 650 milliGRAM(s) Oral every 6 hours PRN Mild Pain (1 - 3), Moderate Pain (4 - 6)  chlorproMAZINE    Tablet 10 milliGRAM(s) Oral every 8 hours PRN Hiccups  sodium chloride 0.65% Nasal 1 Spray(s) Both Nostrils four times a day PRN Nasal Congestion      Allergies    erythromycin (Unknown)  tetanus toxoids (Fever)    Intolerances        LABS:                        14.8   13.26 )-----------( 282      ( 13 May 2019 07:19 )             44.6     05-13    138  |  100  |  25<H>  ----------------------------<  134<H>  4.0   |  26  |  0.84    Ca    9.3      13 May 2019 07:19  Mg     2.3     05-13      PT/INR - ( 13 May 2019 07:19 )   PT: 15.3 sec;   INR: 1.34          PTT - ( 13 May 2019 07:19 )  PTT:30.6 sec      RADIOLOGY & ADDITIONAL TESTS:  Reviewed

## 2019-05-13 NOTE — PROGRESS NOTE ADULT - PROBLEM SELECTOR PLAN 3
Pt noted to have dysphagia, failed dysphagia screen, arrived with NGT from [facility] [place]. Etiology likely 2/2 stroke  - FEEs failed x2  - NGT placement confirmed by radiology here  -Planninig for PEG w/ GS MOnday

## 2019-05-13 NOTE — BRIEF OPERATIVE NOTE - NSICDXBRIEFPREOP_GEN_ALL_CORE_FT
PRE-OP DIAGNOSIS:  Dysphagia 13-May-2019 10:29:02  Gianna Dolan  Stroke 13-May-2019 10:28:56  Gianna Dolan

## 2019-05-13 NOTE — PROGRESS NOTE ADULT - PROBLEM SELECTOR PLAN 1
Pt presented to [facility]) 5/1/19 with acute onset dizziness, gait ataxia (falling to R), s/p tPA on 5/1, found to have subacute/acute L frontal and R cerebellar strokes and R distal vertebral artery occlusion of unclear chronicity, transferred to [facility] for further management.  - Initial CTH negative, CTH 24 hours later reportedly negative for bleed.   - MRI showed subacute/acute L frontal and R cerebellar strokes.   - MRA negative  - TTE showed LVH and LVEF 60%  - Pt failed dysphagia screen, NGT placed for feeds and medications.  - Repeat CTH here  - S&S eval  - atorvastatin 80mg qHS  - hep gtt, monitor ptt until therapeutic x3 (goal 60-90)  - PT/OT  - Check hgb A1c, TSH, lipid panel  - Thorazine 25mg q8h for hiccups    #Occlusion of R vertebral artery  - Plan as above

## 2019-05-13 NOTE — PROGRESS NOTE ADULT - SUBJECTIVE AND OBJECTIVE BOX
POST-OP CHECK:    Procedure:  PEG placement  Surgeon: Joyce Pickard    S: Reports minimal pain localized to surgical site, adequately controlled on current pain meds. Denies N, V, CP, SOB, & calf tenderness.     O:   Vital Signs Last 24 Hrs  T(C): 36.9 (13 May 2019 13:40), Max: 36.9 (13 May 2019 01:00)  T(F): 98.4 (13 May 2019 13:40), Max: 98.4 (13 May 2019 01:00)  HR: 78 (13 May 2019 11:29) (64 - 82)  BP: 142/70 (13 May 2019 11:14) (142/70 - 210/100)  BP(mean): 97 (13 May 2019 11:14) (88 - 142)  RR: 18 (13 May 2019 11:29) (7 - 19)  SpO2: 96% (13 May 2019 11:29) (92% - 99%)    Gen: NAD  CV: RRR.   Resp: CTAB; no increased WOB.   Abd: Soft, ND, NT.  Wound: PEG site w/ dressing C/D/I; no signs of bleeding or discharge.      A/P: 54yMale s/p above procedure.  -Medications today  -Start trickle feeds tomorrow (Tuesday) at 11am  -Rest of care per primary team

## 2019-05-13 NOTE — BRIEF OPERATIVE NOTE - NSICDXBRIEFPOSTOP_GEN_ALL_CORE_FT
POST-OP DIAGNOSIS:  Dysphagia 13-May-2019 10:29:15  Gianna Dolan  Stroke 13-May-2019 10:29:10  Gianna Dolan

## 2019-05-14 LAB
ANION GAP SERPL CALC-SCNC: 9 MMOL/L — SIGNIFICANT CHANGE UP (ref 5–17)
BUN SERPL-MCNC: 28 MG/DL — HIGH (ref 7–23)
CALCIUM SERPL-MCNC: 8.9 MG/DL — SIGNIFICANT CHANGE UP (ref 8.4–10.5)
CHLORIDE SERPL-SCNC: 106 MMOL/L — SIGNIFICANT CHANGE UP (ref 96–108)
CO2 SERPL-SCNC: 26 MMOL/L — SIGNIFICANT CHANGE UP (ref 22–31)
CREAT SERPL-MCNC: 0.82 MG/DL — SIGNIFICANT CHANGE UP (ref 0.5–1.3)
CULTURE RESULTS: SIGNIFICANT CHANGE UP
CULTURE RESULTS: SIGNIFICANT CHANGE UP
GLUCOSE SERPL-MCNC: 123 MG/DL — HIGH (ref 70–99)
HCT VFR BLD CALC: 47.1 % — SIGNIFICANT CHANGE UP (ref 39–50)
HGB BLD-MCNC: 15.6 G/DL — SIGNIFICANT CHANGE UP (ref 13–17)
MAGNESIUM SERPL-MCNC: 2.2 MG/DL — SIGNIFICANT CHANGE UP (ref 1.6–2.6)
MCHC RBC-ENTMCNC: 29.8 PG — SIGNIFICANT CHANGE UP (ref 27–34)
MCHC RBC-ENTMCNC: 33.1 GM/DL — SIGNIFICANT CHANGE UP (ref 32–36)
MCV RBC AUTO: 90.1 FL — SIGNIFICANT CHANGE UP (ref 80–100)
NRBC # BLD: 0 /100 WBCS — SIGNIFICANT CHANGE UP (ref 0–0)
PLATELET # BLD AUTO: 269 K/UL — SIGNIFICANT CHANGE UP (ref 150–400)
POTASSIUM SERPL-MCNC: 4.1 MMOL/L — SIGNIFICANT CHANGE UP (ref 3.5–5.3)
POTASSIUM SERPL-SCNC: 4.1 MMOL/L — SIGNIFICANT CHANGE UP (ref 3.5–5.3)
RBC # BLD: 5.23 M/UL — SIGNIFICANT CHANGE UP (ref 4.2–5.8)
RBC # FLD: 11.7 % — SIGNIFICANT CHANGE UP (ref 10.3–14.5)
SODIUM SERPL-SCNC: 141 MMOL/L — SIGNIFICANT CHANGE UP (ref 135–145)
SPECIMEN SOURCE: SIGNIFICANT CHANGE UP
SPECIMEN SOURCE: SIGNIFICANT CHANGE UP
WBC # BLD: 15.55 K/UL — HIGH (ref 3.8–10.5)
WBC # FLD AUTO: 15.55 K/UL — HIGH (ref 3.8–10.5)

## 2019-05-14 PROCEDURE — 99233 SBSQ HOSP IP/OBS HIGH 50: CPT

## 2019-05-14 RX ORDER — KETOROLAC TROMETHAMINE 30 MG/ML
15 SYRINGE (ML) INJECTION ONCE
Refills: 0 | Status: DISCONTINUED | OUTPATIENT
Start: 2019-05-14 | End: 2019-05-14

## 2019-05-14 RX ORDER — CLOPIDOGREL BISULFATE 75 MG/1
75 TABLET, FILM COATED ORAL EVERY 24 HOURS
Refills: 0 | Status: DISCONTINUED | OUTPATIENT
Start: 2019-05-14 | End: 2019-05-21

## 2019-05-14 RX ORDER — METOPROLOL TARTRATE 50 MG
25 TABLET ORAL
Refills: 0 | Status: DISCONTINUED | OUTPATIENT
Start: 2019-05-14 | End: 2019-05-21

## 2019-05-14 RX ORDER — HEPARIN SODIUM 5000 [USP'U]/ML
7500 INJECTION INTRAVENOUS; SUBCUTANEOUS EVERY 8 HOURS
Refills: 0 | Status: DISCONTINUED | OUTPATIENT
Start: 2019-05-14 | End: 2019-05-21

## 2019-05-14 RX ORDER — CIPROFLOXACIN HCL 0.3 %
1 DROPS OPHTHALMIC (EYE) ONCE
Refills: 0 | Status: COMPLETED | OUTPATIENT
Start: 2019-05-14 | End: 2019-05-14

## 2019-05-14 RX ORDER — LOSARTAN POTASSIUM 100 MG/1
25 TABLET, FILM COATED ORAL DAILY
Refills: 0 | Status: DISCONTINUED | OUTPATIENT
Start: 2019-05-14 | End: 2019-05-15

## 2019-05-14 RX ORDER — ACETAMINOPHEN 500 MG
1000 TABLET ORAL ONCE
Refills: 0 | Status: COMPLETED | OUTPATIENT
Start: 2019-05-14 | End: 2019-05-14

## 2019-05-14 RX ORDER — AMLODIPINE BESYLATE 2.5 MG/1
5 TABLET ORAL ONCE
Refills: 0 | Status: COMPLETED | OUTPATIENT
Start: 2019-05-14 | End: 2019-05-14

## 2019-05-14 RX ORDER — MORPHINE SULFATE 50 MG/1
2 CAPSULE, EXTENDED RELEASE ORAL ONCE
Refills: 0 | Status: DISCONTINUED | OUTPATIENT
Start: 2019-05-14 | End: 2019-05-14

## 2019-05-14 RX ORDER — ASPIRIN/CALCIUM CARB/MAGNESIUM 324 MG
81 TABLET ORAL EVERY 24 HOURS
Refills: 0 | Status: DISCONTINUED | OUTPATIENT
Start: 2019-05-14 | End: 2019-05-21

## 2019-05-14 RX ORDER — AMLODIPINE BESYLATE 2.5 MG/1
10 TABLET ORAL EVERY 24 HOURS
Refills: 0 | Status: DISCONTINUED | OUTPATIENT
Start: 2019-05-15 | End: 2019-05-21

## 2019-05-14 RX ADMIN — SODIUM CHLORIDE 120 MILLILITER(S): 9 INJECTION INTRAMUSCULAR; INTRAVENOUS; SUBCUTANEOUS at 06:32

## 2019-05-14 RX ADMIN — MORPHINE SULFATE 2 MILLIGRAM(S): 50 CAPSULE, EXTENDED RELEASE ORAL at 01:15

## 2019-05-14 RX ADMIN — Medication 15 MILLIGRAM(S): at 21:40

## 2019-05-14 RX ADMIN — Medication 5 MILLIGRAM(S): at 22:30

## 2019-05-14 RX ADMIN — Medication 10 MILLIGRAM(S): at 12:27

## 2019-05-14 RX ADMIN — Medication 15 MILLIGRAM(S): at 19:50

## 2019-05-14 RX ADMIN — Medication 81 MILLIGRAM(S): at 12:27

## 2019-05-14 RX ADMIN — Medication 1000 MILLIGRAM(S): at 03:00

## 2019-05-14 RX ADMIN — CLOPIDOGREL BISULFATE 75 MILLIGRAM(S): 75 TABLET, FILM COATED ORAL at 12:27

## 2019-05-14 RX ADMIN — METHOCARBAMOL 1000 MILLIGRAM(S): 500 TABLET, FILM COATED ORAL at 18:08

## 2019-05-14 RX ADMIN — ATORVASTATIN CALCIUM 80 MILLIGRAM(S): 80 TABLET, FILM COATED ORAL at 22:30

## 2019-05-14 RX ADMIN — AMLODIPINE BESYLATE 5 MILLIGRAM(S): 2.5 TABLET ORAL at 06:00

## 2019-05-14 RX ADMIN — Medication 10 MILLIGRAM(S): at 05:59

## 2019-05-14 RX ADMIN — GABAPENTIN 300 MILLIGRAM(S): 400 CAPSULE ORAL at 06:07

## 2019-05-14 RX ADMIN — AMLODIPINE BESYLATE 5 MILLIGRAM(S): 2.5 TABLET ORAL at 12:27

## 2019-05-14 RX ADMIN — Medication 15 MILLIGRAM(S): at 09:28

## 2019-05-14 RX ADMIN — Medication 400 MILLIGRAM(S): at 02:44

## 2019-05-14 RX ADMIN — Medication 10 MILLIGRAM(S): at 00:50

## 2019-05-14 RX ADMIN — Medication 10 MILLIGRAM(S): at 18:09

## 2019-05-14 RX ADMIN — MORPHINE SULFATE 2 MILLIGRAM(S): 50 CAPSULE, EXTENDED RELEASE ORAL at 00:50

## 2019-05-14 RX ADMIN — GABAPENTIN 300 MILLIGRAM(S): 400 CAPSULE ORAL at 18:08

## 2019-05-14 RX ADMIN — Medication 15 MILLIGRAM(S): at 23:47

## 2019-05-14 RX ADMIN — DIPHENHYDRAMINE HYDROCHLORIDE AND LIDOCAINE HYDROCHLORIDE AND ALUMINUM HYDROXIDE AND MAGNESIUM HYDRO 15 MILLILITER(S): KIT at 18:08

## 2019-05-14 RX ADMIN — METHOCARBAMOL 1000 MILLIGRAM(S): 500 TABLET, FILM COATED ORAL at 05:59

## 2019-05-14 RX ADMIN — LISINOPRIL 10 MILLIGRAM(S): 2.5 TABLET ORAL at 06:04

## 2019-05-14 RX ADMIN — LOSARTAN POTASSIUM 25 MILLIGRAM(S): 100 TABLET, FILM COATED ORAL at 10:45

## 2019-05-14 RX ADMIN — Medication 10 MILLIGRAM(S): at 23:45

## 2019-05-14 RX ADMIN — Medication 1 DROP(S): at 18:10

## 2019-05-14 RX ADMIN — DIPHENHYDRAMINE HYDROCHLORIDE AND LIDOCAINE HYDROCHLORIDE AND ALUMINUM HYDROXIDE AND MAGNESIUM HYDRO 15 MILLILITER(S): KIT at 06:35

## 2019-05-14 RX ADMIN — Medication 25 MILLIGRAM(S): at 18:10

## 2019-05-14 RX ADMIN — Medication 15 MILLIGRAM(S): at 21:25

## 2019-05-14 NOTE — PROGRESS NOTE ADULT - SUBJECTIVE AND OBJECTIVE BOX
STATUS POST:  PEG tube placement POD 1     SUBJECTIVE: Patient seen and examined bedside, no nausea/vomiting, no bowel movement yet, passing flatus, tolerating TF    aspirin  chewable 81 milliGRAM(s) Oral every 24 hours  clopidogrel Tablet 75 milliGRAM(s) Oral every 24 hours  losartan 25 milliGRAM(s) Oral daily  metoprolol tartrate 25 milliGRAM(s) Oral two times a day      Vital Signs Last 24 Hrs  T(C): 37.1 (14 May 2019 17:15), Max: 37.1 (14 May 2019 17:15)  T(F): 98.8 (14 May 2019 17:15), Max: 98.8 (14 May 2019 17:15)  HR: 90 (14 May 2019 12:08) (70 - 92)  BP: 177/80 (14 May 2019 12:08) (161/86 - 200/102)  BP(mean): 115 (14 May 2019 12:08) (114 - 142)  RR: 16 (14 May 2019 05:46) (13 - 19)  SpO2: 90% (14 May 2019 12:08) (90% - 99%)  I&O's Detail    13 May 2019 07:01  -  14 May 2019 07:00  --------------------------------------------------------  IN:    sodium chloride 0.9%: 1320 mL  Total IN: 1320 mL    OUT:    Incontinent per Condom Catheter: 550 mL  Total OUT: 550 mL    Total NET: 770 mL      14 May 2019 07:01  -  14 May 2019 17:34  --------------------------------------------------------  IN:  Total IN: 0 mL    OUT:    Indwelling Catheter - Urethral: 800 mL  Total OUT: 800 mL    Total NET: -800 mL          General: NAD, resting comfortably in bed  C/V: NSR  Pulm: Nonlabored breathing, no respiratory distress  Abd: soft, NT/ND. PEG tubee intact, no leak. running at 20cc/hr  Extrem: WWP, no edema, SCDs in place        LABS:                        15.6   15.55 )-----------( 269      ( 14 May 2019 07:32 )             47.1     05-14    141  |  106  |  28<H>  ----------------------------<  123<H>  4.1   |  26  |  0.82    Ca    8.9      14 May 2019 07:32  Mg     2.2     05-14      PT/INR - ( 13 May 2019 07:19 )   PT: 15.3 sec;   INR: 1.34          PTT - ( 13 May 2019 07:19 )  PTT:30.6 sec      RADIOLOGY & ADDITIONAL STUDIES:

## 2019-05-14 NOTE — CHART NOTE - NSCHARTNOTEFT_GEN_A_CORE
Admitting Diagnosis:   Patient is a 54y old  Male who presents with a chief complaint of CVA (14 May 2019 07:08)      PAST MEDICAL & SURGICAL HISTORY:  S/P coronary artery stent placement  H/O non-ST elevation myocardial infarction (NSTEMI)  Pre-diabetes  Thoracic aortic aneurysm without rupture  Hyperlipidemia  Obstructive sleep apnea  HTN (hypertension)  History of aortic aneurysm repair  Gynecomastia, male  H/O arthroscopy of left knee      Current Nutrition Order:  Jevity 1.2 Donny @ 75mL/hr x 24hrs via PEG. Provides: 1800mL TV, 2160kcal, 100g pro, 1453mL free H2O, 180% RDI, 1.24g/kg IBW protein.    PO Intake: Good (%) [   ]  Fair (50-75%) [   ] Poor (<25%) [   ]- N/A NPO w/EN    GI Issues: No complaints of N/V at this time; +multiple loose BMs on 5/13 per RN    Pain: Chronic back pain; some discomfort at PEG site     Skin Integrity: lA 16; PEG     Labs:   05-14    141  |  106  |  28<H>  ----------------------------<  123<H>  4.1   |  26  |  0.82    Ca    8.9      14 May 2019 07:32  Mg     2.2     05-14      CAPILLARY BLOOD GLUCOSE          Medications:  MEDICATIONS  (STANDING):  aspirin  chewable 81 milliGRAM(s) Oral every 24 hours  atorvastatin 80 milliGRAM(s) Oral at bedtime  clopidogrel Tablet 75 milliGRAM(s) Oral every 24 hours  FIRST- Mouthwash  BLM 15 milliLiter(s) Swish and Spit two times a day  gabapentin   Solution 300 milliGRAM(s) Oral two times a day  losartan 25 milliGRAM(s) Oral daily  melatonin 5 milliGRAM(s) Oral at bedtime  methocarbamol 1000 milliGRAM(s) Oral every 8 hours  metoclopramide Injectable 10 milliGRAM(s) IV Push every 6 hours  metoprolol tartrate 25 milliGRAM(s) Oral two times a day    MEDICATIONS  (PRN):  acetaminophen    Suspension .. 650 milliGRAM(s) Oral every 6 hours PRN Mild Pain (1 - 3), Moderate Pain (4 - 6)  chlorproMAZINE    Tablet 10 milliGRAM(s) Oral every 8 hours PRN Hiccups  sodium chloride 0.65% Nasal 1 Spray(s) Both Nostrils four times a day PRN Nasal Congestion      Weight: 122kg   Daily     Daily     Weight Change: No new weights recorded since admit     Nutrition Focused Physical Exam: Completed [   ]  Not Pertinent [ X  ]    Estimated energy needs: Ideal body weight (80.7kg) used for calculations as pt >120% of IBW. Needs estimated for maintenance in adults; increased protein for s/p CVA  Calories: 25-30 kcal/kg = 3989-8427 kcal/day  Protein: 1.0-1.2 g/kg = 81-97g protein/day  Fluids: 25-30 mL/kg = 7035-4360 mL/day    Subjective: 53 yo/male with PMHx NSTEMI, CAD, aortic root aneurysm repair, r/o stroke/TIA, HTN, OLIVA, presented to OSH while on work trip with dizziness, gait ataxia, and nausea. S/p tPA push. MRI showing subacute/acute L. frontal and R. cerebellar strokes. TTE +LVH and R. distal artery occlusion. Pt transferred to [facility] for further monitoring. Pt failed FEES twice, now s/p PEG placement on 5/13. Pt seen in room, awake, alert, finishing working with PT. EN just restarted (24hrs post PEG) @ 20mL/hr. Discussed w/RN advancing feeds by 10mL Q4hrs to goal. Will continue to follow per RD protocol.     Previous Nutrition Diagnosis:  Inadequate energy intake RT current NPO status AEB 0% of EER being met at this time    Active [   ]  Resolved [ X  ]    If resolved, new PES: Increased protein needs RT increased demand for protein intake AEB s/p CVA    Goal: Pt will meet % of EER per day via tolerated route     Recommendations:  1. Advance to goal TF rate of 75mL/hr as tolerated. Monitor for s/s intolerance; maintain aspiration precautions at all times   2. Monitor lytes and replete prn  3. Consider addition of psyllium if loose stool persists   4. Weekly weights   5. POC BG Q6hrs     Education: Discussed advancement of feeds via PEG    Risk Level: High [  X ] Moderate [   ] Low [   ]

## 2019-05-14 NOTE — PROGRESS NOTE ADULT - SUBJECTIVE AND OBJECTIVE BOX
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 Following history obtained from Dr. Montez, Jan [phone] who cared for pt at [facility]: TPA pushed at 4:11 am on 5/1/19. Initial CTH negative. CTP showing small focal area of decreased perfusion in L frontal area. CTA H/N showed R distal vertebral artery occlusion of unclear chronicity. CTH 24 hours later reportedly negative for bleed. Pt started on hep gtt, asa 81, lipitor, losartan, lopressor.  MRI showed subacute/acute L frontal and R cerebellar strokes. MRA negative. TTE showed LVH and LVEF 60%. Pt failed dysphagia screen, NGT placed for feeds and medications. Pt ambulated with PT. Pt noted to have hiccups for which he was on baclofen (per patient without relief). Of note pt has been suctioning his own secretions and tubing noted to have blood tinged secretions. (08 May 2019 20:12)                            15.6   15.55 )-----------( 269      ( 14 May 2019 07:32 )             47.1       05-14    141  |  106  |  28<H>  ----------------------------<  123<H>  4.1   |  26  |  0.82    Ca    8.9      14 May 2019 07:32  Mg     2.2     05-14      Vital Signs Last 24 Hrs  T(C): 36.3 (14 May 2019 13:33), Max: 36.8 (13 May 2019 21:38)  T(F): 97.4 (14 May 2019 13:33), Max: 98.3 (13 May 2019 21:38)  HR: 90 (14 May 2019 12:08) (70 - 92)  BP: 177/80 (14 May 2019 12:08) (161/86 - 200/102)  BP(mean): 115 (14 May 2019 12:08) (114 - 142)  RR: 16 (14 May 2019 05:46) (13 - 19)  SpO2: 90% (14 May 2019 12:08) (90% - 99%)    MEDICATIONS  (STANDING):  aspirin  chewable 81 milliGRAM(s) Oral every 24 hours  atorvastatin 80 milliGRAM(s) Oral at bedtime  ciprofloxacin  0.3% Ophthalmic Solution 1 Drop(s) Both EYES once  clopidogrel Tablet 75 milliGRAM(s) Oral every 24 hours  FIRST- Mouthwash  BLM 15 milliLiter(s) Swish and Spit two times a day  gabapentin   Solution 300 milliGRAM(s) Oral two times a day  losartan 25 milliGRAM(s) Oral daily  melatonin 5 milliGRAM(s) Oral at bedtime  methocarbamol 1000 milliGRAM(s) Oral every 8 hours  metoclopramide Injectable 10 milliGRAM(s) IV Push every 6 hours  metoprolol tartrate 25 milliGRAM(s) Oral two times a day    MEDICATIONS  (PRN):  acetaminophen    Suspension .. 650 milliGRAM(s) Oral every 6 hours PRN Mild Pain (1 - 3), Moderate Pain (4 - 6)  chlorproMAZINE    Tablet 10 milliGRAM(s) Oral every 8 hours PRN Hiccups  sodium chloride 0.65% Nasal 1 Spray(s) Both Nostrils four times a day PRN Nasal Congestion    Currently Undergoing Physical Therapy at bedside.    Functional Status Assessment:    Pain Assessment/Number Scale (0-10) Adult  Presence of Pain: complains of pain/discomfort  Body Location: back pain  Pain Rating (0-10): Rest: 7   Pain Rating (0-10): Activity: 7     Cognitive/Neuro      Cognitive/Perceptual/Neuro  Cognitive/Neuro/Behavioral [WDL Definition: Alert; opens eyes spontaneously; arouses to voice or touch; oriented x 4; follows commands; speech spontaneous, logical; purposeful motor response; behavior appropriate to situation]: WDL  Level of Consciousness: alert  Arousal Level: opens eyes spontaneously;  arouses to voice  Orientation: oriented x 4  Speech: clear  Mood/Behavior: calm;  cooperative    Language Assistance  Preferred Language to Address Healthcare Preferred Language to Address Healthcare: English    Therapeutic Interventions      Bed Mobility  Bed Mobility Training Scooting: stand-by assist;  contact guard;  1 person assist;  verbal cues;  nonverbal cues (demo/gestures)  Bed Mobility Training Sit-to-Supine: not performed secondary pt left OOB sitting in the chair.   Bed Mobility Training Supine-to-Sit: minimum assist (75% patient effort);  stand-by assist;  1 person assist;  verbal cues;  nonverbal cues (demo/gestures)  Bed Mobility Training Limitations: decreased ability to use arms for pushing/pulling;  decreased ability to use legs for bridging/pushing;  impaired ability to control trunk for mobility;  decreased strength;  impaired balance;  impaired postural control    Sit-Stand Transfer Training  Transfer Training Sit-to-Stand Transfer: moderate assist (50% patient effort);  2 person assist;  verbal cues;  nonverbal cues (demo/gestures);  weight-bearing as tolerated   rolling walker;  2 trials   Transfer Training Stand-to-Sit Transfer: moderate assist (50% patient effort);  2 person assist;  verbal cues;  nonverbal cues (demo/gestures);  weight-bearing as tolerated   rolling walker  Sit-to-Stand Transfer Training Transfer Safety Analysis: decreased balance;  decreased strength;  impaired balance;  impaired postural control;  poor eccentric control, requires verbal cues from PT for proper hands placement during transfer. ;  rolling walker    Gait Training  Gait Training: moderate assist (50% patient effort);  2 person assist;  nonverbal cues (demo/gestures);  verbal cues;  weight-bearing as tolerated   rolling walker;  20 feet x 2   Gait Analysis: 3-point gait   ataxic;  decreased imelda;  increased time in double stance;  decreased hip/knee flexion;  decreased step length;  unsteady gait, ataxic, difficulty placing RLE in appropriate position during ambulation, lose of balance twice and requires mod A x 2 to regain balance, +R listing  ;  decreased strength;  impaired balance;  impaired postural control  Type of Rest Type of Rest: sitting  Duration of Rest Duration of Rest: with 1 brief sitting break (2 mins)     Therapeutic Exercise  Therapeutic Exercise Detail: Seated ther ex: Tapping an object from the floor bilaterally x 10 to work on coordination on RLE. Dip on the chair x 5 bilaterallyI nstruct HEP(LAQ,hip flexion, ankle pump), and perform ther ex while family present .CN Testing: B/L Frontalis intact; B/L buccinator intact; smile symmetrical; tongue protrusion at midline; B/L eyes open/close intact; Shoulder elevation: intact bilaterally; Vision H-Test: bilateral tracking and smooth pursuit intact; Convergence/Divergence: intact; Vision Quadrant Test: intact bilaterally, but pt complains double vision when look to the right side with b/l eyes open. Sensation: SILT MMT: LUE and LLE 5/5, RUE~4/5 grossly , LLE~4/5 grossly FNF: R dysmetria, L intact.             PM&R Impression: as above    Disposition Plan Recommendations:  acute rehab placement

## 2019-05-14 NOTE — PROGRESS NOTE ADULT - PROBLEM SELECTOR PLAN 3
Pt noted to have dysphagia, failed dysphagia screen, arrived with NGT from [facility]. Etiology likely 2/2 stroke  - FEEs failed x2  -S/p PEG placed by GS on 5/13, currently tolerated TF with Jevity 1.2

## 2019-05-14 NOTE — PROGRESS NOTE ADULT - SUBJECTIVE AND OBJECTIVE BOX
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 Cardiothoracic Surgery Service. Pt initially presented  to [facility] in [place], [place] (where he was for work) on 5/1/19 with acute onset dizziness, gait ataxia (falling to R), found to have subacute/acute L frontal and R cerebellar strokes and R distal vertebral artery occlusion of unclear chronicity. TPA was pushed, subsequent CT neg for hemorrhage. Pt arrived on heparin gtt with NGT for dysphagia. Night of admission Pt spiked 101.2 rectal (met SIRS criteria for fever + leukocytosis) received 5 days of Vanco/Zosyn for presumed HAP/aspiration PNA then off when Procal/blood cx NGTD. Noted to have bloody oropharyngeal secretions from self-suctioning with Yankeur;  seen by ENT who saw minor trauma, given supportive care and red rubber suctioning.  Heparin was d/c’d then transitioned to Lovenox. Pt had intractable hiccups, refractory to baclofen/thoridazine/reglan but generally improving.  NGT re-placed and started on TF. Failed FEEs x2, PEG placed by Gen Surg (EGD demonstrating mild gastritis at fundus, no abnormalities in proximal duodenum. PEG placed, skin bumper @ 4cm). PT recommending Acute Rehab. 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 Cardiothoracic Surgery Service. Pt initially presented  to [facility] in [place], [place] (where he was for work) on 5/1/19 with acute onset dizziness, gait ataxia (falling to R), found to have subacute/acute L frontal and R cerebellar strokes and R distal vertebral artery occlusion of unclear chronicity. TPA was pushed, subsequent CT neg for hemorrhage. Pt arrived on heparin gtt with NGT for dysphagia. Night of admission Pt spiked 101.2 rectal (met SIRS criteria for fever + leukocytosis) received 5 days of Vanco/Zosyn for presumed HAP/aspiration PNA then off when Procal/blood cx NGTD. Noted to have bloody oropharyngeal secretions from self-suctioning with Yankeur;  seen by ENT who saw minor trauma, given supportive care and red rubber suctioning.  Heparin was d/c’d then transitioned to Lovenox. Pt had intractable hiccups, refractory to baclofen/thoridazine/reglan but generally improving.  NGT re-placed and started on TF. Failed FEEs x2, PEG placed by Gen Surg (EGD demonstrating mild gastritis at fundus, no abnormalities in proximal duodenum. PEG placed, skin bumper @ 4cm). PT recommending Acute Rehab.       INTERVAL HPI/OVERNIGHT EVENTS:  Patient was seen and examined at bedside. As per nurse and patient, no o/n events, patient resting comfortably. No complaints at this time. Patient denies: fever, chills, dizziness, weakness, HA, Changes in vision, CP, palpitations, SOB, cough, N/V/D/C, dysuria, changes in bowel movements, LE edema. ROS otherwise negative.    VITAL SIGNS:  T(F): 97.4 (05-14-19 @ 13:33)  HR: 90 (05-14-19 @ 12:08)  BP: 177/80 (05-14-19 @ 12:08)  RR: 16 (05-14-19 @ 05:46)  SpO2: 90% (05-14-19 @ 12:08)  Wt(kg): --    PHYSICAL EXAM:    PHYSICAL EXAM:    Constitutional: WDWN, NAD, obese  HEENT: PERRL, EOMI, sclera non-icteric, neck supple, trachea midline, no masses, no JVD, MMM, good dentition, NGT in place   Respiratory: CTA b/l, good air entry b/l, no wheezing, no rhonchi, no rales, without accessory muscle use and no intercostal retractions  Cardiovascular: RRR, normal S1S2, no M/R/G  Gastrointestinal: soft, NTND, no masses palpable, BS normal, PEG site intact   Extremities: Warm, well perfused, pulses equal bilateral upper and lower extremities, no edema, no clubbing  Neurological: AAOx3, CN Grossly intact  Skin: Normal temperature, warm, dry  Neurologic:  - Mental Status:  AAOx3; speech is fluent with intact naming, repetition, and comprehension  - Cranial Nerves II-XII:    II:  PERRLA; visual fields are full to confrontation  III, IV, VI:  EOMI, nystagmus on right gaze   V:  facial sensation is intact in the V1-V3 distribution bilaterally.  VII:  face is symmetric with normal eye closure and smile  VIII:  hearing is intact to finger rub  IX, X:  uvula is midline and soft palate rises symmetrically  XI:  head turning and shoulder shrug are intact bilaterally  XII:  tongue protrudes in the midline  - Motor:  strength is 5/5 throughout; normal muscle bulk and tone throughout; no pronator drift  - Reflexes:  2+ and symmetric at the biceps, triceps, brachioradialis, knees, and ankles;  plantar reflexes downgoing bilaterally  - Sensory:  intact to light touch, pin prick, vibration, and joint-position sense throughout  - Coordination:  finger-nose-finger and heel-knee-shin intact without dysmetria; rapid alternating hand movements intact, right drift  - Gait: deferred    MEDICATIONS  (STANDING):  aspirin  chewable 81 milliGRAM(s) Oral every 24 hours  atorvastatin 80 milliGRAM(s) Oral at bedtime  ciprofloxacin  0.3% Ophthalmic Solution 1 Drop(s) Both EYES once  clopidogrel Tablet 75 milliGRAM(s) Oral every 24 hours  FIRST- Mouthwash  BLM 15 milliLiter(s) Swish and Spit two times a day  gabapentin   Solution 300 milliGRAM(s) Oral two times a day  losartan 25 milliGRAM(s) Oral daily  melatonin 5 milliGRAM(s) Oral at bedtime  methocarbamol 1000 milliGRAM(s) Oral every 8 hours  metoclopramide Injectable 10 milliGRAM(s) IV Push every 6 hours  metoprolol tartrate 25 milliGRAM(s) Oral two times a day    MEDICATIONS  (PRN):  acetaminophen    Suspension .. 650 milliGRAM(s) Oral every 6 hours PRN Mild Pain (1 - 3), Moderate Pain (4 - 6)  chlorproMAZINE    Tablet 10 milliGRAM(s) Oral every 8 hours PRN Hiccups  sodium chloride 0.65% Nasal 1 Spray(s) Both Nostrils four times a day PRN Nasal Congestion      Allergies    erythromycin (Unknown)  tetanus toxoids (Fever)    Intolerances        LABS:                        15.6   15.55 )-----------( 269      ( 14 May 2019 07:32 )             47.1     05-14    141  |  106  |  28<H>  ----------------------------<  123<H>  4.1   |  26  |  0.82    Ca    8.9      14 May 2019 07:32  Mg     2.2     05-14      PT/INR - ( 13 May 2019 07:19 )   PT: 15.3 sec;   INR: 1.34          PTT - ( 13 May 2019 07:19 )  PTT:30.6 sec      RADIOLOGY & ADDITIONAL TESTS:  Reviewed PGY1 Transfer Note: 7Lac to [facility]   54M PMH of CAD, [facility] 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 Pt initially presented  to [facility] in [place], [place] (where he was for work) on 5/1/19 with acute onset dizziness, gait ataxia (falling to R), found to have subacute/acute L frontal and R cerebellar strokes and R distal vertebral artery occlusion of unclear chronicity. TPA was pushed, subsequent CT neg for hemorrhage. Pt arrived on heparin gtt with NGT for dysphagia. Night of admission Pt spiked 101.2 rectal (met SIRS criteria for fever + leukocytosis) received 5 days of Vanco/Zosyn for presumed HAP/aspiration PNA then off when Procal/blood cx NGTD. Noted to have bloody oropharyngeal secretions from self-suctioning with Yankeur;  seen by ENT who saw minor trauma, given supportive care and red rubber suctioning.  Heparin was d/c’d then transitioned to Lovenox. Pt had intractable hiccups, refractory to baclofen/thoridazine/reglan but generally improving.  NGT re-placed and started on TF. Failed FEEs x2, PEG placed by Gen Surg (EGD demonstrating mild gastritis at fundus, no abnormalities in proximal duodenum. PEG placed, skin bumper @ 4cm). PT recommending Acute Rehab. Stable for s/d to [facility] for Auth.       INTERVAL HPI/OVERNIGHT EVENTS:  Patient was seen and examined at bedside. As per nurse and patient, no o/n events, patient resting comfortably. No complaints at this time. Patient denies: fever, chills, dizziness, weakness, HA, Changes in vision, CP, palpitations, SOB, cough, N/V/D/C, dysuria, changes in bowel movements, LE edema. ROS otherwise negative.    VITAL SIGNS:  T(F): 97.4 (05-14-19 @ 13:33)  HR: 90 (05-14-19 @ 12:08)  BP: 177/80 (05-14-19 @ 12:08)  RR: 16 (05-14-19 @ 05:46)  SpO2: 90% (05-14-19 @ 12:08)  Wt(kg): --    PHYSICAL EXAM:    PHYSICAL EXAM:    Constitutional: WDWN, NAD, obese  HEENT: PERRL, EOMI, sclera non-icteric, neck supple, trachea midline, no masses, no JVD, MMM, good dentition, NGT in place   Respiratory: CTA b/l, good air entry b/l, no wheezing, no rhonchi, no rales, without accessory muscle use and no intercostal retractions  Cardiovascular: RRR, normal S1S2, no M/R/G  Gastrointestinal: soft, NTND, no masses palpable, BS normal, PEG site intact   Extremities: Warm, well perfused, pulses equal bilateral upper and lower extremities, no edema, no clubbing  Neurological: AAOx3, CN Grossly intact  Skin: Normal temperature, warm, dry  Neurologic:  - Mental Status:  AAOx3; speech is fluent with intact naming, repetition, and comprehension  - Cranial Nerves II-XII:    II:  PERRLA; visual fields are full to confrontation  III, IV, VI:  EOMI, nystagmus on right gaze   V:  facial sensation is intact in the V1-V3 distribution bilaterally.  VII:  face is symmetric with normal eye closure and smile  VIII:  hearing is intact to finger rub  IX, X:  uvula is midline and soft palate rises symmetrically  XI:  head turning and shoulder shrug are intact bilaterally  XII:  tongue protrudes in the midline  - Motor:  strength is 5/5 throughout; normal muscle bulk and tone throughout; no pronator drift  - Reflexes:  2+ and symmetric at the biceps, triceps, brachioradialis, knees, and ankles;  plantar reflexes downgoing bilaterally  - Sensory:  intact to light touch, pin prick, vibration, and joint-position sense throughout  - Coordination:  finger-nose-finger and heel-knee-shin intact without dysmetria; rapid alternating hand movements intact, right drift  - Gait: deferred    MEDICATIONS  (STANDING):  aspirin  chewable 81 milliGRAM(s) Oral every 24 hours  atorvastatin 80 milliGRAM(s) Oral at bedtime  ciprofloxacin  0.3% Ophthalmic Solution 1 Drop(s) Both EYES once  clopidogrel Tablet 75 milliGRAM(s) Oral every 24 hours  FIRST- Mouthwash  BLM 15 milliLiter(s) Swish and Spit two times a day  gabapentin   Solution 300 milliGRAM(s) Oral two times a day  losartan 25 milliGRAM(s) Oral daily  melatonin 5 milliGRAM(s) Oral at bedtime  methocarbamol 1000 milliGRAM(s) Oral every 8 hours  metoclopramide Injectable 10 milliGRAM(s) IV Push every 6 hours  metoprolol tartrate 25 milliGRAM(s) Oral two times a day    MEDICATIONS  (PRN):  acetaminophen    Suspension .. 650 milliGRAM(s) Oral every 6 hours PRN Mild Pain (1 - 3), Moderate Pain (4 - 6)  chlorproMAZINE    Tablet 10 milliGRAM(s) Oral every 8 hours PRN Hiccups  sodium chloride 0.65% Nasal 1 Spray(s) Both Nostrils four times a day PRN Nasal Congestion      Allergies    erythromycin (Unknown)  tetanus toxoids (Fever)    Intolerances        LABS:                        15.6   15.55 )-----------( 269      ( 14 May 2019 07:32 )             47.1     05-14    141  |  106  |  28<H>  ----------------------------<  123<H>  4.1   |  26  |  0.82    Ca    8.9      14 May 2019 07:32  Mg     2.2     05-14      PT/INR - ( 13 May 2019 07:19 )   PT: 15.3 sec;   INR: 1.34          PTT - ( 13 May 2019 07:19 )  PTT:30.6 sec      RADIOLOGY & ADDITIONAL TESTS:  Reviewed

## 2019-05-14 NOTE — PROGRESS NOTE ADULT - PROBLEM SELECTOR PLAN 1
Pt presented to [facility]) 5/1/19 with acute onset dizziness, gait ataxia (falling to R), s/p tPA on 5/1, found to have subacute/acute L frontal and R cerebellar strokes and R distal vertebral artery occlusion of unclear chronicity, transferred to [facility] for further management.  - Initial CTH negative, CTH 24 hours later reportedly negative for bleed.   - MRI showed subacute/acute L frontal and R cerebellar strokes.   - MRA negative  - TTE showed LVH and LVEF 60%  - Pt failed dysphagia screen, NGT failed FEEs x2 now s/p PEG 5/13  - atorvastatin 80mg qHS  - PT/OT  - Check hgb A1c 6, TSH WNL lipid panel WNL  - Thorazine 25mg q8h for hiccups    #Occlusion of R vertebral artery  - Plan as above Pt presented to [facility]) 5/1/19 with acute onset dizziness, gait ataxia (falling to R), s/p tPA on 5/1, found to have subacute/acute L frontal and R cerebellar strokes and R distal vertebral artery occlusion of unclear chronicity, transferred to [facility] for further management.  - Initial CTH negative, CTH 24 hours later reportedly negative for bleed.   - MRI showed subacute/acute L frontal and R cerebellar strokes.   - MRA negative  - TTE showed LVH and LVEF 60%  - Pt failed dysphagia screen, NGT failed FEEs x2 now s/p PEG 5/13  - atorvastatin 80mg qHS  - PT/OT  - Check hgb A1c 6, TSH WNL lipid panel WNL  - Thorazine 25mg q8h for hiccups  -EP notified on 5/14 Pt is leaving tomorrow and will need ILR   #Occlusion of R vertebral artery  - Plan as above

## 2019-05-15 LAB
ALBUMIN SERPL ELPH-MCNC: 3.1 G/DL — LOW (ref 3.3–5)
ALP SERPL-CCNC: 92 U/L — SIGNIFICANT CHANGE UP (ref 40–120)
ALT FLD-CCNC: 66 U/L — HIGH (ref 10–45)
ANION GAP SERPL CALC-SCNC: 11 MMOL/L — SIGNIFICANT CHANGE UP (ref 5–17)
AST SERPL-CCNC: 51 U/L — HIGH (ref 10–40)
BILIRUB DIRECT SERPL-MCNC: <0.2 MG/DL — SIGNIFICANT CHANGE UP (ref 0–0.2)
BILIRUB INDIRECT FLD-MCNC: >0.1 MG/DL — LOW (ref 0.2–1)
BILIRUB SERPL-MCNC: 0.3 MG/DL — SIGNIFICANT CHANGE UP (ref 0.2–1.2)
BUN SERPL-MCNC: 27 MG/DL — HIGH (ref 7–23)
CALCIUM SERPL-MCNC: 9.1 MG/DL — SIGNIFICANT CHANGE UP (ref 8.4–10.5)
CHLORIDE SERPL-SCNC: 104 MMOL/L — SIGNIFICANT CHANGE UP (ref 96–108)
CO2 SERPL-SCNC: 26 MMOL/L — SIGNIFICANT CHANGE UP (ref 22–31)
CREAT SERPL-MCNC: 0.85 MG/DL — SIGNIFICANT CHANGE UP (ref 0.5–1.3)
GLUCOSE SERPL-MCNC: 151 MG/DL — HIGH (ref 70–99)
HCT VFR BLD CALC: 42.6 % — SIGNIFICANT CHANGE UP (ref 39–50)
HGB BLD-MCNC: 14.5 G/DL — SIGNIFICANT CHANGE UP (ref 13–17)
MAGNESIUM SERPL-MCNC: 2.1 MG/DL — SIGNIFICANT CHANGE UP (ref 1.6–2.6)
MCHC RBC-ENTMCNC: 30.3 PG — SIGNIFICANT CHANGE UP (ref 27–34)
MCHC RBC-ENTMCNC: 34 GM/DL — SIGNIFICANT CHANGE UP (ref 32–36)
MCV RBC AUTO: 88.9 FL — SIGNIFICANT CHANGE UP (ref 80–100)
NRBC # BLD: 0 /100 WBCS — SIGNIFICANT CHANGE UP (ref 0–0)
PLATELET # BLD AUTO: 271 K/UL — SIGNIFICANT CHANGE UP (ref 150–400)
POTASSIUM SERPL-MCNC: 3.7 MMOL/L — SIGNIFICANT CHANGE UP (ref 3.5–5.3)
POTASSIUM SERPL-SCNC: 3.7 MMOL/L — SIGNIFICANT CHANGE UP (ref 3.5–5.3)
PROT SERPL-MCNC: 6.5 G/DL — SIGNIFICANT CHANGE UP (ref 6–8.3)
RBC # BLD: 4.79 M/UL — SIGNIFICANT CHANGE UP (ref 4.2–5.8)
RBC # FLD: 11.5 % — SIGNIFICANT CHANGE UP (ref 10.3–14.5)
SODIUM SERPL-SCNC: 141 MMOL/L — SIGNIFICANT CHANGE UP (ref 135–145)
WBC # BLD: 13.63 K/UL — HIGH (ref 3.8–10.5)
WBC # FLD AUTO: 13.63 K/UL — HIGH (ref 3.8–10.5)

## 2019-05-15 PROCEDURE — 99233 SBSQ HOSP IP/OBS HIGH 50: CPT

## 2019-05-15 PROCEDURE — 33285 INSJ SUBQ CAR RHYTHM MNTR: CPT

## 2019-05-15 RX ORDER — METOCLOPRAMIDE HCL 10 MG
10 TABLET ORAL EVERY 6 HOURS
Refills: 0 | Status: COMPLETED | OUTPATIENT
Start: 2019-05-15 | End: 2019-05-18

## 2019-05-15 RX ORDER — LOSARTAN POTASSIUM 100 MG/1
25 TABLET, FILM COATED ORAL ONCE
Refills: 0 | Status: DISCONTINUED | OUTPATIENT
Start: 2019-05-15 | End: 2019-05-15

## 2019-05-15 RX ORDER — KETOROLAC TROMETHAMINE 30 MG/ML
15 SYRINGE (ML) INJECTION ONCE
Refills: 0 | Status: DISCONTINUED | OUTPATIENT
Start: 2019-05-15 | End: 2019-05-15

## 2019-05-15 RX ORDER — ACETAMINOPHEN 500 MG
1000 TABLET ORAL ONCE
Refills: 0 | Status: COMPLETED | OUTPATIENT
Start: 2019-05-15 | End: 2019-05-15

## 2019-05-15 RX ORDER — OXYCODONE AND ACETAMINOPHEN 5; 325 MG/1; MG/1
1 TABLET ORAL EVERY 6 HOURS
Refills: 0 | Status: DISCONTINUED | OUTPATIENT
Start: 2019-05-15 | End: 2019-05-21

## 2019-05-15 RX ORDER — POTASSIUM CHLORIDE 20 MEQ
40 PACKET (EA) ORAL ONCE
Refills: 0 | Status: COMPLETED | OUTPATIENT
Start: 2019-05-15 | End: 2019-05-15

## 2019-05-15 RX ORDER — LOSARTAN POTASSIUM 100 MG/1
25 TABLET, FILM COATED ORAL DAILY
Refills: 0 | Status: DISCONTINUED | OUTPATIENT
Start: 2019-05-16 | End: 2019-05-21

## 2019-05-15 RX ORDER — METHOCARBAMOL 500 MG/1
1000 TABLET, FILM COATED ORAL EVERY 8 HOURS
Refills: 0 | Status: DISCONTINUED | OUTPATIENT
Start: 2019-05-15 | End: 2019-05-21

## 2019-05-15 RX ADMIN — OXYCODONE AND ACETAMINOPHEN 1 TABLET(S): 5; 325 TABLET ORAL at 17:11

## 2019-05-15 RX ADMIN — Medication 650 MILLIGRAM(S): at 07:45

## 2019-05-15 RX ADMIN — Medication 1000 MILLIGRAM(S): at 20:25

## 2019-05-15 RX ADMIN — Medication 10 MILLIGRAM(S): at 19:44

## 2019-05-15 RX ADMIN — METHOCARBAMOL 1000 MILLIGRAM(S): 500 TABLET, FILM COATED ORAL at 06:31

## 2019-05-15 RX ADMIN — OXYCODONE AND ACETAMINOPHEN 1 TABLET(S): 5; 325 TABLET ORAL at 08:45

## 2019-05-15 RX ADMIN — Medication 81 MILLIGRAM(S): at 12:37

## 2019-05-15 RX ADMIN — GABAPENTIN 300 MILLIGRAM(S): 400 CAPSULE ORAL at 17:45

## 2019-05-15 RX ADMIN — HEPARIN SODIUM 7500 UNIT(S): 5000 INJECTION INTRAVENOUS; SUBCUTANEOUS at 14:49

## 2019-05-15 RX ADMIN — Medication 10 MILLIGRAM(S): at 06:31

## 2019-05-15 RX ADMIN — DIPHENHYDRAMINE HYDROCHLORIDE AND LIDOCAINE HYDROCHLORIDE AND ALUMINUM HYDROXIDE AND MAGNESIUM HYDRO 15 MILLILITER(S): KIT at 06:33

## 2019-05-15 RX ADMIN — GABAPENTIN 300 MILLIGRAM(S): 400 CAPSULE ORAL at 06:34

## 2019-05-15 RX ADMIN — METHOCARBAMOL 1000 MILLIGRAM(S): 500 TABLET, FILM COATED ORAL at 01:47

## 2019-05-15 RX ADMIN — HEPARIN SODIUM 7500 UNIT(S): 5000 INJECTION INTRAVENOUS; SUBCUTANEOUS at 22:10

## 2019-05-15 RX ADMIN — CLOPIDOGREL BISULFATE 75 MILLIGRAM(S): 75 TABLET, FILM COATED ORAL at 12:37

## 2019-05-15 RX ADMIN — HEPARIN SODIUM 7500 UNIT(S): 5000 INJECTION INTRAVENOUS; SUBCUTANEOUS at 06:30

## 2019-05-15 RX ADMIN — LOSARTAN POTASSIUM 25 MILLIGRAM(S): 100 TABLET, FILM COATED ORAL at 06:31

## 2019-05-15 RX ADMIN — OXYCODONE AND ACETAMINOPHEN 1 TABLET(S): 5; 325 TABLET ORAL at 08:33

## 2019-05-15 RX ADMIN — Medication 40 MILLIEQUIVALENT(S): at 12:37

## 2019-05-15 RX ADMIN — DIPHENHYDRAMINE HYDROCHLORIDE AND LIDOCAINE HYDROCHLORIDE AND ALUMINUM HYDROXIDE AND MAGNESIUM HYDRO 15 MILLILITER(S): KIT at 17:45

## 2019-05-15 RX ADMIN — METHOCARBAMOL 1000 MILLIGRAM(S): 500 TABLET, FILM COATED ORAL at 22:15

## 2019-05-15 RX ADMIN — Medication 15 MILLIGRAM(S): at 00:05

## 2019-05-15 RX ADMIN — Medication 650 MILLIGRAM(S): at 06:35

## 2019-05-15 RX ADMIN — Medication 25 MILLIGRAM(S): at 06:33

## 2019-05-15 RX ADMIN — OXYCODONE AND ACETAMINOPHEN 1 TABLET(S): 5; 325 TABLET ORAL at 16:41

## 2019-05-15 RX ADMIN — METHOCARBAMOL 1000 MILLIGRAM(S): 500 TABLET, FILM COATED ORAL at 17:02

## 2019-05-15 RX ADMIN — Medication 25 MILLIGRAM(S): at 17:02

## 2019-05-15 RX ADMIN — Medication 5 MILLIGRAM(S): at 22:10

## 2019-05-15 RX ADMIN — Medication 10 MILLIGRAM(S): at 17:02

## 2019-05-15 RX ADMIN — ATORVASTATIN CALCIUM 80 MILLIGRAM(S): 80 TABLET, FILM COATED ORAL at 22:09

## 2019-05-15 RX ADMIN — AMLODIPINE BESYLATE 10 MILLIGRAM(S): 2.5 TABLET ORAL at 06:31

## 2019-05-15 RX ADMIN — Medication 400 MILLIGRAM(S): at 19:37

## 2019-05-15 NOTE — PROGRESS NOTE ADULT - SUBJECTIVE AND OBJECTIVE BOX
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 for treatment of stroke.  EPS was called for ILR placment.      MEDICATIONS  (STANDING):  amLODIPine   Tablet 10 milliGRAM(s) Oral every 24 hours  aspirin  chewable 81 milliGRAM(s) Oral every 24 hours  atorvastatin 80 milliGRAM(s) Oral at bedtime  clopidogrel Tablet 75 milliGRAM(s) Oral every 24 hours  FIRST- Mouthwash  BLM 15 milliLiter(s) Swish and Spit two times a day  gabapentin   Solution 300 milliGRAM(s) Oral two times a day  heparin  Injectable 7500 Unit(s) SubCutaneous every 8 hours  losartan 25 milliGRAM(s) Enteral Tube once  melatonin 5 milliGRAM(s) Oral at bedtime  methocarbamol 1000 milliGRAM(s) Oral every 8 hours  metoclopramide Injectable 10 milliGRAM(s) IV Push every 6 hours  metoprolol tartrate 25 milliGRAM(s) Oral two times a day      Telemetry: sinus rhythm           General:  NAD        HEENT:    PERRL, EOMI	  Neck: Supple, - JVD  Cardiovascular: S1 S2, No JVD  Respiratory: CTA B/L 	  Gastrointestinal:  Soft, Non-tender, + BS	, s/p PEG  Skin: No rashes, No ecchymoses, No cyanosis  Extremities: no edema  Vascular: Peripheral pulses palpable  Psychiatry: A & O x 3         Labs:                                                               14.5   13.63 )-----------( 271      ( 15 May 2019 07:39 )             42.6     05-15    141  |  104  |  27<H>  ----------------------------<  151<H>  3.7   |  26  |  0.85    Ca    9.1      15 May 2019 07:39  Mg     2.1     05-15    TPro  6.5  /  Alb  3.1<L>  /  TBili  0.3  /  DBili  <0.2  /  AST  51<H>  /  ALT  66<H>  /  AlkPhos  92  05-15      Assessment/Plan:  55 yo M with history of CAD, NSTEMI s/p thrombectomy/FADI to mRCA (Oct 2017), 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 Today patient had successful placement of ILR for long term cardiac monitoring. PAtient may follow up with  as outpatient.

## 2019-05-15 NOTE — PROGRESS NOTE ADULT - PROBLEM SELECTOR PLAN 1
Pt presented to [facility]) 5/1/19 with acute onset dizziness, gait ataxia (falling to R), s/p tPA on 5/1, found to have subacute/acute L frontal and R cerebellar strokes and R distal vertebral artery occlusion of unclear chronicity, transferred to [facility] for further management.  - Initial CTH negative, CTH 24 hours later reportedly negative for bleed.   - MRI showed subacute/acute L frontal and R cerebellar strokes.   - MRA negative  - TTE showed LVH and LVEF 60%  - Pt failed dysphagia screen, NGT failed FEEs x2 now s/p PEG 5/13  - atorvastatin 80mg qHS  - PT/OT  - Check hgb A1c 6, TSH WNL lipid panel WNL  - Thorazine 25mg q8h for hiccups  -EP notified on 5/14 Pt is leaving tomorrow and will need ILR   #Occlusion of R vertebral artery  - Plan as above Pt presented to [facility]) 5/1/19 with acute onset dizziness, gait ataxia (falling to R), s/p tPA on 5/1, found to have subacute/acute L frontal and R cerebellar strokes and R distal vertebral artery occlusion of unclear chronicity, transferred to [facility] for further management.  - Initial CTH negative, CTH 24 hours later reportedly negative for bleed.   - MRI showed subacute/acute L frontal and R cerebellar strokes.   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

## 2019-05-15 NOTE — PROGRESS NOTE ADULT - PROBLEM SELECTOR PLAN 3
Pt noted to have dysphagia, failed dysphagia screen, arrived with NGT from [facility]. Etiology likely 2/2 stroke  - FEEs failed x2  -S/p PEG placed by GS on 5/13, currently tolerated TF with Jevity 1.2 Pt noted to have dysphagia, failed dysphagia screen, arrived with NGT from [facility]. Etiology likely 2/2 stroke  - FEEs failed x2  -S/p PEG placed by GS on 5/13, currently tolerated TF with Jevity 1.2 which are given during the day and held at night

## 2019-05-15 NOTE — PROGRESS NOTE ADULT - SUBJECTIVE AND OBJECTIVE BOX
STATUS POST:  PEG tube placement POD 2     SUBJECTIVE: Patient seen and examined bedside, tolerating TF at 75cc/hr (goal), no nausea/vomiting, no abdominal pain.     amLODIPine   Tablet 10 milliGRAM(s) Oral every 24 hours  aspirin  chewable 81 milliGRAM(s) Oral every 24 hours  clopidogrel Tablet 75 milliGRAM(s) Oral every 24 hours  heparin  Injectable 7500 Unit(s) SubCutaneous every 8 hours  losartan 25 milliGRAM(s) Oral daily  metoprolol tartrate 25 milliGRAM(s) Oral two times a day      Vital Signs Last 24 Hrs  T(C): 36.7 (15 May 2019 05:25), Max: 37.2 (15 May 2019 01:30)  T(F): 98 (15 May 2019 05:25), Max: 98.9 (15 May 2019 01:30)  HR: 71 (15 May 2019 05:45) (69 - 92)  BP: 183/94 (15 May 2019 05:25) (161/86 - 194/94)  BP(mean): 118 (15 May 2019 00:49) (114 - 135)  RR: 16 (15 May 2019 05:45) (14 - 20)  SpO2: 93% (15 May 2019 05:45) (90% - 98%)  I&O's Detail    14 May 2019 07:01  -  15 May 2019 07:00  --------------------------------------------------------  IN:    ns in tub fed  [id number]: 150 mL  Total IN: 150 mL    OUT:    Incontinent per Condom Catheter: 600 mL    Indwelling Catheter - Urethral: 800 mL  Total OUT: 1400 mL    Total NET: -1250 mL          General: NAD, resting comfortably in bed  C/V: NSR  Pulm: Nonlabored breathing, no respiratory distress  Abd: soft, NT/ND. PEG tube intact, no leak, no bleeding, on TF   Extrem: WWP, no edema, SCDs in place        LABS:                        14.5   13.63 )-----------( 271      ( 15 May 2019 07:39 )             42.6     05-15    141  |  104  |  27<H>  ----------------------------<  151<H>  3.7   |  26  |  0.85    Ca    9.1      15 May 2019 07:39  Mg     2.1     05-15    TPro  6.5  /  Alb  3.1<L>  /  TBili  0.3  /  DBili  <0.2  /  AST  51<H>  /  ALT  66<H>  /  AlkPhos  92  05-15          RADIOLOGY & ADDITIONAL STUDIES:

## 2019-05-15 NOTE — PROGRESS NOTE ADULT - SUBJECTIVE AND OBJECTIVE BOX
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 Pt initially presented  to [facility] in [place], [place] (where he was for work) on 5/1/19 with acute onset dizziness, gait ataxia (falling to R), found to have subacute/acute L frontal and R cerebellar strokes and R distal vertebral artery occlusion of unclear chronicity. TPA was pushed, subsequent CT neg for hemorrhage. Pt arrived on heparin gtt with NGT for dysphagia. Night of admission Pt spiked 101.2 rectal (met SIRS criteria for fever + leukocytosis) received 5 days of Vanco/Zosyn for presumed HAP/aspiration PNA then off when Procal/blood cx NGTD. Noted to have bloody oropharyngeal secretions from self-suctioning with Yoankere;  seen by ENT who saw minor trauma, given supportive care and red rubber suctioning.  Heparin was d/c’d then transitioned to Lovenox. Pt had intractable hiccups, refractory to baclofen/thoridazine/reglan but generally improving.  NGT re-placed and started on TF. Failed FEEs x2, PEG placed by Gen Surg (EGD demonstrating mild gastritis at fundus, no abnormalities in proximal duodenum. PEG placed, skin bumper @ 4cm). PT recommending Acute Rehab. Stable for s/d to [facility] for Auth.     Vital Signs Last 12 Hrs  T(F): 98.5 (05-14-19 @ 21:14), Max: 98.8 (05-14-19 @ 17:15)  HR: 70 (05-15-19 @ 00:49) (70 - 84)  BP: 170/85 (05-15-19 @ 00:49) (170/85 - 183/87)  BP(mean): 118 (05-15-19 @ 00:49) (115 - 124)  RR: 18 (05-15-19 @ 00:49) (14 - 18)  SpO2: 92% (05-15-19 @ 00:49) (92% - 98%)  I&O's Summary    13 May 2019 07:01  -  14 May 2019 07:00  --------------------------------------------------------  IN: 1320 mL / OUT: 550 mL / NET: 770 mL    14 May 2019 07:01  -  15 May 2019 01:27  --------------------------------------------------------  IN: 150 mL / OUT: 1400 mL / NET: -1250 mL        PHYSICAL EXAM:  Constitutional: NAD, comfortable in bed.  HEENT: NC/AT, PERRLA, EOMI, no conjunctival pallor or scleral icterus, MMM  Neck: Supple, no JVD  Respiratory: Normal rate, rhythm, depth, effort. CTAB. No w/r/r.   Cardiovascular: RRR, normal S1 and S2, no m/r/g.   Gastrointestinal: +BS, soft NTND, no guarding or rebound tenderness, no palpable masses   Extremities: wwp; no cyanosis, clubbing or edema.   Vascular: Pulses equal and strong throughout.   Neurological: AAOx3, no CN deficits, strength and sensation intact throughout.   Skin: No gross skin abnormalities or rashes        LABS:                        15.6   15.55 )-----------( 269      ( 14 May 2019 07:32 )             47.1     05-14    141  |  106  |  28<H>  ----------------------------<  123<H>  4.1   |  26  |  0.82    Ca    8.9      14 May 2019 07:32  Mg     2.2     05-14      PT/INR - ( 13 May 2019 07:19 )   PT: 15.3 sec;   INR: 1.34          PTT - ( 13 May 2019 07:19 )  PTT:30.6 sec      RADIOLOGY & ADDITIONAL TESTS:    MEDICATIONS  (STANDING):  amLODIPine   Tablet 10 milliGRAM(s) Oral every 24 hours  aspirin  chewable 81 milliGRAM(s) Oral every 24 hours  atorvastatin 80 milliGRAM(s) Oral at bedtime  clopidogrel Tablet 75 milliGRAM(s) Oral every 24 hours  FIRST- Mouthwash  BLM 15 milliLiter(s) Swish and Spit two times a day  gabapentin   Solution 300 milliGRAM(s) Oral two times a day  heparin  Injectable 7500 Unit(s) SubCutaneous every 8 hours  losartan 25 milliGRAM(s) Oral daily  melatonin 5 milliGRAM(s) Oral at bedtime  methocarbamol 1000 milliGRAM(s) Oral every 8 hours  metoclopramide Injectable 10 milliGRAM(s) IV Push every 6 hours  metoprolol tartrate 25 milliGRAM(s) Oral two times a day    MEDICATIONS  (PRN):  acetaminophen    Suspension .. 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 Pt initially presented  to [facility] in [place], [place] (where he was for work) on 5/1/19 with acute onset dizziness, gait ataxia (falling to R), found to have subacute/acute L frontal and R cerebellar strokes and R distal vertebral artery occlusion of unclear chronicity. TPA was pushed, subsequent CT neg for hemorrhage. Pt arrived on heparin gtt with NGT for dysphagia. Night of admission Pt spiked 101.2 rectal (met SIRS criteria for fever + leukocytosis) received 5 days of Vanco/Zosyn for presumed HAP/aspiration PNA then off when Procal/blood cx NGTD. Noted to have bloody oropharyngeal secretions from self-suctioning with Yoankeur;  seen by ENT who saw minor trauma, given supportive care and red rubber suctioning.  Heparin was d/c’d then transitioned to Lovenox. Pt had intractable hiccups, refractory to baclofen/thoridazine/reglan but generally improving.  NGT re-placed and started on TF. Failed FEEs x2, PEG placed by Gen Surg (EGD demonstrating mild gastritis at fundus, no abnormalities in proximal duodenum. PEG placed, skin bumper @ 4cm). PT recommending Acute Rehab. Stable for s/d to [facility] for Auth.     SUBJECTIVE: Patient seen and examined at bedside with nasal bipap. Still having secretions but no more hiccups. He reports his breathing is well when asked and nods head no to any pain.     Vital Signs Last 12 Hrs  T(F): 98.5 (05-14-19 @ 21:14), Max: 98.8 (05-14-19 @ 17:15)  HR: 70 (05-15-19 @ 00:49) (70 - 84)  BP: 170/85 (05-15-19 @ 00:49) (170/85 - 183/87)  BP(mean): 118 (05-15-19 @ 00:49) (115 - 124)  RR: 18 (05-15-19 @ 00:49) (14 - 18)  SpO2: 92% (05-15-19 @ 00:49) (92% - 98%)  I&O's Summary    13 May 2019 07:01  -  14 May 2019 07:00  --------------------------------------------------------  IN: 1320 mL / OUT: 550 mL / NET: 770 mL    14 May 2019 07:01  -  15 May 2019 01:27  --------------------------------------------------------  IN: 150 mL / OUT: 1400 mL / NET: -1250 mL        PHYSICAL EXAM:  Constitutional: NAD, comfortable in bed.  HEENT: NC/AT, PERRLA, EOMI, no conjunctival pallor or scleral icterus, MMM  Neck: Supple, no JVD  Respiratory: Normal rate, rhythm, depth, effort. CTAB. No w/r/r.   Cardiovascular: RRR, normal S1 and S2, no m/r/g.   Gastrointestinal: +BS, soft NTND, no guarding or rebound tenderness, no palpable masses   Extremities: wwp; no cyanosis, clubbing or edema.   Vascular: Pulses equal and strong throughout.   Neurological: AAOx3, no CN deficits, strength and sensation intact throughout.   Skin: No gross skin abnormalities or rashes        LABS:                        15.6   15.55 )-----------( 269      ( 14 May 2019 07:32 )             47.1     05-14    141  |  106  |  28<H>  ----------------------------<  123<H>  4.1   |  26  |  0.82    Ca    8.9      14 May 2019 07:32  Mg     2.2     05-14      PT/INR - ( 13 May 2019 07:19 )   PT: 15.3 sec;   INR: 1.34          PTT - ( 13 May 2019 07:19 )  PTT:30.6 sec      RADIOLOGY & ADDITIONAL TESTS:    MEDICATIONS  (STANDING):  amLODIPine   Tablet 10 milliGRAM(s) Oral every 24 hours  aspirin  chewable 81 milliGRAM(s) Oral every 24 hours  atorvastatin 80 milliGRAM(s) Oral at bedtime  clopidogrel Tablet 75 milliGRAM(s) Oral every 24 hours  FIRST- Mouthwash  BLM 15 milliLiter(s) Swish and Spit two times a day  gabapentin   Solution 300 milliGRAM(s) Oral two times a day  heparin  Injectable 7500 Unit(s) SubCutaneous every 8 hours  losartan 25 milliGRAM(s) Oral daily  melatonin 5 milliGRAM(s) Oral at bedtime  methocarbamol 1000 milliGRAM(s) Oral every 8 hours  metoclopramide Injectable 10 milliGRAM(s) IV Push every 6 hours  metoprolol tartrate 25 milliGRAM(s) Oral two times a day    MEDICATIONS  (PRN):  acetaminophen    Suspension .. 650 milliGRAM(s) Oral every 6 hours PRN Mild Pain (1 - 3), Moderate Pain (4 - 6)  chlorproMAZINE    Tablet 10 milliGRAM(s) Oral every 8 hours PRN Hiccups  sodium chloride 0.65% Nasal 1 Spray(s) Both Nostrils four times a day PRN Nasal Congestion

## 2019-05-16 ENCOUNTER — TRANSCRIPTION ENCOUNTER (OUTPATIENT)
Age: 54
End: 2019-05-16

## 2019-05-16 PROCEDURE — 99232 SBSQ HOSP IP/OBS MODERATE 35: CPT

## 2019-05-16 RX ORDER — ACETAMINOPHEN 500 MG
1000 TABLET ORAL ONCE
Refills: 0 | Status: COMPLETED | OUTPATIENT
Start: 2019-05-16 | End: 2019-05-16

## 2019-05-16 RX ADMIN — OXYCODONE AND ACETAMINOPHEN 1 TABLET(S): 5; 325 TABLET ORAL at 06:47

## 2019-05-16 RX ADMIN — GABAPENTIN 300 MILLIGRAM(S): 400 CAPSULE ORAL at 06:27

## 2019-05-16 RX ADMIN — HEPARIN SODIUM 7500 UNIT(S): 5000 INJECTION INTRAVENOUS; SUBCUTANEOUS at 06:28

## 2019-05-16 RX ADMIN — Medication 10 MILLIGRAM(S): at 22:25

## 2019-05-16 RX ADMIN — Medication 1 DROP(S): at 19:17

## 2019-05-16 RX ADMIN — HEPARIN SODIUM 7500 UNIT(S): 5000 INJECTION INTRAVENOUS; SUBCUTANEOUS at 21:57

## 2019-05-16 RX ADMIN — GABAPENTIN 300 MILLIGRAM(S): 400 CAPSULE ORAL at 17:34

## 2019-05-16 RX ADMIN — HEPARIN SODIUM 7500 UNIT(S): 5000 INJECTION INTRAVENOUS; SUBCUTANEOUS at 13:27

## 2019-05-16 RX ADMIN — METHOCARBAMOL 1000 MILLIGRAM(S): 500 TABLET, FILM COATED ORAL at 13:27

## 2019-05-16 RX ADMIN — Medication 5 MILLIGRAM(S): at 21:56

## 2019-05-16 RX ADMIN — Medication 400 MILLIGRAM(S): at 03:01

## 2019-05-16 RX ADMIN — DIPHENHYDRAMINE HYDROCHLORIDE AND LIDOCAINE HYDROCHLORIDE AND ALUMINUM HYDROXIDE AND MAGNESIUM HYDRO 15 MILLILITER(S): KIT at 06:29

## 2019-05-16 RX ADMIN — DIPHENHYDRAMINE HYDROCHLORIDE AND LIDOCAINE HYDROCHLORIDE AND ALUMINUM HYDROXIDE AND MAGNESIUM HYDRO 15 MILLILITER(S): KIT at 17:34

## 2019-05-16 RX ADMIN — Medication 25 MILLIGRAM(S): at 17:34

## 2019-05-16 RX ADMIN — OXYCODONE AND ACETAMINOPHEN 1 TABLET(S): 5; 325 TABLET ORAL at 22:25

## 2019-05-16 RX ADMIN — ATORVASTATIN CALCIUM 80 MILLIGRAM(S): 80 TABLET, FILM COATED ORAL at 21:56

## 2019-05-16 RX ADMIN — Medication 81 MILLIGRAM(S): at 12:09

## 2019-05-16 RX ADMIN — CLOPIDOGREL BISULFATE 75 MILLIGRAM(S): 75 TABLET, FILM COATED ORAL at 12:09

## 2019-05-16 RX ADMIN — METHOCARBAMOL 1000 MILLIGRAM(S): 500 TABLET, FILM COATED ORAL at 21:57

## 2019-05-16 RX ADMIN — AMLODIPINE BESYLATE 10 MILLIGRAM(S): 2.5 TABLET ORAL at 06:27

## 2019-05-16 RX ADMIN — Medication 650 MILLIGRAM(S): at 21:22

## 2019-05-16 RX ADMIN — METHOCARBAMOL 1000 MILLIGRAM(S): 500 TABLET, FILM COATED ORAL at 06:28

## 2019-05-16 RX ADMIN — Medication 1000 MILLIGRAM(S): at 03:20

## 2019-05-16 RX ADMIN — LOSARTAN POTASSIUM 25 MILLIGRAM(S): 100 TABLET, FILM COATED ORAL at 06:30

## 2019-05-16 RX ADMIN — OXYCODONE AND ACETAMINOPHEN 1 TABLET(S): 5; 325 TABLET ORAL at 17:00

## 2019-05-16 RX ADMIN — OXYCODONE AND ACETAMINOPHEN 1 TABLET(S): 5; 325 TABLET ORAL at 23:00

## 2019-05-16 RX ADMIN — Medication 25 MILLIGRAM(S): at 06:27

## 2019-05-16 RX ADMIN — OXYCODONE AND ACETAMINOPHEN 1 TABLET(S): 5; 325 TABLET ORAL at 16:01

## 2019-05-16 RX ADMIN — OXYCODONE AND ACETAMINOPHEN 1 TABLET(S): 5; 325 TABLET ORAL at 07:20

## 2019-05-16 RX ADMIN — Medication 650 MILLIGRAM(S): at 20:51

## 2019-05-16 NOTE — PROGRESS NOTE ADULT - SUBJECTIVE AND OBJECTIVE BOX
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 Following history obtained from Dr. Montez, Jan [phone] who cared for pt at [place]: TPA pushed at 4:11 am on 5/1/19. Initial CTH negative. CTP showing small focal area of decreased perfusion in L frontal area. CTA H/N showed R distal vertebral artery occlusion of unclear chronicity. CTH 24 hours later reportedly negative for bleed. Pt started on hep gtt, asa 81, lipitor, losartan, lopressor.  MRI showed subacute/acute L frontal and R cerebellar strokes. MRA negative. TTE showed LVH and LVEF 60%. Pt failed dysphagia screen, NGT placed for feeds and medications. Pt ambulated with PT. Pt noted to have hiccups for which he was on baclofen (per patient without relief). Of note pt has been suctioning his own secretions and tubing noted to have blood tinged secretions. (08 May 2019 20:12)                            14.5   13.63 )-----------( 271      ( 15 May 2019 07:39 )             42.6       05-15    141  |  104  |  27<H>  ----------------------------<  151<H>  3.7   |  26  |  0.85    Ca    9.1      15 May 2019 07:39  Mg     2.1     05-15    TPro  6.5  /  Alb  3.1<L>  /  TBili  0.3  /  DBili  <0.2  /  AST  51<H>  /  ALT  66<H>  /  AlkPhos  92  05-15    Vital Signs Last 24 Hrs  T(C): 36.8 (16 May 2019 16:00), Max: 36.9 (16 May 2019 05:31)  T(F): 98.3 (16 May 2019 16:00), Max: 98.4 (16 May 2019 05:31)  HR: 70 (16 May 2019 16:00) (66 - 75)  BP: 152/76 (16 May 2019 16:00) (147/73 - 167/86)  BP(mean): --  RR: 20 (16 May 2019 16:00) (14 - 20)  SpO2: 94% (16 May 2019 16:00) (93% - 98%)    MEDICATIONS  (STANDING):  amLODIPine   Tablet 10 milliGRAM(s) Oral every 24 hours  aspirin  chewable 81 milliGRAM(s) Oral every 24 hours  atorvastatin 80 milliGRAM(s) Oral at bedtime  clopidogrel Tablet 75 milliGRAM(s) Oral every 24 hours  FIRST- Mouthwash  BLM 15 milliLiter(s) Swish and Spit two times a day  gabapentin   Solution 300 milliGRAM(s) Oral two times a day  heparin  Injectable 7500 Unit(s) SubCutaneous every 8 hours  losartan 25 milliGRAM(s) Oral daily  melatonin 5 milliGRAM(s) Oral at bedtime  methocarbamol 1000 milliGRAM(s) Oral every 8 hours  metoprolol tartrate 25 milliGRAM(s) Oral two times a day    MEDICATIONS  (PRN):  acetaminophen    Suspension .. 650 milliGRAM(s) Oral every 6 hours PRN Mild Pain (1 - 3), Moderate Pain (4 - 6)  chlorproMAZINE    Tablet 10 milliGRAM(s) Oral every 8 hours PRN Hiccups  metoclopramide Injectable 10 milliGRAM(s) IV Push every 6 hours PRN Hiccups  oxyCODONE    5 mG/acetaminophen 325 mG 1 Tablet(s) Oral every 6 hours PRN Severe Pain (7 - 10)  sodium chloride 0.65% Nasal 1 Spray(s) Both Nostrils four times a day PRN Nasal Congestion    Currently Undergoing Physical Therapy at bedside.    Functional Status Assessment:    Pain Assessment    Pain Assessment/Number Scale (0-10) Adult  Presence of Pain: denies pain/discomfort  Pain Rating (0-10): Rest: 0   Pain Rating (0-10): Activity: 0     Cognitive/Neuro      Cognitive/Perceptual/Neuro  Cognitive/Neuro/Behavioral [WDL Definition: Alert; opens eyes spontaneously; arouses to voice or touch; oriented x 4; follows commands; speech spontaneous, logical; purposeful motor response; behavior appropriate to situation]: WDL    Therapeutic Interventions      Bed Mobility  Bed Mobility Training Scooting: contact guard;  1 person assist;  verbal cues;  nonverbal cues (demo/gestures)  Bed Mobility Training Sit-to-Supine: not performed secondary pt left OOB sitting in the chair.   Bed Mobility Training Supine-to-Sit: minimum assist (75% patient effort);  verbal cues;  nonverbal cues (demo/gestures);  stand-by assist  Bed Mobility Training Limitations: decreased ability to use arms for pushing/pulling;  decreased ability to use legs for bridging/pushing;  impaired ability to control trunk for mobility;  decreased strength;  impaired balance;  impaired postural control    Sit-Stand Transfer Training  Transfer Training Sit-to-Stand Transfer: moderate assist (50% patient effort);  2 person assist;  verbal cues;  nonverbal cues (demo/gestures);  weight-bearing as tolerated   rolling walker;  2 trials   Transfer Training Stand-to-Sit Transfer: moderate assist (50% patient effort);  2 person assist;  verbal cues;  nonverbal cues (demo/gestures);  weight-bearing as tolerated   rolling walker;  2 trials   Sit-to-Stand Transfer Training Transfer Safety Analysis: decreased balance;  decreased strength;  impaired balance;  impaired postural control;  impaired motor control;  impaired sensory feedback;  rolling walker    Gait Training  Gait Training: max/mod A x 2 with rolling walker for~ 25 feet in [address];  Pt then ambulated~25 feet with mod/min A x2 with rolling walker. in 2nd trial    weight-bearing as tolerated  Gait Analysis: 3-point gait   ataxic;  decreased imelda;  increased time in double stance;  decreased hip/knee flexion;  decreased step length;  decreased weight-shifting ability;  unsteady gait, no lose of balance, decreased heel strike and hip flexion, ataxic gait, +R listing. ;  decreased strength;  impaired balance;  impaired coordination;  rolling walker  Type of Rest Type of Rest: sitting  Duration of Rest Duration of Rest: 1 brief sitting break(1 min) secondary pt complains feeling tired.     Therapeutic Exercise  Therapeutic Exercise Detail: Seated ther ex: LAQ, hip flexion, ankle pump x 15 bilaterally.alternate tapping between the floor and the cup on L/R with RLEPicking up rounded napkin with R foot and put them in the cup to work on RLE coordination and eccentric control.CN Testing: Vision H-Test: bilateral tracking and smooth pursuit intact; Convergence/Divergence: intact; Vision Quadrant Test: intact bilaterally, but pt complains double vision when look to the right side with b/l eyes open.FNF: R dysmetria, L intact.             PM&R Impression: as above    Disposition Plan Recommendations: acute rehab/ accepted to [facility]

## 2019-05-16 NOTE — DISCHARGE NOTE PROVIDER - NSDCFUADDAPPT_GEN_ALL_CORE_FT
Please follow up with Dr. Coello on June 27th at 1:30pm Please follow up with Dr. Coello on June 27th at 1:30pm    Please follow up with your primary care physician.

## 2019-05-16 NOTE — DISCHARGE NOTE PROVIDER - CARE PROVIDER_API CALL
Marisa Coello)  Neurology; Vascular Neurology  [address], [address] [place] [address]  Phone: [phone]  Fax: [phone]  Follow Up Time:     Maldonado Issa)  [address] [address]  Phone: [phone]  Fax: [phone]  Follow Up Time:

## 2019-05-16 NOTE — PROGRESS NOTE ADULT - PROBLEM SELECTOR PLAN 1
Pt presented to [facility]) 5/1/19 with acute onset dizziness, gait ataxia (falling to R), s/p tPA on 5/1, found to have subacute/acute L frontal and R cerebellar strokes and R distal vertebral artery occlusion of unclear chronicity, transferred to [facility] for further management.  - Initial CTH negative, CTH 24 hours later reportedly negative for bleed.   - MRI showed subacute/acute L frontal and R cerebellar strokes.   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) 5/1/19 with acute onset dizziness, gait ataxia (falling to R), s/p tPA on 5/1, found to have subacute/acute L frontal and R cerebellar strokes and R distal vertebral artery occlusion of unclear chronicity, transferred to [facility] for further management.  - Initial CTH negative, CTH 24 hours later reportedly negative for bleed.   - MRI showed subacute/acute L frontal and R cerebellar strokes.   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

## 2019-05-16 NOTE — PROGRESS NOTE ADULT - PROBLEM SELECTOR PLAN 3
Pt noted to have dysphagia, failed dysphagia screen, arrived with NGT from [facility]. Etiology likely 2/2 stroke  - FEEs failed x2  - S/p PEG placed by GS on 5/13, currently tolerated TF with Jevity 1.2 which are given during the day and held at night

## 2019-05-16 NOTE — DISCHARGE NOTE PROVIDER - HOSPITAL COURSE
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. Pt initially presented  to [facility] in [place], [place] (where he was for work) on 5/1/19 with acute onset dizziness, gait ataxia (falling to R), found to have subacute/acute L frontal and R cerebellar strokes and R distal vertebral artery occlusion of unclear chronicity. TPA was pushed, subsequent CT neg for hemorrhage. Pt arrived on heparin gtt with NGT for dysphagia. Night of admission Pt spiked 101.2 rectal (met SIRS criteria for fever + leukocytosis) complete course of Vanco/Zosyn for HAP/aspiration PNA then off when Procal/blood cx NGTD. Noted to have bloody oropharyngeal secretions from self-suctioning with Yan[place]ur;  seen by ENT who saw minor trauma, given supportive care and red rubber suctioning.  Heparin was d/c’d then transitioned to Lovenox. Pt had intractable hiccups, improving on baclofen/thoridazine/reglan.  NGT re-placed and started on TF. Failed FEEs x2, PEG placed by Gen Surg (EGD demonstrating mild gastritis at fundus, no abnormalities in proximal duodenum. PEG placed, skin bumper @ 4cm). Given thromboembolic nature of stroke but no known cause or history of Afib patient had loop recorder placed on 5/15 to determine future need for further AC.  Patient is hemodynamically stable for discharge to acute rehab. 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 Pt initially presented  to [facility] in [place], [place] (where he was for work) on 5/1/19 with acute onset dizziness, gait ataxia (falling to R), found to have subacute/acute L frontal and R cerebellar strokes and R distal vertebral artery occlusion of unclear chronicity. TPA was pushed, subsequent CT neg for hemorrhage. Pt arrived on heparin gtt with NGT for dysphagia. Night of admission Pt spiked 101.2 rectal (met SIRS criteria for fever + leukocytosis) complete course of Vanco/Zosyn for HAP/aspiration PNA then off when Procal/blood cx NGTD. Noted to have bloody oropharyngeal secretions from self-suctioning with Yankeur;  seen by ENT who saw minor trauma, given supportive care and red rubber suctioning.  Heparin was d/c’d then transitioned to Lovenox. Pt had intractable hiccups, improving on baclofen/thoridazine/reglan.  NGT re-placed and started on TF. Failed FEEs x2, PEG placed by Gen Surg (EGD demonstrating mild gastritis at fundus, no abnormalities in proximal duodenum. PEG placed, skin bumper @ 4cm). Given thromboembolic nature of stroke but no known cause or history of Afib patient had loop recorder placed on 5/15 to determine future need for further AC as an outpatient.  Patient is hemodynamically stable for discharge to acute rehab.

## 2019-05-16 NOTE — PROGRESS NOTE ADULT - ATTENDING COMMENTS
Patient seen and examined.  Agree with above.  Patient still c/o ataxia on right side but better than when he was in [place].    He still has extra secretions but supposedly was able to swallow better on Saturday.  s/p PEG today.  He feels general weakness since hasn't eaten since yesterday.  c/o of back pain - on pain meds    Plan:   1) Secondary stroke prevention -   a) He had been on heparin drip and Aspirin with transition to Lovenox at therapeutic dose.  Restart once cleared by Surgery.  b) Continue Atorvastatin 80mg for statin.  LDL 57    2)_Stroke workup - Loop recorder placement outstanding    3) Stroke risk factors -   a) HTN - 140-180 - now on Lisinopril and Amlodipine  b) Hyperlipidemia - see above  c) Prediabetes - hemoglobin A1C 6.0%  d) CAD s/p NSTEMI - Aspirin for cardiac reasons  e) OLIVA on CPAP    4) GI - PEG feeds as per Surgery schedule  5) DVT prophylaxis - anticoagulation and SCDs  6) Dispo - acute rehab when workup complete and PEG in use
Patient seen and examined.  Agree with above.  Feeling better overall.  Abdominal pain better than yesterday.  PEG Wound clean as per Nurse and Resident.      Plan:   1) Secondary stroke prevention -   a) Clarify anticoagulation versus antiplatelet for treatment of his stroke once cleared by Surgery.  Note: He will need to remain on Aspirin for cardiac reasons.    b) Continue Atorvastatin 80mg for statin.  LDL 57    2)_Stroke workup - Loop recorder placement pending; EP aware    3) Stroke risk factors -   a) HTN - 150-170's - needs better control - increase Amlodipine and start Losartan 25mg.  Note: Discontinued Lisinopril due to cough  b) Hyperlipidemia - see above  c) Prediabetes - hemoglobin A1C 6.0%  d) CAD s/p NSTEMI - Aspirin for cardiac reasons  e) OLIVA on CPAP    4) GI - PEG feeds to be started today, as per Surgery schedule  5) DVT prophylaxis - Heparin and SCDs  6) Dispo - acute rehab when workup complete and PEG in use
Patient seen and examined.  Agree with above.  No acute complaints.  He was able to walk yesterday with PT/OT.  Tolerating PEG feeds.    Plan:   1) Secondary stroke prevention -   a) Continue Aspirin 81mg and Plavix 75mg for antiplatelet.        b) Continue Atorvastatin 80mg for statin.  LDL 57    2)_Stroke workup - completed    3) Stroke risk factors -   a) HTN - 140-150's - better control - continue Amlodipine and Losartan.  Note: Discontinued Lisinopril due to cough  b) Hyperlipidemia - see above  c) Prediabetes - hemoglobin A1C 6.0%  d) CAD s/p NSTEMI - Aspirin for cardiac reasons  e) OLIVA on CPAP    4) GI - PEG feeds  5) DVT prophylaxis - Heparin and SCDs  6) Dispo - acute rehab when bed available
Patient seen and examined.  Agree with above.  No acute complaints.  He was able to walk yesterday with PT/OT.  Tolerating PEG feeds.  Had loop recorder placed today.    Plan:   1) Secondary stroke prevention -   a) Discussed anticoagulation versus antiplatelet with Dr. Sharpe.  Even though he has two areas of stroke, frontal and cerebellar, there is no evidence of atrial fibrillation on cardiac monitoring.  Therefore, will use dual antiplatelet with reconsideration depending on results of loop recorder reading.      b) Continue Atorvastatin 80mg for statin.  LDL 57    2)_Stroke workup - completed    3) Stroke risk factors -   a) HTN - 140-180's - needs better control - continue Amlodipine and increase Losartan.  Note: Discontinued Lisinopril due to cough  b) Hyperlipidemia - see above  c) Prediabetes - hemoglobin A1C 6.0%  d) CAD s/p NSTEMI - Aspirin for cardiac reasons  e) OLIVA on CPAP    4) GI - PEG feeds  5) DVT prophylaxis - Heparin and SCDs  6) Dispo - acute rehab when bed available

## 2019-05-16 NOTE — PROGRESS NOTE ADULT - SUBJECTIVE AND OBJECTIVE BOX
**INCOMPLETE NOTE    OVERNIGHT EVENTS:  Patient with R shoulder, back and chest wall pain    SUBJECTIVE:  Patient seen and examined at bedside.  Patient continues to have some musculoskeletal R chest wall, shoulder and back pain that improves with Tylenol.  Reports being eager for rehab and continued PT progress.  Denies fever, chills, N/V, abdominal pain, SOB.    Vital Signs Last 12 Hrs  T(F): 98.2 (05-16-19 @ 10:19), Max: 98.4 (05-16-19 @ 05:31)  HR: 66 (05-16-19 @ 10:19) (66 - 75)  BP: 147/73 (05-16-19 @ 10:19) (147/73 - 152/90)  BP(mean): --  RR: 19 (05-16-19 @ 10:19) (18 - 20)  SpO2: 94% (05-16-19 @ 10:19) (94% - 98%)  I&O's Summary      PHYSICAL EXAM:  Constitutional: NAD, comfortable in bed.  HEENT: NC/AT, PERRLA, EOMI, no conjunctival pallor or scleral icterus, MMM  Neck: Supple, no JVD  Respiratory: CTA B/L. No w/r/r.   Cardiovascular: RRR, normal S1 and S2, no m/r/g.   Gastrointestinal: Overwieght, +BS, soft NTND, no guarding or rebound tenderness, no palpable masses.  PEG in place with site clean dry and intact  Extremities: wwp; no cyanosis, clubbing or edema.   Vascular: Pulses equal and strong throughout.   Neurological: AAOx3, no CN deficits, strength and sensation intact throughout. Reports some improvement in swallowing but minimal  Skin: No gross skin abnormalities or rashes        LABS:                        14.5   13.63 )-----------( 271      ( 15 May 2019 07:39 )             42.6     05-15    141  |  104  |  27<H>  ----------------------------<  151<H>  3.7   |  26  |  0.85    Ca    9.1      15 May 2019 07:39  Mg     2.1     05-15    TPro  6.5  /  Alb  3.1<L>  /  TBili  0.3  /  DBili  <0.2  /  AST  51<H>  /  ALT  66<H>  /  AlkPhos  92  05-15            RADIOLOGY & ADDITIONAL TESTS:    MEDICATIONS  (STANDING):  amLODIPine   Tablet 10 milliGRAM(s) Oral every 24 hours  aspirin  chewable 81 milliGRAM(s) Oral every 24 hours  atorvastatin 80 milliGRAM(s) Oral at bedtime  clopidogrel Tablet 75 milliGRAM(s) Oral every 24 hours  FIRST- Mouthwash  BLM 15 milliLiter(s) Swish and Spit two times a day  gabapentin   Solution 300 milliGRAM(s) Oral two times a day  heparin  Injectable 7500 Unit(s) SubCutaneous every 8 hours  losartan 25 milliGRAM(s) Oral daily  melatonin 5 milliGRAM(s) Oral at bedtime  methocarbamol 1000 milliGRAM(s) Oral every 8 hours  metoprolol tartrate 25 milliGRAM(s) Oral two times a day    MEDICATIONS  (PRN):  acetaminophen    Suspension .. 650 milliGRAM(s) Oral every 6 hours PRN Mild Pain (1 - 3), Moderate Pain (4 - 6)  chlorproMAZINE    Tablet 10 milliGRAM(s) Oral every 8 hours PRN Hiccups  metoclopramide Injectable 10 milliGRAM(s) IV Push every 6 hours PRN Hiccups  oxyCODONE    5 mG/acetaminophen 325 mG 1 Tablet(s) Oral every 6 hours PRN Severe Pain (7 - 10)  sodium chloride 0.65% Nasal 1 Spray(s) Both Nostrils four times a day PRN Nasal Congestion OVERNIGHT EVENTS:  Patient with R shoulder, back and chest wall pain    SUBJECTIVE:  Patient seen and examined at bedside.  Patient continues to have some musculoskeletal R chest wall, shoulder and back pain that improves with Tylenol.  Reports being eager for rehab and continued PT progress.  Denies fever, chills, N/V, abdominal pain, SOB.    Vital Signs Last 12 Hrs  T(F): 98.2 (05-16-19 @ 10:19), Max: 98.4 (05-16-19 @ 05:31)  HR: 66 (05-16-19 @ 10:19) (66 - 75)  BP: 147/73 (05-16-19 @ 10:19) (147/73 - 152/90)  BP(mean): --  RR: 19 (05-16-19 @ 10:19) (18 - 20)  SpO2: 94% (05-16-19 @ 10:19) (94% - 98%)  I&O's Summary      PHYSICAL EXAM:  Constitutional: NAD, comfortable in bed.  HEENT: NC/AT, PERRLA, EOMI, no conjunctival pallor or scleral icterus, MMM  Neck: Supple, no JVD  Respiratory: CTA B/L. No w/r/r.   Cardiovascular: RRR, normal S1 and S2, no m/r/g.   Gastrointestinal: Overwieght, +BS, soft NTND, no guarding or rebound tenderness, no palpable masses.  PEG in place with site clean dry and intact  Extremities: wwp; no cyanosis, clubbing or edema.   Vascular: Pulses equal and strong throughout.   Neurological: AAOx3, no CN deficits, strength and sensation intact throughout. Reports some improvement in swallowing but minimal  Skin: No gross skin abnormalities or rashes        LABS:                        14.5   13.63 )-----------( 271      ( 15 May 2019 07:39 )             42.6     05-15    141  |  104  |  27<H>  ----------------------------<  151<H>  3.7   |  26  |  0.85    Ca    9.1      15 May 2019 07:39  Mg     2.1     05-15    TPro  6.5  /  Alb  3.1<L>  /  TBili  0.3  /  DBili  <0.2  /  AST  51<H>  /  ALT  66<H>  /  AlkPhos  92  05-15            RADIOLOGY & ADDITIONAL TESTS:    MEDICATIONS  (STANDING):  amLODIPine   Tablet 10 milliGRAM(s) Oral every 24 hours  aspirin  chewable 81 milliGRAM(s) Oral every 24 hours  atorvastatin 80 milliGRAM(s) Oral at bedtime  clopidogrel Tablet 75 milliGRAM(s) Oral every 24 hours  FIRST- Mouthwash  BLM 15 milliLiter(s) Swish and Spit two times a day  gabapentin   Solution 300 milliGRAM(s) Oral two times a day  heparin  Injectable 7500 Unit(s) SubCutaneous every 8 hours  losartan 25 milliGRAM(s) Oral daily  melatonin 5 milliGRAM(s) Oral at bedtime  methocarbamol 1000 milliGRAM(s) Oral every 8 hours  metoprolol tartrate 25 milliGRAM(s) Oral two times a day    MEDICATIONS  (PRN):  acetaminophen    Suspension .. 650 milliGRAM(s) Oral every 6 hours PRN Mild Pain (1 - 3), Moderate Pain (4 - 6)  chlorproMAZINE    Tablet 10 milliGRAM(s) Oral every 8 hours PRN Hiccups  metoclopramide Injectable 10 milliGRAM(s) IV Push every 6 hours PRN Hiccups  oxyCODONE    5 mG/acetaminophen 325 mG 1 Tablet(s) Oral every 6 hours PRN Severe Pain (7 - 10)  sodium chloride 0.65% Nasal 1 Spray(s) Both Nostrils four times a day PRN Nasal Congestion OVERNIGHT EVENTS:  Patient with R shoulder, back and chest wall pain    SUBJECTIVE:  Patient seen and examined at bedside.  Patient continues to have some musculoskeletal R chest wall, shoulder and back pain that improves with Tylenol.  Reports being eager for rehab and continued PT progress.  Denies fever, chills, N/V, abdominal pain, SOB.    Vital Signs Last 12 Hrs  T(F): 98.2 (05-16-19 @ 10:19), Max: 98.4 (05-16-19 @ 05:31)  HR: 66 (05-16-19 @ 10:19) (66 - 75)  BP: 147/73 (05-16-19 @ 10:19) (147/73 - 152/90)  RR: 19 (05-16-19 @ 10:19) (18 - 20)  SpO2: 94% (05-16-19 @ 10:19) (94% - 98%)  I&O's Summary      PHYSICAL EXAM:  Constitutional: NAD, comfortable in bed.  HEENT: NC/AT, PERRLA, EOMI, no conjunctival pallor or scleral icterus, MMM  Neck: Supple, no JVD  Respiratory: CTA B/L. No w/r/r.   Cardiovascular: RRR, normal S1 and S2, no m/r/g.   Gastrointestinal: Overwieght, +BS, soft NTND, no guarding or rebound tenderness, no palpable masses.  PEG in place with site clean dry and intact  Extremities: wwp; no cyanosis, clubbing or edema.   Vascular: Pulses equal and strong throughout.   Neurological: AAOx3, no CN deficits, strength and sensation intact throughout. Reports some improvement in swallowing but minimal  Skin: No gross skin abnormalities or rashes        LABS:                        14.5   13.63 )-----------( 271      ( 15 May 2019 07:39 )             42.6     05-15    141  |  104  |  27<H>  ----------------------------<  151<H>  3.7   |  26  |  0.85    Ca    9.1      15 May 2019 07:39  Mg     2.1     05-15    TPro  6.5  /  Alb  3.1<L>  /  TBili  0.3  /  DBili  <0.2  /  AST  51<H>  /  ALT  66<H>  /  AlkPhos  92  05-15            RADIOLOGY & ADDITIONAL TESTS:    MEDICATIONS  (STANDING):  amLODIPine   Tablet 10 milliGRAM(s) Oral every 24 hours  aspirin  chewable 81 milliGRAM(s) Oral every 24 hours  atorvastatin 80 milliGRAM(s) Oral at bedtime  clopidogrel Tablet 75 milliGRAM(s) Oral every 24 hours  FIRST- Mouthwash  BLM 15 milliLiter(s) Swish and Spit two times a day  gabapentin   Solution 300 milliGRAM(s) Oral two times a day  heparin  Injectable 7500 Unit(s) SubCutaneous every 8 hours  losartan 25 milliGRAM(s) Oral daily  melatonin 5 milliGRAM(s) Oral at bedtime  methocarbamol 1000 milliGRAM(s) Oral every 8 hours  metoprolol tartrate 25 milliGRAM(s) Oral two times a day    MEDICATIONS  (PRN):  acetaminophen    Suspension .. 650 milliGRAM(s) Oral every 6 hours PRN Mild Pain (1 - 3), Moderate Pain (4 - 6)  chlorproMAZINE    Tablet 10 milliGRAM(s) Oral every 8 hours PRN Hiccups  metoclopramide Injectable 10 milliGRAM(s) IV Push every 6 hours PRN Hiccups  oxyCODONE    5 mG/acetaminophen 325 mG 1 Tablet(s) Oral every 6 hours PRN Severe Pain (7 - 10)  sodium chloride 0.65% Nasal 1 Spray(s) Both Nostrils four times a day PRN Nasal Congestion

## 2019-05-16 NOTE — DISCHARGE NOTE PROVIDER - NSDCCPCAREPLAN_GEN_ALL_CORE_FT
PRINCIPAL DISCHARGE DIAGNOSIS  Diagnosis: Cerebrovascular accident (CVA), unspecified mechanism  Assessment and Plan of Treatment: You were admitted following acute dizziness and falling with R sided weakness.  You were diagnosed with a L frontal and R cerebellar stroke and R distal vertebral artery occlusion.  You received a medication to break up blood clots called tPA at [facility] where you where diagnosed with the stroke.  A repeat CT of your head did not show any bleeding and your strokes were seen on MRI.  MR angiogram was negative.  You were started on a medication, Plavix, in addition to your aspirin to prevent blood clots and additional strokes.  You should also continue to take your cholesterol lowering medication, Atorvastatin.  You had a loop recorder, a device to monitor your heart rhythm, placed to determine if you have any abnormal heart rhythm that could contribute to blood clots.  You are stable for discharge to a rehab facility for continued therapy.  Please continue taking your medications as prescribed and follow up with your primary care physician and neurologist as an outpatient.      SECONDARY DISCHARGE DIAGNOSES  Diagnosis: Hospital acquired PNA  Assessment and Plan of Treatment: While inpatient you developed an pneumonia and were treated with a course of appropriate antibiotics and your symptoms improved.    Diagnosis: Dysphagia, unspecified type  Assessment and Plan of Treatment: Following your stroke you had difficulty swallowing.  You were seen and evaluated by speech therapy and a feedind tube was placed.  Continue to follow up with speech therapy for further monitoring of your ability to swallow.    Diagnosis: Coronary artery disease involving native coronary artery of native heart without angina pectoris  Assessment and Plan of Treatment: You were previously diagnosed with coronary artery disease. This is a narrowing of the arteries in your heart. Please continue to take your medications as prescribed and follow up with your primary care physician and cardiologist for further monitoring.    Diagnosis: Hypertension, unspecified type  Assessment and Plan of Treatment: You were previously diagnosed with high blood pressure. Please continue on your blood pressure medications at this time and follow up with your primary care physician for further surveillance and management of your high blood pressure.  Your Lisinopril was discontinued due to cough and you should continue taking your new medication, Losartan.    Diagnosis: Hyperlipidemia, unspecified hyperlipidemia type  Assessment and Plan of Treatment: You were previously diagnosed with high cholesterol. Please continue on your home medications at this time and follow up with your primary care physician for further surveillance and management of your high cholesterol.    Diagnosis: Pre-diabetes  Assessment and Plan of Treatment: While inpatient your hemoglobin A1c, a measure of your blood sugar over a longer period of time, was measured as poorly controlled blood sugar can be a risk factor for stroke.  Your hemoglobin A1c is 6.0 which qualifies as Pre-Diabetes.  Please share this number with your primary care physician and continue to avoid foods high in sugar or carbohydrates.    Diagnosis: Obstructive sleep apnea  Assessment and Plan of Treatment: You were previously diagnosed with sleep apnea and should continue to use your CPAP machine. PRINCIPAL DISCHARGE DIAGNOSIS  Diagnosis: Cerebrovascular accident (CVA), unspecified mechanism  Assessment and Plan of Treatment: You were admitted following acute dizziness and falling with R sided weakness.  You were diagnosed with a L frontal and R cerebellar stroke and R distal vertebral artery occlusion.  You received a medication to break up blood clots called tPA at [facility] where you where diagnosed with the stroke.  A repeat CT of your head did not show any bleeding and your strokes were seen on MRI.  MR angiogram was negative.  You were started on a medication, Plavix, in addition to your aspirin to prevent blood clots and additional strokes.  You should also continue to take your cholesterol lowering medication, Atorvastatin.  You had a loop recorder, a device to monitor your heart rhythm, placed to determine if you have any abnormal heart rhythm that could contribute to blood clots.  You are stable for discharge to a rehab facility for continued therapy.  Please continue taking your medications as prescribed and follow up with your primary care physician and neurologist as an outpatient.      SECONDARY DISCHARGE DIAGNOSES  Diagnosis: Hospital acquired PNA  Assessment and Plan of Treatment: While inpatient you developed an pneumonia and were treated with a course of appropriate antibiotics and your symptoms improved.    Diagnosis: Dysphagia, unspecified type  Assessment and Plan of Treatment: Following your stroke you had difficulty swallowing.  You were seen and evaluated by speech therapy and a feedind tube was placed.  Continue to follow up with speech therapy for further monitoring of your ability to swallow.    Diagnosis: Coronary artery disease involving native coronary artery of native heart without angina pectoris  Assessment and Plan of Treatment: You were previously diagnosed with coronary artery disease. This is a narrowing of the arteries in your heart. Please continue to take your medications as prescribed and follow up with your primary care physician and cardiologist for further monitoring.    Diagnosis: Hypertension, unspecified type  Assessment and Plan of Treatment: You were previously diagnosed with high blood pressure. Please continue on your blood pressure medications at this time and follow up with your primary care physician for further surveillance and management of your high blood pressure.  Your Lisinopril was discontinued due to cough and you should continue taking your new medication, Losartan.    Diagnosis: Hyperlipidemia, unspecified hyperlipidemia type  Assessment and Plan of Treatment: You were previously diagnosed with high cholesterol. Please continue on your home medications at this time and follow up with your primary care physician for further surveillance and management of your high cholesterol.    Diagnosis: Pre-diabetes  Assessment and Plan of Treatment: While inpatient your hemoglobin A1c, a measure of your blood sugar over a longer period of time, was measured as poorly controlled blood sugar can be a risk factor for stroke.  Your hemoglobin A1c is 6.0 which qualifies as Pre-Diabetes.  Please share this number with your primary care physician and continue to avoid foods high in sugar or carbohydrates.    Diagnosis: Obstructive sleep apnea  Assessment and Plan of Treatment: You were previously diagnosed with sleep apnea and should continue to use your CPAP machine. PRINCIPAL DISCHARGE DIAGNOSIS  Diagnosis: Cerebrovascular accident (CVA), unspecified mechanism  Assessment and Plan of Treatment: You were admitted following acute dizziness and falling with R sided weakness.  You were diagnosed with a L frontal and R cerebellar stroke and R distal vertebral artery occlusion.  You received a medication to break up blood clots called tPA at [facility] where you where diagnosed with the stroke.  A repeat CT of your head did not show any bleeding and your strokes were seen on MRI.  MR angiogram was negative.  You were started on a medication, Plavix, in addition to your aspirin to prevent blood clots and additional strokes.  You should also continue to take your cholesterol lowering medication, Atorvastatin.  You had a loop recorder, a device to monitor your heart rhythm, placed to determine if you have any abnormal heart rhythm that could contribute to blood clots.  You are stable for discharge to a rehab facility for continued therapy.  Please continue taking your medications as prescribed and follow up with your primary care physician and neurologist as an outpatient.      SECONDARY DISCHARGE DIAGNOSES  Diagnosis: Hospital acquired PNA  Assessment and Plan of Treatment: While inpatient you developed an pneumonia and were treated with a course of appropriate antibiotics and your symptoms improved.    Diagnosis: Dysphagia, unspecified type  Assessment and Plan of Treatment: Following your stroke you had difficulty swallowing.  You were seen and evaluated by speech therapy and a feedind tube was placed.  Continue to follow up with speech therapy for further monitoring of your ability to swallow.    Diagnosis: Coronary artery disease involving native coronary artery of native heart without angina pectoris  Assessment and Plan of Treatment: You were previously diagnosed with coronary artery disease. This is a narrowing of the arteries in your heart. Please continue to take your medications as prescribed and follow up with your primary care physician and cardiologist for further monitoring.    Diagnosis: Hypertension, unspecified type  Assessment and Plan of Treatment: You were previously diagnosed with high blood pressure. Please continue on your blood pressure medications at this time and follow up with your primary care physician for further surveillance and management of your high blood pressure.  Your Lisinopril was discontinued due to cough and you should continue taking your new medication, Losartan.    Diagnosis: Hyperlipidemia, unspecified hyperlipidemia type  Assessment and Plan of Treatment: You were previously diagnosed with high cholesterol. Please continue on your home medications at this time and follow up with your primary care physician for further surveillance and management of your high cholesterol.    Diagnosis: Pre-diabetes  Assessment and Plan of Treatment: While inpatient your hemoglobin A1c, a measure of your blood sugar over a longer period of time, was measured as poorly controlled blood sugar can be a risk factor for stroke.  Your hemoglobin A1c is 6.0 which qualifies as Pre-Diabetes.  Please share this number with your primary care physician and continue to avoid foods high in sugar or carbohydrates.    Diagnosis: Obstructive sleep apnea  Assessment and Plan of Treatment: You were previously diagnosed with sleep apnea and should continue to use your CPAP machine.

## 2019-05-17 PROCEDURE — 99232 SBSQ HOSP IP/OBS MODERATE 35: CPT

## 2019-05-17 RX ORDER — METOPROLOL TARTRATE 50 MG
1 TABLET ORAL
Qty: 0 | Refills: 0 | DISCHARGE
Start: 2019-05-17

## 2019-05-17 RX ORDER — ACETAMINOPHEN 500 MG
20.31 TABLET ORAL
Qty: 0 | Refills: 0 | DISCHARGE
Start: 2019-05-17

## 2019-05-17 RX ORDER — AMLODIPINE BESYLATE 2.5 MG/1
1 TABLET ORAL
Qty: 0 | Refills: 0 | DISCHARGE
Start: 2019-05-17

## 2019-05-17 RX ORDER — CIPROFLOXACIN HCL 0.3 %
1 DROPS OPHTHALMIC (EYE) EVERY 4 HOURS
Refills: 0 | Status: DISCONTINUED | OUTPATIENT
Start: 2019-05-17 | End: 2019-05-21

## 2019-05-17 RX ORDER — HEPARIN SODIUM 5000 [USP'U]/ML
7500 INJECTION INTRAVENOUS; SUBCUTANEOUS
Qty: 0 | Refills: 0 | DISCHARGE
Start: 2019-05-17

## 2019-05-17 RX ORDER — LOSARTAN POTASSIUM 100 MG/1
1 TABLET, FILM COATED ORAL
Qty: 0 | Refills: 0 | DISCHARGE
Start: 2019-05-17

## 2019-05-17 RX ORDER — CIPROFLOXACIN HCL 0.3 %
1 DROPS OPHTHALMIC (EYE)
Qty: 0 | Refills: 0 | DISCHARGE
Start: 2019-05-17

## 2019-05-17 RX ORDER — LANOLIN ALCOHOL/MO/W.PET/CERES
1 CREAM (GRAM) TOPICAL
Qty: 0 | Refills: 0 | DISCHARGE
Start: 2019-05-17

## 2019-05-17 RX ORDER — CLOPIDOGREL BISULFATE 75 MG/1
1 TABLET, FILM COATED ORAL
Qty: 0 | Refills: 0 | DISCHARGE
Start: 2019-05-17

## 2019-05-17 RX ORDER — METHOCARBAMOL 500 MG/1
2 TABLET, FILM COATED ORAL
Qty: 0 | Refills: 0 | DISCHARGE
Start: 2019-05-17

## 2019-05-17 RX ORDER — CHLORPROMAZINE HCL 10 MG
1 TABLET ORAL
Qty: 0 | Refills: 0 | DISCHARGE
Start: 2019-05-17

## 2019-05-17 RX ORDER — ATORVASTATIN CALCIUM 80 MG/1
1 TABLET, FILM COATED ORAL
Qty: 0 | Refills: 0 | DISCHARGE
Start: 2019-05-17

## 2019-05-17 RX ORDER — GABAPENTIN 400 MG/1
6 CAPSULE ORAL
Qty: 0 | Refills: 0 | DISCHARGE
Start: 2019-05-17

## 2019-05-17 RX ORDER — ASPIRIN/CALCIUM CARB/MAGNESIUM 324 MG
1 TABLET ORAL
Qty: 0 | Refills: 0 | DISCHARGE
Start: 2019-05-17

## 2019-05-17 RX ADMIN — GABAPENTIN 300 MILLIGRAM(S): 400 CAPSULE ORAL at 17:36

## 2019-05-17 RX ADMIN — Medication 25 MILLIGRAM(S): at 06:19

## 2019-05-17 RX ADMIN — OXYCODONE AND ACETAMINOPHEN 1 TABLET(S): 5; 325 TABLET ORAL at 06:39

## 2019-05-17 RX ADMIN — Medication 81 MILLIGRAM(S): at 11:29

## 2019-05-17 RX ADMIN — OXYCODONE AND ACETAMINOPHEN 1 TABLET(S): 5; 325 TABLET ORAL at 16:37

## 2019-05-17 RX ADMIN — CLOPIDOGREL BISULFATE 75 MILLIGRAM(S): 75 TABLET, FILM COATED ORAL at 11:29

## 2019-05-17 RX ADMIN — Medication 650 MILLIGRAM(S): at 06:26

## 2019-05-17 RX ADMIN — DIPHENHYDRAMINE HYDROCHLORIDE AND LIDOCAINE HYDROCHLORIDE AND ALUMINUM HYDROXIDE AND MAGNESIUM HYDRO 15 MILLILITER(S): KIT at 17:37

## 2019-05-17 RX ADMIN — GABAPENTIN 300 MILLIGRAM(S): 400 CAPSULE ORAL at 06:20

## 2019-05-17 RX ADMIN — HEPARIN SODIUM 7500 UNIT(S): 5000 INJECTION INTRAVENOUS; SUBCUTANEOUS at 21:40

## 2019-05-17 RX ADMIN — METHOCARBAMOL 1000 MILLIGRAM(S): 500 TABLET, FILM COATED ORAL at 06:19

## 2019-05-17 RX ADMIN — METHOCARBAMOL 1000 MILLIGRAM(S): 500 TABLET, FILM COATED ORAL at 21:23

## 2019-05-17 RX ADMIN — LOSARTAN POTASSIUM 25 MILLIGRAM(S): 100 TABLET, FILM COATED ORAL at 06:19

## 2019-05-17 RX ADMIN — Medication 5 MILLIGRAM(S): at 21:23

## 2019-05-17 RX ADMIN — DIPHENHYDRAMINE HYDROCHLORIDE AND LIDOCAINE HYDROCHLORIDE AND ALUMINUM HYDROXIDE AND MAGNESIUM HYDRO 15 MILLILITER(S): KIT at 06:20

## 2019-05-17 RX ADMIN — Medication 650 MILLIGRAM(S): at 07:11

## 2019-05-17 RX ADMIN — Medication 10 MILLIGRAM(S): at 21:22

## 2019-05-17 RX ADMIN — HEPARIN SODIUM 7500 UNIT(S): 5000 INJECTION INTRAVENOUS; SUBCUTANEOUS at 06:19

## 2019-05-17 RX ADMIN — OXYCODONE AND ACETAMINOPHEN 1 TABLET(S): 5; 325 TABLET ORAL at 07:11

## 2019-05-17 RX ADMIN — Medication 10 MILLIGRAM(S): at 19:47

## 2019-05-17 RX ADMIN — Medication 25 MILLIGRAM(S): at 17:37

## 2019-05-17 RX ADMIN — Medication 1 DROP(S): at 17:36

## 2019-05-17 RX ADMIN — OXYCODONE AND ACETAMINOPHEN 1 TABLET(S): 5; 325 TABLET ORAL at 22:04

## 2019-05-17 RX ADMIN — Medication 1 DROP(S): at 21:38

## 2019-05-17 RX ADMIN — METHOCARBAMOL 1000 MILLIGRAM(S): 500 TABLET, FILM COATED ORAL at 14:20

## 2019-05-17 RX ADMIN — HEPARIN SODIUM 7500 UNIT(S): 5000 INJECTION INTRAVENOUS; SUBCUTANEOUS at 14:20

## 2019-05-17 RX ADMIN — Medication 1 DROP(S): at 06:21

## 2019-05-17 RX ADMIN — ATORVASTATIN CALCIUM 80 MILLIGRAM(S): 80 TABLET, FILM COATED ORAL at 21:59

## 2019-05-17 RX ADMIN — AMLODIPINE BESYLATE 10 MILLIGRAM(S): 2.5 TABLET ORAL at 06:19

## 2019-05-17 RX ADMIN — OXYCODONE AND ACETAMINOPHEN 1 TABLET(S): 5; 325 TABLET ORAL at 16:07

## 2019-05-17 RX ADMIN — Medication 1 DROP(S): at 15:03

## 2019-05-17 NOTE — PROGRESS NOTE ADULT - PROBLEM SELECTOR PLAN 3
Pt noted to have dysphagia, failed dysphagia screen, arrived with NGT from [facility]. Etiology likely 2/2 stroke  - FEEs failed x2  - S/p PEG placed by [facility] on 5/13, currently tolerated TF with Jevity 1.2 which are given during the day and held at night

## 2019-05-17 NOTE — CHART NOTE - NSCHARTNOTEFT_GEN_A_CORE
Admitting Diagnosis:   Patient is a 54y old  Male who presents with a chief complaint of CVA (17 May 2019 13:51)      PAST MEDICAL & SURGICAL HISTORY:  S/P coronary artery stent placement  H/O non-ST elevation myocardial infarction (NSTEMI)  Pre-diabetes  Thoracic aortic aneurysm without rupture  Hyperlipidemia  Obstructive sleep apnea  HTN (hypertension)  History of aortic aneurysm repair  Gynecomastia, male  H/O arthroscopy of left knee      Current Nutrition Order:   Jevity 1.2 Donny @ 75mL/hr x 24hrs via PEG. Provides: 1800mL TV, 2160kcal, 100g pro, 1453mL free H2O, 180% RDI, 1.24g/kg IBW protein.    GI Issues: no noted n/v/d/c    Pain: no pain noted     Skin Integrity: +PEG     Labs: no new labs since 5/15       CAPILLARY BLOOD GLUCOSE      Medications:  MEDICATIONS  (STANDING):  amLODIPine   Tablet 10 milliGRAM(s) Oral every 24 hours  artificial tears (preservative free) Ophthalmic Solution 1 Drop(s) Both EYES two times a day  aspirin  chewable 81 milliGRAM(s) Oral every 24 hours  atorvastatin 80 milliGRAM(s) Oral at bedtime  ciprofloxacin  0.3% Ophthalmic Solution 1 Drop(s) Both EYES every 4 hours  clopidogrel Tablet 75 milliGRAM(s) Oral every 24 hours  FIRST- Mouthwash  BLM 15 milliLiter(s) Swish and Spit two times a day  gabapentin   Solution 300 milliGRAM(s) Oral two times a day  heparin  Injectable 7500 Unit(s) SubCutaneous every 8 hours  losartan 25 milliGRAM(s) Oral daily  melatonin 5 milliGRAM(s) Oral at bedtime  methocarbamol 1000 milliGRAM(s) Oral every 8 hours  metoprolol tartrate 25 milliGRAM(s) Oral two times a day    MEDICATIONS  (PRN):  acetaminophen    Suspension .. 650 milliGRAM(s) Oral every 6 hours PRN Mild Pain (1 - 3), Moderate Pain (4 - 6)  chlorproMAZINE    Tablet 10 milliGRAM(s) Oral every 8 hours PRN Hiccups  metoclopramide Injectable 10 milliGRAM(s) IV Push every 6 hours PRN Hiccups  oxyCODONE    5 mG/acetaminophen 325 mG 1 Tablet(s) Oral every 6 hours PRN Severe Pain (7 - 10)  sodium chloride 0.65% Nasal 1 Spray(s) Both Nostrils four times a day PRN Nasal Congestion      Weight: 122kg     Weight Change: none     Nutrition Focused Physical Exam: Completed [   ]  Not Pertinent [ x  ]    Estimated energy needs:   Ideal body weight (80.7kg) used for calculations as pt >120% of IBW. Needs estimated for maintenance in adults; increased protein for s/p CVA  Calories: 25-30 kcal/kg = 8531-2836 kcal/day  Protein: 1.0-1.2 g/kg = 81-97g protein/day  Fluids: 25-30 mL/kg = 2749-9357 mL/day    Subjective:   55 yo/male with PMHx NSTEMI, CAD, aortic root aneurysm repair, r/o stroke/TIA, HTN, OLIVA, presented to OSH while on work trip with dizziness, gait ataxia, and nausea. S/p tPA push. MRI showing subacute/acute L. frontal and R. cerebellar strokes. TTE +LVH and R. distal artery occlusion. Pt transferred to [facility] for further monitoring. Pt failed FEES twice, now s/p PEG placement on 5/13. Now s/p loop recorder 5/15, moved to [facility], communication improving, wife at bedside, EN running at 75ml/hr. Now pending rehab, continues to work with PT/OT. Recommend transition to bolus feeds so pt able to ambulate during day/ has more freedom, additionally recommend f/u SLP eval to assess PO intake. See recs below, will continue to follow per protocol.     Previous Nutrition Diagnosis: Increased protein needs RT increased demand for protein intake AEB s/p CVA    Active [x   ]  Resolved [   ]    If resolved, new PES:     Goal: Pt will meet % of EER per day via tolerated route     Recommendations:  1. C/w Jevity 1.2 @75ml/hr. Monitor for s/s intolerance; maintain aspiration precautions at all times   2. Monitor lytes and replete prn  3. Weekly weights   4. F/u SLP eval   5. Recommend transition to bolus feeds prior to DC/ while at rehab. Recommend Jevity 1.2 x7 cans/ day (1995kcal, 92g pro, 1337ml h2o) recommend 250ml h2o q6hrs to meet fluid needs and maintain tube patency. May provide cans as tolerated or 2 cans TID, 1 HS (breakfast, lunch, dinner, + before bed)     Paged team     Education: discussed EN    Risk Level: High [  x ] Moderate [   ] Low [   ]

## 2019-05-17 NOTE — PROGRESS NOTE ADULT - SUBJECTIVE AND OBJECTIVE BOX
**INCOMPLETE NOTE    OVERNIGHT EVENTS:    SUBJECTIVE:  Patient seen and examined at bedside.    Vital Signs Last 12 Hrs  T(F): 98.1 (05-17-19 @ 11:49), Max: 98.5 (05-17-19 @ 05:04)  HR: 69 (05-17-19 @ 11:49) (63 - 69)  BP: 148/80 (05-17-19 @ 11:49) (148/80 - 160/90)  BP(mean): --  RR: 18 (05-17-19 @ 11:49) (18 - 20)  SpO2: 94% (05-17-19 @ 11:49) (94% - 95%)  I&O's Summary    16 May 2019 07:01  -  17 May 2019 07:00  --------------------------------------------------------  IN: 0 mL / OUT: 300 mL / NET: -300 mL        PHYSICAL EXAM:  Constitutional: NAD, comfortable in bed.  HEENT: NC/AT, PERRLA, EOMI, no conjunctival pallor or scleral icterus, MMM  Neck: Supple, no JVD  Respiratory: CTA B/L. No w/r/r.   Cardiovascular: RRR, normal S1 and S2, no m/r/g.   Gastrointestinal: +BS, soft NTND, no guarding or rebound tenderness, no palpable masses   Extremities: wwp; no cyanosis, clubbing or edema.   Vascular: Pulses equal and strong throughout.   Neurological: AAOx3, no CN deficits, strength and sensation intact throughout.   Skin: No gross skin abnormalities or rashes        LABS:                  RADIOLOGY & ADDITIONAL TESTS:    MEDICATIONS  (STANDING):  amLODIPine   Tablet 10 milliGRAM(s) Oral every 24 hours  artificial tears (preservative free) Ophthalmic Solution 1 Drop(s) Both EYES two times a day  aspirin  chewable 81 milliGRAM(s) Oral every 24 hours  atorvastatin 80 milliGRAM(s) Oral at bedtime  ciprofloxacin  0.3% Ophthalmic Solution 1 Drop(s) Both EYES every 4 hours  clopidogrel Tablet 75 milliGRAM(s) Oral every 24 hours  FIRST- Mouthwash  BLM 15 milliLiter(s) Swish and Spit two times a day  gabapentin   Solution 300 milliGRAM(s) Oral two times a day  heparin  Injectable 7500 Unit(s) SubCutaneous every 8 hours  losartan 25 milliGRAM(s) Oral daily  melatonin 5 milliGRAM(s) Oral at bedtime  methocarbamol 1000 milliGRAM(s) Oral every 8 hours  metoprolol tartrate 25 milliGRAM(s) Oral two times a day    MEDICATIONS  (PRN):  acetaminophen    Suspension .. 650 milliGRAM(s) Oral every 6 hours PRN Mild Pain (1 - 3), Moderate Pain (4 - 6)  chlorproMAZINE    Tablet 10 milliGRAM(s) Oral every 8 hours PRN Hiccups  metoclopramide Injectable 10 milliGRAM(s) IV Push every 6 hours PRN Hiccups  oxyCODONE    5 mG/acetaminophen 325 mG 1 Tablet(s) Oral every 6 hours PRN Severe Pain (7 - 10)  sodium chloride 0.65% Nasal 1 Spray(s) Both Nostrils four times a day PRN Nasal Congestion **INCOMPLETE NOTE    OVERNIGHT EVENTS:  No acute events    SUBJECTIVE:  Patient seen and examined at bedside.  Patient reports some continued chest pain.    Vital Signs Last 12 Hrs  T(F): 98.1 (05-17-19 @ 11:49), Max: 98.5 (05-17-19 @ 05:04)  HR: 69 (05-17-19 @ 11:49) (63 - 69)  BP: 148/80 (05-17-19 @ 11:49) (148/80 - 160/90)  BP(mean): --  RR: 18 (05-17-19 @ 11:49) (18 - 20)  SpO2: 94% (05-17-19 @ 11:49) (94% - 95%)  I&O's Summary    16 May 2019 07:01  -  17 May 2019 07:00  --------------------------------------------------------  IN: 0 mL / OUT: 300 mL / NET: -300 mL        PHYSICAL EXAM:  Constitutional: NAD, comfortable in bed.  HEENT: NC/AT, PERRLA, EOMI, no conjunctival pallor or scleral icterus, MMM  Neck: Supple, no JVD  Respiratory: CTA B/L. No w/r/r.   Cardiovascular: RRR, normal S1 and S2, no m/r/g.   Gastrointestinal: +BS, soft NTND, no guarding or rebound tenderness, no palpable masses   Extremities: wwp; no cyanosis, clubbing or edema.   Vascular: Pulses equal and strong throughout.   Neurological: AAOx3, no CN deficits, strength and sensation intact throughout.   Skin: No gross skin abnormalities or rashes        LABS:                  RADIOLOGY & ADDITIONAL TESTS:    MEDICATIONS  (STANDING):  amLODIPine   Tablet 10 milliGRAM(s) Oral every 24 hours  artificial tears (preservative free) Ophthalmic Solution 1 Drop(s) Both EYES two times a day  aspirin  chewable 81 milliGRAM(s) Oral every 24 hours  atorvastatin 80 milliGRAM(s) Oral at bedtime  ciprofloxacin  0.3% Ophthalmic Solution 1 Drop(s) Both EYES every 4 hours  clopidogrel Tablet 75 milliGRAM(s) Oral every 24 hours  FIRST- Mouthwash  BLM 15 milliLiter(s) Swish and Spit two times a day  gabapentin   Solution 300 milliGRAM(s) Oral two times a day  heparin  Injectable 7500 Unit(s) SubCutaneous every 8 hours  losartan 25 milliGRAM(s) Oral daily  melatonin 5 milliGRAM(s) Oral at bedtime  methocarbamol 1000 milliGRAM(s) Oral every 8 hours  metoprolol tartrate 25 milliGRAM(s) Oral two times a day    MEDICATIONS  (PRN):  acetaminophen    Suspension .. 650 milliGRAM(s) Oral every 6 hours PRN Mild Pain (1 - 3), Moderate Pain (4 - 6)  chlorproMAZINE    Tablet 10 milliGRAM(s) Oral every 8 hours PRN Hiccups  metoclopramide Injectable 10 milliGRAM(s) IV Push every 6 hours PRN Hiccups  oxyCODONE    5 mG/acetaminophen 325 mG 1 Tablet(s) Oral every 6 hours PRN Severe Pain (7 - 10)  sodium chloride 0.65% Nasal 1 Spray(s) Both Nostrils four times a day PRN Nasal Congestion OVERNIGHT EVENTS:  No acute events    SUBJECTIVE:  Patient seen and examined at bedside.  Patient reports some continued R sided chest wall pain and R shoulder pain. Has had some blurred vision since admission but felt that it is increased today with increased R eye discharge this AM.    Vital Signs Last 12 Hrs  T(F): 98.1 (05-17-19 @ 11:49), Max: 98.5 (05-17-19 @ 05:04)  HR: 69 (05-17-19 @ 11:49) (63 - 69)  BP: 148/80 (05-17-19 @ 11:49) (148/80 - 160/90)  BP(mean): --  RR: 18 (05-17-19 @ 11:49) (18 - 20)  SpO2: 94% (05-17-19 @ 11:49) (94% - 95%)  I&O's Summary    16 May 2019 07:01  -  17 May 2019 07:00  --------------------------------------------------------  IN: 0 mL / OUT: 300 mL / NET: -300 mL        PHYSICAL EXAM:  Constitutional: NAD, comfortable in bed.  HEENT: NC/AT, PERRLA, EOMI, no conjunctival pallor or scleral icterus, MMM  Neck: Supple, no JVD  Respiratory: CTA B/L. No w/r/r.   Cardiovascular: RRR, normal S1 and S2, no m/r/g.   Gastrointestinal: Overwieght, +BS, soft NTND, no guarding or rebound tenderness, no palpable masses.  PEG in place with site clean dry and intact  Extremities: wwp; no cyanosis, clubbing or edema.   Vascular: Pulses equal and strong throughout.   Neurological: AAOx3, no CN deficits, strength and sensation intact throughout. 4+/5 R shoulder strength and some decreased coordination on R finger to nose.  Rest of strength 5/5 throughout  Skin: No gross skin abnormalities or rashes          LABS:  Now new labs        RADIOLOGY & ADDITIONAL TESTS:  No new imaging    MEDICATIONS  (STANDING):  amLODIPine   Tablet 10 milliGRAM(s) Oral every 24 hours  artificial tears (preservative free) Ophthalmic Solution 1 Drop(s) Both EYES two times a day  aspirin  chewable 81 milliGRAM(s) Oral every 24 hours  atorvastatin 80 milliGRAM(s) Oral at bedtime  ciprofloxacin  0.3% Ophthalmic Solution 1 Drop(s) Both EYES every 4 hours  clopidogrel Tablet 75 milliGRAM(s) Oral every 24 hours  FIRST- Mouthwash  BLM 15 milliLiter(s) Swish and Spit two times a day  gabapentin   Solution 300 milliGRAM(s) Oral two times a day  heparin  Injectable 7500 Unit(s) SubCutaneous every 8 hours  losartan 25 milliGRAM(s) Oral daily  melatonin 5 milliGRAM(s) Oral at bedtime  methocarbamol 1000 milliGRAM(s) Oral every 8 hours  metoprolol tartrate 25 milliGRAM(s) Oral two times a day    MEDICATIONS  (PRN):  acetaminophen    Suspension .. 650 milliGRAM(s) Oral every 6 hours PRN Mild Pain (1 - 3), Moderate Pain (4 - 6)  chlorproMAZINE    Tablet 10 milliGRAM(s) Oral every 8 hours PRN Hiccups  metoclopramide Injectable 10 milliGRAM(s) IV Push every 6 hours PRN Hiccups  oxyCODONE    5 mG/acetaminophen 325 mG 1 Tablet(s) Oral every 6 hours PRN Severe Pain (7 - 10)  sodium chloride 0.65% Nasal 1 Spray(s) Both Nostrils four times a day PRN Nasal Congestion

## 2019-05-17 NOTE — PROGRESS NOTE ADULT - PROBLEM SELECTOR PLAN 1
Pt presented to [facility]) 5/1/19 with acute onset dizziness, gait ataxia (falling to R), s/p tPA on 5/1, found to have subacute/acute L frontal and R cerebellar strokes and R distal vertebral artery occlusion of unclear chronicity, transferred to [facility] for further management.  - Initial CTH negative, CTH 24 hours later reportedly negative for bleed.   - MRI showed subacute/acute L frontal and R cerebellar strokes.   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

## 2019-05-18 PROCEDURE — 99233 SBSQ HOSP IP/OBS HIGH 50: CPT

## 2019-05-18 PROCEDURE — 93010 ELECTROCARDIOGRAM REPORT: CPT

## 2019-05-18 RX ORDER — METOCLOPRAMIDE HCL 10 MG
10 TABLET ORAL ONCE
Refills: 0 | Status: COMPLETED | OUTPATIENT
Start: 2019-05-18 | End: 2019-05-18

## 2019-05-18 RX ORDER — PREDNISOLONE SODIUM PHOSPHATE 1 %
1 DROPS OPHTHALMIC (EYE) THREE TIMES A DAY
Refills: 0 | Status: DISCONTINUED | OUTPATIENT
Start: 2019-05-18 | End: 2019-05-18

## 2019-05-18 RX ADMIN — Medication 1 DROP(S): at 09:44

## 2019-05-18 RX ADMIN — OXYCODONE AND ACETAMINOPHEN 1 TABLET(S): 5; 325 TABLET ORAL at 22:50

## 2019-05-18 RX ADMIN — LOSARTAN POTASSIUM 25 MILLIGRAM(S): 100 TABLET, FILM COATED ORAL at 05:21

## 2019-05-18 RX ADMIN — ATORVASTATIN CALCIUM 80 MILLIGRAM(S): 80 TABLET, FILM COATED ORAL at 22:18

## 2019-05-18 RX ADMIN — Medication 1 DROP(S): at 06:14

## 2019-05-18 RX ADMIN — DIPHENHYDRAMINE HYDROCHLORIDE AND LIDOCAINE HYDROCHLORIDE AND ALUMINUM HYDROXIDE AND MAGNESIUM HYDRO 15 MILLILITER(S): KIT at 18:02

## 2019-05-18 RX ADMIN — Medication 81 MILLIGRAM(S): at 11:31

## 2019-05-18 RX ADMIN — OXYCODONE AND ACETAMINOPHEN 1 TABLET(S): 5; 325 TABLET ORAL at 11:46

## 2019-05-18 RX ADMIN — AMLODIPINE BESYLATE 10 MILLIGRAM(S): 2.5 TABLET ORAL at 05:21

## 2019-05-18 RX ADMIN — GABAPENTIN 300 MILLIGRAM(S): 400 CAPSULE ORAL at 18:00

## 2019-05-18 RX ADMIN — GABAPENTIN 300 MILLIGRAM(S): 400 CAPSULE ORAL at 05:21

## 2019-05-18 RX ADMIN — Medication 1 DROP(S): at 20:08

## 2019-05-18 RX ADMIN — Medication 10 MILLIGRAM(S): at 18:00

## 2019-05-18 RX ADMIN — HEPARIN SODIUM 7500 UNIT(S): 5000 INJECTION INTRAVENOUS; SUBCUTANEOUS at 06:20

## 2019-05-18 RX ADMIN — CLOPIDOGREL BISULFATE 75 MILLIGRAM(S): 75 TABLET, FILM COATED ORAL at 11:31

## 2019-05-18 RX ADMIN — Medication 10 MILLIGRAM(S): at 13:51

## 2019-05-18 RX ADMIN — DIPHENHYDRAMINE HYDROCHLORIDE AND LIDOCAINE HYDROCHLORIDE AND ALUMINUM HYDROXIDE AND MAGNESIUM HYDRO 15 MILLILITER(S): KIT at 06:19

## 2019-05-18 RX ADMIN — OXYCODONE AND ACETAMINOPHEN 1 TABLET(S): 5; 325 TABLET ORAL at 05:20

## 2019-05-18 RX ADMIN — Medication 1 DROP(S): at 05:21

## 2019-05-18 RX ADMIN — Medication 5 MILLIGRAM(S): at 22:17

## 2019-05-18 RX ADMIN — Medication 25 MILLIGRAM(S): at 05:21

## 2019-05-18 RX ADMIN — METHOCARBAMOL 1000 MILLIGRAM(S): 500 TABLET, FILM COATED ORAL at 05:21

## 2019-05-18 RX ADMIN — OXYCODONE AND ACETAMINOPHEN 1 TABLET(S): 5; 325 TABLET ORAL at 06:20

## 2019-05-18 RX ADMIN — HEPARIN SODIUM 7500 UNIT(S): 5000 INJECTION INTRAVENOUS; SUBCUTANEOUS at 22:17

## 2019-05-18 RX ADMIN — METHOCARBAMOL 1000 MILLIGRAM(S): 500 TABLET, FILM COATED ORAL at 22:22

## 2019-05-18 RX ADMIN — Medication 1 DROP(S): at 14:41

## 2019-05-18 RX ADMIN — OXYCODONE AND ACETAMINOPHEN 1 TABLET(S): 5; 325 TABLET ORAL at 12:20

## 2019-05-18 RX ADMIN — Medication 25 MILLIGRAM(S): at 18:00

## 2019-05-18 RX ADMIN — HEPARIN SODIUM 7500 UNIT(S): 5000 INJECTION INTRAVENOUS; SUBCUTANEOUS at 14:40

## 2019-05-18 RX ADMIN — OXYCODONE AND ACETAMINOPHEN 1 TABLET(S): 5; 325 TABLET ORAL at 22:16

## 2019-05-18 RX ADMIN — METHOCARBAMOL 1000 MILLIGRAM(S): 500 TABLET, FILM COATED ORAL at 14:40

## 2019-05-18 RX ADMIN — OXYCODONE AND ACETAMINOPHEN 1 TABLET(S): 5; 325 TABLET ORAL at 00:00

## 2019-05-18 RX ADMIN — Medication 10 MILLIGRAM(S): at 20:17

## 2019-05-18 NOTE — PROGRESS NOTE ADULT - SUBJECTIVE AND OBJECTIVE BOX
=====================   STROKE ATTENDING NOTE  =====================     JL GRANADOS   MRN-[id number]  Summary:  54y/M PMH of CAD, NSTEMI s/p thrombectomy/FADI to mRCA (Oct 2017), aortic root aneurysm s/p repair 2016 (Dr Stevenson), was seen in ED for r/o stroke/TIA in Dec 2018 , HTN, OLIVA (CPAP nightly) presented to [facility] in [place], [place] (where he was for work) on 19 with acute onset dizziness, gait ataxia (falling to R), nausea. Following history obtained from Dr. Montez,  [phone] who cared for pt at [place]: TPA pushed at 4:11 am on 19. Initial CTH negative. CTP showing small focal area of decreased perfusion in L frontal area. CTA H/N showed R distal vertebral artery occlusion of unclear chronicity. CTH 24 hours later reportedly negative for bleed. Pt started on hep gtt, asa 81, lipitor, losartan, lopressor.  MRI showed subacute/acute L frontal and R cerebellar strokes. MRA negative. TTE showed LVH and LVEF 60%. Pt failed dysphagia screen, NGT placed for feeds and medications. Pt ambulated with PT. Pt noted to have hiccups for which he was on baclofen (per patient without relief). Of note pt has been suctioning his own secretions and tubing noted to have blood tinged secretions. (08 May 2019 20:12)    Presenting Symptoms: dizziness, ataxia, nausea    COURSE IN THE HOSPITAL   tPA given in OSH   transferred to [facility]    PMH:  S/P coronary artery stent placement H/O non-ST elevation myocardial infarction (NSTEMI) Pre-diabetes Thoracic aortic aneurysm without rupture Hyperlipidemia Obstructive sleep apnea HTN (hypertension) No pertinent past medical history  Allergies:  erythromycin (Unknown) tetanus toxoids (Fever)  Home meds:  acetaminophen amlodipine 10mg PO daily asa 81mg PO q24h atorvastatin 80mg PO HS chlorpromazine PRN cipro ophthalmic clopidogrel 75mg daily gabapentin BID heprarin 7500 q8h losartan 25mg PO daily melatonin 5mg PO HS methocarbamol 500 mg PO q8h metoprolol 25mg pO BID ocular lubricant      PHYSICAL EXAMINATION  T(C): 36.6 (-18 @ 13:05), Max: 36.8 (05-17 @ 21:00) HR: 66 (-18 @ 13:05) (62 - 71) BP: 136/80 (-18 @ 13:05) (136/80 - 157/82) RR: 18 (-18 @ 13:05) (14 - 20) SpO2: 93% (-18 @ 13:05) (93% - 97%)   NEUROLOGIC EXAMINATION:  Patient is awake, alert, fully oriented, pupils 2-3mm equal and briskly reactive to light, EOMs intact, R UE weakness 4/5, moves other extremities with good strength  GENERAL:  not intubated, not in cardiorespiratory distress  EENT: anicteric  CARDIOVASC:  (+) S1 S2, normal rate and regular rhythm  PULMONARY:  clear to auscultation bilaterally  ABDOMEN:  soft, nontender, with normoactive bowel sounds; PEG site clean intact  EXTREMITIES:  no edema  SKIN:  no rash    LABS:    Bacteriology:  CSF studies:  EEG:  Neuroimagin/09 CT head: acute / early subacute infarction R cerebellar; extensive intracranial atherosclerosis ant/post circulations  Other imaging:  Echo: EF 60%    MEDICATIONS: amlodipine 10mg PO q24h artifical tears ASA 81mg pO daily atorvastatin 80mg PO HS ciproopthalmic clopidogrel 75mg PO q24h mouthwash gabapentin 300mg PO BID heparin 7500 q8h losartan 25mg PO daily melatonin 5mg PO HS metoprolol 25mg pO BID chlorpromazine 10mg PO q8h PRN percocet PRN nasal spray    IV FLUIDS:  DRIPS:  DIET: Jevity to 75c/hr  Lines:    CODE STATUS:  full code                       GOALS OF CARE:  aggressive                      DISPOSITION:  7Wo

## 2019-05-19 PROCEDURE — 99232 SBSQ HOSP IP/OBS MODERATE 35: CPT

## 2019-05-19 RX ORDER — METOCLOPRAMIDE HCL 10 MG
10 TABLET ORAL EVERY 6 HOURS
Refills: 0 | Status: DISCONTINUED | OUTPATIENT
Start: 2019-05-19 | End: 2019-05-21

## 2019-05-19 RX ADMIN — Medication 1 DROP(S): at 18:44

## 2019-05-19 RX ADMIN — OXYCODONE AND ACETAMINOPHEN 1 TABLET(S): 5; 325 TABLET ORAL at 05:36

## 2019-05-19 RX ADMIN — LOSARTAN POTASSIUM 25 MILLIGRAM(S): 100 TABLET, FILM COATED ORAL at 06:47

## 2019-05-19 RX ADMIN — METHOCARBAMOL 1000 MILLIGRAM(S): 500 TABLET, FILM COATED ORAL at 14:14

## 2019-05-19 RX ADMIN — ATORVASTATIN CALCIUM 80 MILLIGRAM(S): 80 TABLET, FILM COATED ORAL at 22:12

## 2019-05-19 RX ADMIN — Medication 25 MILLIGRAM(S): at 18:44

## 2019-05-19 RX ADMIN — OXYCODONE AND ACETAMINOPHEN 1 TABLET(S): 5; 325 TABLET ORAL at 18:44

## 2019-05-19 RX ADMIN — DIPHENHYDRAMINE HYDROCHLORIDE AND LIDOCAINE HYDROCHLORIDE AND ALUMINUM HYDROXIDE AND MAGNESIUM HYDRO 15 MILLILITER(S): KIT at 06:50

## 2019-05-19 RX ADMIN — DIPHENHYDRAMINE HYDROCHLORIDE AND LIDOCAINE HYDROCHLORIDE AND ALUMINUM HYDROXIDE AND MAGNESIUM HYDRO 15 MILLILITER(S): KIT at 18:44

## 2019-05-19 RX ADMIN — HEPARIN SODIUM 7500 UNIT(S): 5000 INJECTION INTRAVENOUS; SUBCUTANEOUS at 06:46

## 2019-05-19 RX ADMIN — HEPARIN SODIUM 7500 UNIT(S): 5000 INJECTION INTRAVENOUS; SUBCUTANEOUS at 22:12

## 2019-05-19 RX ADMIN — HEPARIN SODIUM 7500 UNIT(S): 5000 INJECTION INTRAVENOUS; SUBCUTANEOUS at 14:14

## 2019-05-19 RX ADMIN — Medication 25 MILLIGRAM(S): at 06:47

## 2019-05-19 RX ADMIN — OXYCODONE AND ACETAMINOPHEN 1 TABLET(S): 5; 325 TABLET ORAL at 20:30

## 2019-05-19 RX ADMIN — Medication 5 MILLIGRAM(S): at 22:12

## 2019-05-19 RX ADMIN — GABAPENTIN 300 MILLIGRAM(S): 400 CAPSULE ORAL at 18:44

## 2019-05-19 RX ADMIN — Medication 10 MILLIGRAM(S): at 22:13

## 2019-05-19 RX ADMIN — OXYCODONE AND ACETAMINOPHEN 1 TABLET(S): 5; 325 TABLET ORAL at 06:10

## 2019-05-19 RX ADMIN — Medication 1 DROP(S): at 06:49

## 2019-05-19 RX ADMIN — METHOCARBAMOL 1000 MILLIGRAM(S): 500 TABLET, FILM COATED ORAL at 06:46

## 2019-05-19 RX ADMIN — AMLODIPINE BESYLATE 10 MILLIGRAM(S): 2.5 TABLET ORAL at 06:47

## 2019-05-19 RX ADMIN — METHOCARBAMOL 1000 MILLIGRAM(S): 500 TABLET, FILM COATED ORAL at 22:11

## 2019-05-19 RX ADMIN — GABAPENTIN 300 MILLIGRAM(S): 400 CAPSULE ORAL at 06:48

## 2019-05-19 RX ADMIN — Medication 81 MILLIGRAM(S): at 12:35

## 2019-05-19 RX ADMIN — CLOPIDOGREL BISULFATE 75 MILLIGRAM(S): 75 TABLET, FILM COATED ORAL at 12:35

## 2019-05-19 NOTE — PROGRESS NOTE ADULT - SUBJECTIVE AND OBJECTIVE BOX
=====================   STROKE ATTENDING NOTE  =====================     JL GRANADOS   MRN-[id number]  Summary:  54y/M PMH of CAD, NSTEMI s/p thrombectomy/FADI to mRCA (Oct 2017), aortic root aneurysm s/p repair 2016 (Dr Stevenson), was seen in ED for r/o stroke/TIA in Dec 2018 , HTN, OLIVA (CPAP nightly) presented to [facility] in [place], [place] (where he was for work) on 19 with acute onset dizziness, gait ataxia (falling to R), nausea. Following history obtained from Dr. Montez,  [phone] who cared for pt at [place]: TPA pushed at 4:11 am on 19. Initial CTH negative. CTP showing small focal area of decreased perfusion in L frontal area. CTA H/N showed R distal vertebral artery occlusion of unclear chronicity. CTH 24 hours later reportedly negative for bleed. Pt started on hep gtt, asa 81, lipitor, losartan, lopressor.  MRI showed subacute/acute L frontal and R cerebellar strokes. MRA negative. TTE showed LVH and LVEF 60%. Pt failed dysphagia screen, NGT placed for feeds and medications. Pt ambulated with PT. Pt noted to have hiccups for which he was on baclofen (per patient without relief). Of note pt has been suctioning his own secretions and tubing noted to have blood tinged secretions. (08 May 2019 20:12)    Presenting Symptoms: dizziness, ataxia, nausea    COURSE IN THE HOSPITAL   tPA given in OSH   transferred to [facility]    PMH:  S/P coronary artery stent placement H/O non-ST elevation myocardial infarction (NSTEMI) Pre-diabetes Thoracic aortic aneurysm without rupture Hyperlipidemia Obstructive sleep apnea HTN (hypertension) No pertinent past medical history  Allergies:  erythromycin (Unknown) tetanus toxoids (Fever)  Home meds:  acetaminophen amlodipine 10mg PO daily asa 81mg PO q24h atorvastatin 80mg PO HS chlorpromazine PRN cipro ophthalmic clopidogrel 75mg daily gabapentin BID heprarin 7500 q8h losartan 25mg PO daily melatonin 5mg PO HS methocarbamol 500 mg PO q8h metoprolol 25mg pO BID ocular lubricant    PHYSICAL EXAMINATION  T(C): 37 ( @ 11:27), Max: 37 ( @ 11:27) HR: 67 ( @ 11:27) (62 - 67) BP: 131/77 ( @ 11:27) (131/77 - 165/89) RR: 16 ( @ 11:27) (16 - 18) SpO2: 91% ( @ 11:27) (91% - 98%)  NEUROLOGIC EXAMINATION:  Patient is awake, alert, fully oriented, pupils 2-3mm equal and briskly reactive to light, EOMs intact, R UE weakness 4/5, moves other extremities with good strength  GENERAL:  not intubated, not in cardiorespiratory distress  EENT: anicteric  CARDIOVASC:  (+) S1 S2, normal rate and regular rhythm  PULMONARY:  clear to auscultation bilaterally  ABDOMEN:  soft, nontender, with normoactive bowel sounds; PEG site clean intact  EXTREMITIES:  no edema  SKIN:  no rash    LABS: none    Bacteriology:  CSF studies:  EEG:  Neuroimagin/09 CT head: acute / early subacute infarction R cerebellar; extensive intracranial atherosclerosis ant/post circulations  Other imaging:  Echo: EF 60%    MEDICATIONS: ASA 81mg PO q24h clopidogrel 75mg PO q24h SQH 7500 q8h gabapentin 300mg PO BID melatonin 5mg PO HS methocarbamol q8h amlodipine 10mg PO q24h losartan 25mg PO daily metoprolol 25mg PO BID atorvastatin 80mG PO HS artificial tears BID cipro ophthalmic, mouthwash BID chlorpromazine 10mg PO q8h PRN percocet nasal spray    IV FLUIDS:  DRIPS:  DIET: Jevity to 75c/hr  Lines:    CODE STATUS:  full code                       GOALS OF CARE:  aggressive                      DISPOSITION:  7Wo

## 2019-05-19 NOTE — PROGRESS NOTE ADULT - PROBLEM SELECTOR PLAN 3
Pt noted to have dysphagia, failed dysphagia screen, arrived with NGT from [facility]. Etiology likely 2/2 stroke  - FEEs failed x2  - S/p PEG placed by GS on 5/13, currently tolerated TF with Jevity 1.2 which are given during the day and held at night

## 2019-05-19 NOTE — PROGRESS NOTE ADULT - SUBJECTIVE AND OBJECTIVE BOX
**INCOMPLETE NOTE    OVERNIGHT EVENTS:    SUBJECTIVE:  Patient seen and examined at bedside.    Vital Signs Last 12 Hrs  T(F): 98.4 (05-19-19 @ 06:09), Max: 98.4 (05-19-19 @ 06:09)  HR: 62 (05-19-19 @ 06:09) (62 - 66)  BP: 164/81 (05-19-19 @ 06:09) (164/81 - 165/89)  BP(mean): --  RR: 18 (05-19-19 @ 06:09) (16 - 18)  SpO2: 95% (05-19-19 @ 06:09) (94% - 98%)  I&O's Summary      PHYSICAL EXAM:  Constitutional: NAD, comfortable in bed.  HEENT: NC/AT, PERRLA, EOMI, no conjunctival pallor or scleral icterus, MMM  Neck: Supple, no JVD  Respiratory: CTA B/L. No w/r/r.   Cardiovascular: RRR, normal S1 and S2, no m/r/g.   Gastrointestinal: +BS, soft NTND, no guarding or rebound tenderness, no palpable masses   Extremities: wwp; no cyanosis, clubbing or edema.   Vascular: Pulses equal and strong throughout.   Neurological: AAOx3, no CN deficits, strength and sensation intact throughout.   Skin: No gross skin abnormalities or rashes        LABS:                  RADIOLOGY & ADDITIONAL TESTS:    MEDICATIONS  (STANDING):  amLODIPine   Tablet 10 milliGRAM(s) Oral every 24 hours  artificial tears (preservative free) Ophthalmic Solution 1 Drop(s) Both EYES two times a day  aspirin  chewable 81 milliGRAM(s) Oral every 24 hours  atorvastatin 80 milliGRAM(s) Oral at bedtime  ciprofloxacin  0.3% Ophthalmic Solution 1 Drop(s) Both EYES every 4 hours  clopidogrel Tablet 75 milliGRAM(s) Oral every 24 hours  FIRST- Mouthwash  BLM 15 milliLiter(s) Swish and Spit two times a day  gabapentin   Solution 300 milliGRAM(s) Oral two times a day  heparin  Injectable 7500 Unit(s) SubCutaneous every 8 hours  losartan 25 milliGRAM(s) Oral daily  melatonin 5 milliGRAM(s) Oral at bedtime  methocarbamol 1000 milliGRAM(s) Oral every 8 hours  metoprolol tartrate 25 milliGRAM(s) Oral two times a day    MEDICATIONS  (PRN):  acetaminophen    Suspension .. 650 milliGRAM(s) Oral every 6 hours PRN Mild Pain (1 - 3), Moderate Pain (4 - 6)  chlorproMAZINE    Tablet 10 milliGRAM(s) Oral every 8 hours PRN Hiccups  oxyCODONE    5 mG/acetaminophen 325 mG 1 Tablet(s) Oral every 6 hours PRN Severe Pain (7 - 10)  sodium chloride 0.65% Nasal 1 Spray(s) Both Nostrils four times a day PRN Nasal Congestion OVERNIGHT EVENTS:  DAYANARA    SUBJECTIVE:  Patient seen and examined at bedside.  Patient with complaints of poor sleep overnight due to trouble with CPAP machine and alarms.  Continues to have some R shoulder discomfort.  Feels that blurry vision in R eye is stable but persists.    Vital Signs Last 12 Hrs  T(F): 98.4 (05-19-19 @ 06:09), Max: 98.4 (05-19-19 @ 06:09)  HR: 62 (05-19-19 @ 06:09) (62 - 66)  BP: 164/81 (05-19-19 @ 06:09) (164/81 - 165/89)  BP(mean): --  RR: 18 (05-19-19 @ 06:09) (16 - 18)  SpO2: 95% (05-19-19 @ 06:09) (94% - 98%)  I&O's Summary      PHYSICAL EXAM:  Constitutional: NAD, comfortable sitting up in bed.  HEENT: NC/AT, PERRLA, EOMI, no conjunctival pallor or scleral icterus, MMM  Neck: Supple, no JVD  Respiratory: CTA B/L. No w/r/r.   Cardiovascular: RRR, normal S1 and S2, no m/r/g.   Gastrointestinal: Overwieght, +BS, soft NTND, no guarding or rebound tenderness, no palpable masses.  PEG in place with site clean dry and intact  Extremities: wwp; no cyanosis, clubbing or edema.   Vascular: Pulses equal and strong throughout.   Neurological: AAOx3, no CN deficits, strength and sensation intact throughout. 4+/5 R shoulder strength and mild dysmetria with R finger to nose.  Rest of strength 5/5 throughout  Skin: L chest wall loop recorder site with some dried blood on bandage, no surrounding tenderness        LABS:  No new labs          RADIOLOGY & ADDITIONAL TESTS:  No new imaging    MEDICATIONS  (STANDING):  amLODIPine   Tablet 10 milliGRAM(s) Oral every 24 hours  artificial tears (preservative free) Ophthalmic Solution 1 Drop(s) Both EYES two times a day  aspirin  chewable 81 milliGRAM(s) Oral every 24 hours  atorvastatin 80 milliGRAM(s) Oral at bedtime  ciprofloxacin  0.3% Ophthalmic Solution 1 Drop(s) Both EYES every 4 hours  clopidogrel Tablet 75 milliGRAM(s) Oral every 24 hours  FIRST- Mouthwash  BLM 15 milliLiter(s) Swish and Spit two times a day  gabapentin   Solution 300 milliGRAM(s) Oral two times a day  heparin  Injectable 7500 Unit(s) SubCutaneous every 8 hours  losartan 25 milliGRAM(s) Oral daily  melatonin 5 milliGRAM(s) Oral at bedtime  methocarbamol 1000 milliGRAM(s) Oral every 8 hours  metoprolol tartrate 25 milliGRAM(s) Oral two times a day    MEDICATIONS  (PRN):  acetaminophen    Suspension .. 650 milliGRAM(s) Oral every 6 hours PRN Mild Pain (1 - 3), Moderate Pain (4 - 6)  chlorproMAZINE    Tablet 10 milliGRAM(s) Oral every 8 hours PRN Hiccups  oxyCODONE    5 mG/acetaminophen 325 mG 1 Tablet(s) Oral every 6 hours PRN Severe Pain (7 - 10)  sodium chloride 0.65% Nasal 1 Spray(s) Both Nostrils four times a day PRN Nasal Congestion

## 2019-05-19 NOTE — PROGRESS NOTE ADULT - PROBLEM SELECTOR PLAN 1
Pt presented to [facility]) 5/1/19 with acute onset dizziness, gait ataxia (falling to R), s/p tPA on 5/1, found to have subacute/acute L frontal and R cerebellar strokes and R distal vertebral artery occlusion of unclear chronicity, transferred to [facility] for further management.  - Initial CTH negative, CTH 24 hours later reportedly negative for bleed.   - MRI showed subacute/acute L frontal and R cerebellar strokes.   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

## 2019-05-20 PROBLEM — I25.2 OLD MYOCARDIAL INFARCTION: Chronic | Status: ACTIVE | Noted: 2019-05-08

## 2019-05-20 PROBLEM — Z95.5 PRESENCE OF CORONARY ANGIOPLASTY IMPLANT AND GRAFT: Chronic | Status: ACTIVE | Noted: 2019-05-08

## 2019-05-20 LAB
GLUCOSE BLDC GLUCOMTR-MCNC: 149 MG/DL — HIGH (ref 70–99)
HCT VFR BLD CALC: 44.4 % — SIGNIFICANT CHANGE UP (ref 39–50)
HGB BLD-MCNC: 14.7 G/DL — SIGNIFICANT CHANGE UP (ref 13–17)
MCHC RBC-ENTMCNC: 29.4 PG — SIGNIFICANT CHANGE UP (ref 27–34)
MCHC RBC-ENTMCNC: 33.1 GM/DL — SIGNIFICANT CHANGE UP (ref 32–36)
MCV RBC AUTO: 88.8 FL — SIGNIFICANT CHANGE UP (ref 80–100)
NRBC # BLD: 0 /100 WBCS — SIGNIFICANT CHANGE UP (ref 0–0)
PLATELET # BLD AUTO: 283 K/UL — SIGNIFICANT CHANGE UP (ref 150–400)
RBC # BLD: 5 M/UL — SIGNIFICANT CHANGE UP (ref 4.2–5.8)
RBC # FLD: 12 % — SIGNIFICANT CHANGE UP (ref 10.3–14.5)
WBC # BLD: 11.8 K/UL — HIGH (ref 3.8–10.5)
WBC # FLD AUTO: 11.8 K/UL — HIGH (ref 3.8–10.5)

## 2019-05-20 RX ADMIN — Medication 25 MILLIGRAM(S): at 18:43

## 2019-05-20 RX ADMIN — Medication 81 MILLIGRAM(S): at 13:04

## 2019-05-20 RX ADMIN — Medication 1 DROP(S): at 18:43

## 2019-05-20 RX ADMIN — HEPARIN SODIUM 7500 UNIT(S): 5000 INJECTION INTRAVENOUS; SUBCUTANEOUS at 22:26

## 2019-05-20 RX ADMIN — Medication 1 DROP(S): at 06:46

## 2019-05-20 RX ADMIN — LOSARTAN POTASSIUM 25 MILLIGRAM(S): 100 TABLET, FILM COATED ORAL at 06:47

## 2019-05-20 RX ADMIN — OXYCODONE AND ACETAMINOPHEN 1 TABLET(S): 5; 325 TABLET ORAL at 05:48

## 2019-05-20 RX ADMIN — CLOPIDOGREL BISULFATE 75 MILLIGRAM(S): 75 TABLET, FILM COATED ORAL at 13:04

## 2019-05-20 RX ADMIN — OXYCODONE AND ACETAMINOPHEN 1 TABLET(S): 5; 325 TABLET ORAL at 18:47

## 2019-05-20 RX ADMIN — HEPARIN SODIUM 7500 UNIT(S): 5000 INJECTION INTRAVENOUS; SUBCUTANEOUS at 13:04

## 2019-05-20 RX ADMIN — Medication 25 MILLIGRAM(S): at 06:47

## 2019-05-20 RX ADMIN — Medication 10 MILLIGRAM(S): at 13:04

## 2019-05-20 RX ADMIN — Medication 10 MILLIGRAM(S): at 23:55

## 2019-05-20 RX ADMIN — HEPARIN SODIUM 7500 UNIT(S): 5000 INJECTION INTRAVENOUS; SUBCUTANEOUS at 06:48

## 2019-05-20 RX ADMIN — OXYCODONE AND ACETAMINOPHEN 1 TABLET(S): 5; 325 TABLET ORAL at 19:18

## 2019-05-20 RX ADMIN — METHOCARBAMOL 1000 MILLIGRAM(S): 500 TABLET, FILM COATED ORAL at 06:49

## 2019-05-20 RX ADMIN — METHOCARBAMOL 1000 MILLIGRAM(S): 500 TABLET, FILM COATED ORAL at 13:04

## 2019-05-20 RX ADMIN — Medication 5 MILLIGRAM(S): at 23:56

## 2019-05-20 RX ADMIN — DIPHENHYDRAMINE HYDROCHLORIDE AND LIDOCAINE HYDROCHLORIDE AND ALUMINUM HYDROXIDE AND MAGNESIUM HYDRO 15 MILLILITER(S): KIT at 22:25

## 2019-05-20 RX ADMIN — METHOCARBAMOL 1000 MILLIGRAM(S): 500 TABLET, FILM COATED ORAL at 22:25

## 2019-05-20 RX ADMIN — Medication 650 MILLIGRAM(S): at 22:24

## 2019-05-20 RX ADMIN — GABAPENTIN 300 MILLIGRAM(S): 400 CAPSULE ORAL at 06:47

## 2019-05-20 RX ADMIN — GABAPENTIN 300 MILLIGRAM(S): 400 CAPSULE ORAL at 18:43

## 2019-05-20 RX ADMIN — DIPHENHYDRAMINE HYDROCHLORIDE AND LIDOCAINE HYDROCHLORIDE AND ALUMINUM HYDROXIDE AND MAGNESIUM HYDRO 15 MILLILITER(S): KIT at 06:49

## 2019-05-20 RX ADMIN — Medication 650 MILLIGRAM(S): at 23:20

## 2019-05-20 RX ADMIN — ATORVASTATIN CALCIUM 80 MILLIGRAM(S): 80 TABLET, FILM COATED ORAL at 23:56

## 2019-05-20 RX ADMIN — OXYCODONE AND ACETAMINOPHEN 1 TABLET(S): 5; 325 TABLET ORAL at 06:20

## 2019-05-20 RX ADMIN — AMLODIPINE BESYLATE 10 MILLIGRAM(S): 2.5 TABLET ORAL at 06:47

## 2019-05-20 NOTE — PROGRESS NOTE ADULT - PROBLEM SELECTOR PROBLEM 7
Hyperlipidemia, unspecified hyperlipidemia type

## 2019-05-20 NOTE — PROGRESS NOTE ADULT - PROBLEM SELECTOR PROBLEM 8
Pre-diabetes

## 2019-05-20 NOTE — PROGRESS NOTE ADULT - SUBJECTIVE AND OBJECTIVE BOX
Physical Medicine and Rehabilitation Progress Note:    Patient is a 54y old  Male who presents with a chief complaint of CVA (20 May 2019 09:24)      HPI:  54M PMH of CAD, NSTEMI s/p thrombectomy/FADI to mRCA (Oct 2017), aortic root aneurysm s/p repair 12/2016 (Dr Stevenson), was seen in ED for r/o stroke/TIA in Dec 2018 , HTN, OLIVA (CPAP nightly) presented to [facility] in [place], [place] (where he was for work) on 5/1/19 with acute onset dizziness, gait ataxia (falling to R), nausea. Following history obtained from Dr. Montez, Jan [phone] who cared for pt at [place]: TPA pushed at 4:11 am on 5/1/19. Initial CTH negative. CTP showing small focal area of decreased perfusion in L frontal area. CTA H/N showed R distal vertebral artery occlusion of unclear chronicity. CTH 24 hours later reportedly negative for bleed. Pt started on hep gtt, asa 81, lipitor, losartan, lopressor.  MRI showed subacute/acute L frontal and R cerebellar strokes. MRA negative. TTE showed LVH and LVEF 60%. Pt failed dysphagia screen, NGT placed for feeds and medications. Pt ambulated with PT. Pt noted to have hiccups for which he was on baclofen (per patient without relief). Of note pt has been suctioning his own secretions and tubing noted to have blood tinged secretions. (08 May 2019 20:12)                            14.7   11.80 )-----------( 283      ( 20 May 2019 10:24 )             44.4             Vital Signs Last 24 Hrs  T(C): 36.7 (20 May 2019 12:47), Max: 36.7 (20 May 2019 12:47)  T(F): 98 (20 May 2019 12:47), Max: 98 (20 May 2019 12:47)  HR: 66 (20 May 2019 12:47) (61 - 74)  BP: 112/68 (20 May 2019 12:47) (112/68 - 144/82)  BP(mean): --  RR: 16 (20 May 2019 12:47) (10 - 22)  SpO2: 94% (20 May 2019 12:47) (94% - 96%)    MEDICATIONS  (STANDING):  amLODIPine   Tablet 10 milliGRAM(s) Oral every 24 hours  artificial tears (preservative free) Ophthalmic Solution 1 Drop(s) Both EYES two times a day  aspirin  chewable 81 milliGRAM(s) Oral every 24 hours  atorvastatin 80 milliGRAM(s) Oral at bedtime  ciprofloxacin  0.3% Ophthalmic Solution 1 Drop(s) Both EYES every 4 hours  clopidogrel Tablet 75 milliGRAM(s) Oral every 24 hours  FIRST- Mouthwash  BLM 15 milliLiter(s) Swish and Spit two times a day  gabapentin   Solution 300 milliGRAM(s) Oral two times a day  heparin  Injectable 7500 Unit(s) SubCutaneous every 8 hours  losartan 25 milliGRAM(s) Oral daily  melatonin 5 milliGRAM(s) Oral at bedtime  methocarbamol 1000 milliGRAM(s) Oral every 8 hours  metoprolol tartrate 25 milliGRAM(s) Oral two times a day    MEDICATIONS  (PRN):  acetaminophen    Suspension .. 650 milliGRAM(s) Oral every 6 hours PRN Mild Pain (1 - 3), Moderate Pain (4 - 6)  chlorproMAZINE    Tablet 10 milliGRAM(s) Oral every 8 hours PRN Hiccups  metoclopramide Injectable 10 milliGRAM(s) IV Push every 6 hours PRN Hiccups  oxyCODONE    5 mG/acetaminophen 325 mG 1 Tablet(s) Oral every 6 hours PRN Severe Pain (7 - 10)  sodium chloride 0.65% Nasal 1 Spray(s) Both Nostrils four times a day PRN Nasal Congestion    Currently Undergoing Physical Therapy at bedside.    Functional Status Assessment: on 5/18/2019    Therapeutic Interventions      Bed Mobility  Bed Mobility Training Rehab Potential: fair, will monitor progress closely  Bed Mobility Training Symptoms Noted During/After Treatment: none  Bed Mobility Training Rolling/Turning: supervision  Bed Mobility Training Scooting: contact guard  Bed Mobility Training Supine-to-Sit: supervision  Bed Mobility Training Limitations: decreased strength    Sit-Stand Transfer Training  Sit-to-Stand Transfer Training Rehab Potential: fair, will monitor progress closely  Sit-to-Stand Transfer Training Symptoms Noted During/After Treatment: none  Transfer Training Sit-to-Stand Transfer: minimum assist (75% patient effort);  2 person assist;  rolling walker  Transfer Training Stand-to-Sit Transfer: minimum assist (75% patient effort);  2 person assist;  rolling walker  Sit-to-Stand Transfer Training Transfer Safety Analysis: decreased balance;  decreased strength;  impaired balance;  impaired coordination;  impaired motor control;  impaired postural control    Gait Training  Gait Training Rehab Potential: fair, will monitor progress closely  Gait Training Symptoms Noted During/After Treatment: fatigue  Gait Training: minimum assist (75% patient effort);  moderate assist (50% patient effort);  2 person assist;  rolling walker;  60'  Gait Analysis: decreased imelda;  dec R step clearance/R heel strike, RLE ataxia, double vision, when R eye covered, gait quality improved less LOB, 5 moderate LOB to R requiring min A x 2 to recover, wide WALT, waddle type gait, highly unsteady;  decreased strength            PM&R Impression: as above    Disposition Plan Recommendations: acute rehab placement

## 2019-05-20 NOTE — PROGRESS NOTE ADULT - ASSESSMENT
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    Problem/Plan - 1:  ·  Problem: Cerebrovascular accident (CVA), unspecified mechanism.  Plan: Pt presented to [facility] ([place]) 5/1/19 with acute onset dizziness, gait ataxia (falling to R), s/p tPA on 5/1, found to have subacute/acute L frontal and R cerebellar strokes and R distal vertebral artery occlusion of unclear chronicity, transferred to [facility] for further management.  - Initial CTH negative, CTH 24 hours later reportedly negative for bleed.   - MRI showed subacute/acute L frontal and R cerebellar strokes.   - MRA negative  - TTE showed LVH and LVEF 60%  - Pt failed dysphagia screen, NGT failed FEEs x2 now s/p PEG 5/13 and tolerating tube feeds  - atorvastatin 80mg qHS  - possibly will need more AC due to thromboembolic stroke in origin although patient without hx of Afib; Loop recorder placed 5/15 to evaluate for Afib and need for AC  - c/w ASA and Plavix  - Hgb A1c 6, TSH WNL lipid panel WNL  - initially with intractible hiccups now better controlled on robaxin 1000mg q8, thorazine 10mg q8, gabapentin 300mg BID and reglan 10mg q6    #Occlusion of R vertebral artery  - Plan as above.     Problem/Plan - 2:  ·  Problem: Hospital acquired PNA.  Plan: S/p full course of vanco/zosyn now without leukocytosis, BCX neg, procal neg  - Resolved  - Trend WBC  - BCx and SCx NGTD  - Procal neg, all abx d/c'd.     Problem/Plan - 3:  ·  Problem: Dysphagia, unspecified type.  Plan: Pt noted to have dysphagia, failed dysphagia screen, arrived with NGT from [facility]. Etiology likely 2/2 stroke  - FEEs failed x2  - S/p PEG placed by GS on 5/13, currently tolerated TF with Jevity 1.2 which are given during the day and held at night.     Problem/Plan - 4:  ·  Problem: Thoracic aortic aneurysm without rupture.  Plan: Pt with hx of thoracic aortic aneurysm s/p repair in 2016 by Dr Stevenson  - Per chart review: status post valve sparing aortic root replacement, ascending and hemiarch replacement on 12/19/16. Echo 3/2019: concentric LVH, EF 60%, akinesis of basal inferior wall, no significant valvulopathies   -  CT team aware Pt is here.     Problem/Plan - 5:  ·  Problem: Coronary artery disease involving native coronary artery of native heart without angina pectoris.  Plan: Pt with hx of CAD with NSTEMI, s/p stent to RCA in Oct 2017 s/p DAPT x 1 year.  - Per chart review, cardiac cath during admission for NSTEMI: LM normal, 30% pLAD stenosis, distal LCx luminal irregularities, mid 100% thrombotic RCA lesion with L to R collaterals. LVEF 55%, basal inferior hypokinesis, LVEDP 14 mmHG, 24 mm gradient across the aortic valve: mild to moderate aortic stenosis  - C/w Asa 81 mg daily  - C/w atorvastatin 80mg qHS  - Avoid NSAIDS for pain.     Problem/Plan - 6:  Problem: Hypertension, unspecified type. Plan: Improving BP control with Metoprolol 25 BID, Losartan 25mg daily (recently took himself off ACE due to cough), Amlodipine 10mg     #Conjunctivitis   -Resolved s/p cipro eye drops.    Problem/Plan - 7:  ·  Problem: Hyperlipidemia, unspecified hyperlipidemia type.  Plan: - c/w atorvastatin 80mg at HS  - LDL 57, HDL 42.     Problem/Plan - 8:  ·  Problem: Pre-diabetes.  Plan: - A1c 6.0.     Problem/Plan - 9:  ·  Problem: Obstructive sleep apnea.  Plan: Uses CPAP nightly  - c/w CPAP at night.     Problem/Plan - 10:  Problem: Nutrition, metabolism, and development symptoms. Plan; F: No IVF  E: Replete prn  N: NPO. Now with PEG on feeds     PPX: SubQ Hep, SCDs  Code: Full
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 for further management. Plan for PEG in OR today.
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 for further management. Plan for PEG in OR today.     Problem/Plan - 1:  ·  Problem: Cerebrovascular accident (CVA), unspecified mechanism.  Plan: Pt presented to [facility] ([place]) 5/1/19 with acute onset dizziness, gait ataxia (falling to R), s/p tPA on 5/1, found to have subacute/acute L frontal and R cerebellar strokes and R distal vertebral artery occlusion of unclear chronicity, transferred to [facility] for further management.  - Initial CTH negative, CTH 24 hours later reportedly negative for bleed.   - MRI showed subacute/acute L frontal and R cerebellar strokes.   - MRA negative  - TTE showed LVH and LVEF 60%  - Pt failed dysphagia screen, s/p PEG  - Repeat CTH here  - S&S eval  - atorvastatin 80mg qHS  - hep gtt, monitor ptt until therapeutic x3 (goal 60-90)  - Check hgb A1c, TSH, lipid panel  - Thorazine 25mg q8h for hiccups    #Occlusion of R vertebral artery  - Plan as above.     Problem/Plan - 2:  ·  Problem: Hospital acquired PNA.  Plan: Covering with vancomycin and zosyn for pneumonia present on admission with fever of 101.2 on May 9th and hypoxia with increased productive cough, likely obtained at [facility].  - Also potential for aspiration pneumonia given recent new swallowing deficits  - Improving, has been afebrile  - Trend WBC  - BCx and SCx NGTD  - Vanc dosing adjusted by trough  -F/u ProCal.     Problem/Plan - 3:  ·  Problem: Dysphagia, unspecified type.  Plan: Pt noted to have dysphagia, failed dysphagia screen, arrived with NGT from [facility]. Etiology likely 2/2 stroke  - FEEs failed x2  - s/p PEG 5/13/19    Problem/Plan - 4:  ·  Problem: Thoracic aortic aneurysm without rupture.  Plan: Pt with hx of thoracic aortic aneurysm s/p repair in 2016 by Dr Stevenson  - Per chart review: status post valve sparing aortic root replacement, ascending and hemiarch replacement on 12/19/16. Echo 3/2019: concentric LVH, EF 60%, akinesis of basal inferior wall, no significant valvulopathies   - Contact Dr Stevenson to make him aware pt is here.     Problem/Plan - 5:  ·  Problem: Coronary artery disease involving native coronary artery of native heart without angina pectoris.  Plan: Pt with hx of CAD with NSTEMI, s/p stent to RCA in Oct 2017 s/p DAPT x 1 year.  - Per chart review, cardiac cath during admission for NSTEMI: LM normal, 30% pLAD stenosis, distal LCx luminal irregularities, mid 100% thrombotic RCA lesion with L to R collaterals. LVEF 55%, basal inferior hypokinesis, LVEDP 14 mmHG, 24 mm gradient across the aortic valve: mild to moderate aortic stenosis  - C/w Asa 81 mg daily  - C/w lisinopril 2.5 daily, uptitrate as tolerated  - C/w atorvastatin 80mg qHS.     Problem/Plan - 6:  Problem: Hypertension, unspecified type. Plan: - C/w home medications: lisinopril 2.5mg daily, toprol xl 50mg daily  -Currently lopressor BID, Lisinopril 10mg, Amlodipine 5mg     #Conjunctivitis   -Started cipro eye drops.    Problem/Plan - 7:  ·  Problem: Hyperlipidemia, unspecified hyperlipidemia type.  Plan: - c/w atorvastatin 80mg at HS  - LDL 57, HDL 42.     Problem/Plan - 8:  ·  Problem: Pre-diabetes.  Plan: - A1c 6.0.     Problem/Plan - 9:  ·  Problem: Obstructive sleep apnea.  Plan: Uses CPAP nightly  - c/w CPAP at night.
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  Stable for discharge and pending Acute rehab placement, has approval.     Problem/Plan - 1:  ·  Problem: Cerebrovascular accident (CVA), unspecified mechanism.  Plan: Pt presented to [facility]) 5/1/19 with acute onset dizziness, gait ataxia (falling to R), s/p tPA on 5/1, found to have subacute/acute L frontal and R cerebellar strokes and R distal vertebral artery occlusion of unclear chronicity, transferred to [facility] for further management.  - Initial CTH negative, CTH 24 hours later reportedly negative for bleed.   - MRI showed subacute/acute L frontal and R cerebellar strokes.   - MRA negative  - TTE showed LVH and LVEF 60%  - Pt failed dysphagia screen, NGT failed FEEs x2 now s/p PEG 5/13 and tolerating tube feeds well, continues to work with speech and swallow  - atorvastatin 80mg qHS  - possibly will need more AC due to thromboembolic stroke in origin although patient without hx of Afib; Loop recorder placed 5/15 to evaluate for Afib and need for AC as outpatient  - c/w ASA and Plavix  - Hgb A1c 6, TSH WNL lipid panel WNL  - initially with intractible hiccups now better controlled on robaxin 1000mg q8, thorazine 10mg q8, gabapentin 300mg BID and reglan 10mg q6    #Occlusion of R vertebral artery  - Plan as above.     Problem/Plan - 2:  ·  Problem: Hospital acquired PNA.  Plan: S/p full course of vanco/zosyn now without leukocytosis, BCX neg, procal neg  - Resolved  - WBC continues to downtrend and no need to trend further  - BCx and SCx NGTD  - Procal neg, no further antibiotic needs.     Problem/Plan - 3:  ·  Problem: Dysphagia, unspecified type.  Plan: Pt noted to have dysphagia, failed dysphagia screen, arrived with NGT from [facility]. Etiology likely 2/2 stroke  - FEEs failed x2  - S/p PEG placed by GS on 5/13, currently tolerated TF with Jevity 1.2 which are given during the day and held at night.  Total 24 hour volume 1800ml.     Problem/Plan - 4:  ·  Problem: Thoracic aortic aneurysm without rupture.  Plan: Pt with hx of thoracic aortic aneurysm s/p repair in 2016 by Dr Stevenson  - Per chart review: status post valve sparing aortic root replacement, ascending and hemiarch replacement on 12/19/16. Echo 3/2019: concentric LVH, EF 60%, akinesis of basal inferior wall, no significant valvulopathies   -  CT team aware Pt is here, no further workup needed at this time.     Problem/Plan - 5:  ·  Problem: Coronary artery disease involving native coronary artery of native heart without angina pectoris.  Plan: Pt with hx of CAD with NSTEMI, s/p stent to RCA in Oct 2017 s/p DAPT x 1 year.  - Per chart review, cardiac cath during admission for NSTEMI: LM normal, 30% pLAD stenosis, distal LCx luminal irregularities, mid 100% thrombotic RCA lesion with L to R collaterals. LVEF 55%, basal inferior hypokinesis, LVEDP 14 mmHG, 24 mm gradient across the aortic valve: mild to moderate aortic stenosis  - C/w Asa 81 mg daily  - C/w atorvastatin 80mg qHS  - Avoid NSAIDS for pain.     Problem/Plan - 6:  Problem: Hypertension, unspecified type. Plan: Improving BP control with Metoprolol 25 BID, Losartan 25mg daily (recently took himself off ACE due to cough), Amlodipine 10mg.    Problem/Plan - 7:  ·  Problem: Hyperlipidemia, unspecified hyperlipidemia type.  Plan: - c/w atorvastatin 80mg at HS  - LDL 57, HDL 42.     Problem/Plan - 8:  ·  Problem: Pre-diabetes.  Plan: - A1c 6.0    #Conjunctivitis   -C/w cipro eye drops for 5 days  (EOT 5/22/19)  - no steroid eye drops, discussed risks (worsening infection, corneal ulceration) with patient and he understands.     Problem/Plan - 9:  ·  Problem: Obstructive sleep apnea.  Plan: Uses CPAP nightly  - c/w CPAP at night.     Problem/Plan - 10:  Problem: Nutrition, metabolism, and development symptoms. Plan; F: No IVF  E: Replete prn  N: NPO. Now with PEG on feeds     PPX: SubQ Hep, SCDs  Code: Full  Dispo: [facility]
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 Plan for PEG in OR today.     Problem/Plan - 1:  ·  Problem: Cerebrovascular accident (CVA), unspecified mechanism.  Plan: Pt presented to [facility] ([place]) 5/1/19 with acute onset dizziness, gait ataxia (falling to R), s/p tPA on 5/1, found to have subacute/acute L frontal and R cerebellar strokes and R distal vertebral artery occlusion of unclear chronicity, transferred to [facility] for further management.  - Initial CTH negative, CTH 24 hours later reportedly negative for bleed.   - MRI showed subacute/acute L frontal and R cerebellar strokes.   - MRA negative  - TTE showed LVH and LVEF 60%  - Pt failed dysphagia screen, s/p PEG  - Repeat CTH here  - S&S eval  - atorvastatin 80mg qHS  - hep gtt, monitor ptt until therapeutic x3 (goal 60-90)  - Check hgb A1c, TSH, lipid panel  - Thorazine 25mg q8h for hiccups    #Occlusion of R vertebral artery  - Plan as above.     Problem/Plan - 2:  ·  Problem: Hospital acquired PNA.  Plan: Covering with vancomycin and zosyn for pneumonia present on admission with fever of 101.2 on May 9th and hypoxia with increased productive cough, likely obtained at [facility].  - Also potential for aspiration pneumonia given recent new swallowing deficits  - Improving, has been afebrile  - Trend WBC  - BCx and SCx NGTD  - Vanc dosing adjusted by trough  -F/u ProCal.     Problem/Plan - 3:  ·  Problem: Dysphagia, unspecified type.  Plan: Pt noted to have dysphagia, failed dysphagia screen, arrived with NGT from [facility]. Etiology likely 2/2 stroke  - FEEs failed x2  - s/p PEG 5/13/19    Problem/Plan - 4:  ·  Problem: Thoracic aortic aneurysm without rupture.  Plan: Pt with hx of thoracic aortic aneurysm s/p repair in 2016 by Dr Stevenson  - Per chart review: status post valve sparing aortic root replacement, ascending and hemiarch replacement on 12/19/16. Echo 3/2019: concentric LVH, EF 60%, akinesis of basal inferior wall, no significant valvulopathies   - Contact Dr Stevenson to make him aware pt is here.     Problem/Plan - 5:  ·  Problem: Coronary artery disease involving native coronary artery of native heart without angina pectoris.  Plan: Pt with hx of CAD with NSTEMI, s/p stent to RCA in Oct 2017 s/p DAPT x 1 year.  - Per chart review, cardiac cath during admission for NSTEMI: LM normal, 30% pLAD stenosis, distal LCx luminal irregularities, mid 100% thrombotic RCA lesion with L to R collaterals. LVEF 55%, basal inferior hypokinesis, LVEDP 14 mmHG, 24 mm gradient across the aortic valve: mild to moderate aortic stenosis  - C/w Asa 81 mg daily  - C/w lisinopril 2.5 daily, uptitrate as tolerated  - C/w atorvastatin 80mg qHS.     Problem/Plan - 6:  Problem: Hypertension, unspecified type. Plan: - C/w home medications: lisinopril 2.5mg daily, toprol xl 50mg daily  -Currently lopressor BID, Lisinopril 10mg, Amlodipine 5mg     #Conjunctivitis   -Started cipro eye drops.    Problem/Plan - 7:  ·  Problem: Hyperlipidemia, unspecified hyperlipidemia type.  Plan: - c/w atorvastatin 80mg at HS  - LDL 57, HDL 42.     Problem/Plan - 8:  ·  Problem: Pre-diabetes.  Plan: - A1c 6.0.     Problem/Plan - 9:  ·  Problem: Obstructive sleep apnea.  Plan: Uses CPAP nightly  - c/w CPAP at night.     Problem/Plan - 10:  Problem: Nutrition, metabolism, and development symptoms. Plan; F: No IVF  E: Replete prn  N: NPO, attempted tube feeds through NG tube but patient did not tolerate, significant abd pain and nausea. Awaiting PEG.     PPX: on hep gtt full AC, SCDs  Code: Full  Dispo:7L
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 for further management.    Problem/Plan - 1:  ·  Problem: Cerebrovascular accident (CVA), unspecified mechanism.  Plan: Pt presented to [facility] ([place]) 5/1/19 with acute onset dizziness, gait ataxia (falling to R), s/p tPA on 5/1, found to have subacute/acute L frontal and R cerebellar strokes and R distal vertebral artery occlusion of unclear chronicity, transferred to [facility] for further management.  - Initial CTH negative, CTH 24 hours later reportedly negative for bleed.   - MRI showed subacute/acute L frontal and R cerebellar strokes.   - MRA negative  - TTE showed LVH and LVEF 60%  - Pt failed dysphagia screen, NGT placed for feeds and medications.  - Repeat CTH here  - S&S eval  - atorvastatin 80mg qHS  - hep gtt, monitor ptt until therapeutic x3 (goal 60-90)  - Check hgb A1c, TSH, lipid panel  - Thorazine 25mg q8h for hiccups.     Problem/Plan - 2:  ·  Problem: Occlusion of right vertebral artery.  Plan: Plan as above.     Problem/Plan - 3:  ·  Problem: Dysphagia, unspecified type.  Plan: Pt noted to have dysphagia, failed dysphagia screen, arrived with NGT from [facility]. Etiology likely 2/2 stroke  - Formal speech and swallow eval  - NGT placement confirmed by radiology here.     Problem/Plan - 4:  ·  Problem: Thoracic aortic aneurysm without rupture.  Plan: Pt with hx of thoracic aortic aneurysm s/p repair in 2016 by Dr Stevenson  - Per chart review: status post valve sparing aortic root replacement, ascending and hemiarch replacement on 12/19/16. Echo 3/2019: concentric LVH, EF 60%, akinesis of basal inferior wall, no significant valvulopathies   - Contact Dr Stevenson to make him aware pt is here.     Problem/Plan - 5:  ·  Problem: Coronary artery disease involving native coronary artery of native heart without angina pectoris.  Plan: Pt with hx of CAD with NSTEMI, s/p stent to RCA in Oct 2017 s/p DAPT x 1 year.  - Per chart review, cardiac cath during admission for NSTEMI: LM normal, 30% pLAD stenosis, distal LCx luminal irregularities, mid 100% thrombotic RCA lesion with L to R collaterals. LVEF 55%, basal inferior hypokinesis, LVEDP 14 mmHG, 24 mm gradient across the aortic valve: mild to moderate aortic stenosis  - C/w Asa 81 mg daily  - C/w lisinopril 2.5 daily, uptitrate as tolerated  - C/w atorvastatin 80mg qHS.     Problem/Plan - 6:  Problem: Hypertension, unspecified type. Plan: - C/w home medications: lisinopril 2.5mg daily, toprol xl 50mg daily.    Problem/Plan - 7:  ·  Problem: Hyperlipidemia, unspecified hyperlipidemia type.  Plan: - c/w atorvastatin 80mg at HS  -  check lipid panel.     Problem/Plan - 8:  ·  Problem: Pre-diabetes.  Plan: - check a1c.     Problem/Plan - 9:  ·  Problem: Obstructive sleep apnea.  Plan: Uses CPAP nightly  - c/w CPAP at night.     Problem/Plan - 10:  Problem: Nutrition, metabolism, and development symptoms. Plan; F: No IVF  E: Replete prn  N: NPO, plan to start TF pending nutrition eval    PPX: on hep gtt full AC, SCDs  Code: Full
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 for further management s/p PEG placed for failed S&S pending Acute rehab placement.
54y Male with CVA s/p EG tube placement POD 1    Continue TF to goal  Surgery will continue to follow until read TF goal  Keep the binder at all time    Plan d/w 
54y Male with CVA s/p EG tube placement POD 2    Patient is currently tolerating TF at goal   Surgery will sign off  Reconsult as needed     Plan d/w 
54y/M with  atherosclerotic ca  1.  acute / subacute infarction R cerebellar  2.  extensive intracranial atherosclerosis  3.  pre-diabetes, Hypertension dyslipidemia  4.  h/o OLIVA  5.  CAD s/p stent, NSTEMI  6.  thoracic aortic aneurysm    PLAN:   - continue high dose statins and dual antiplatelet therapy   - loop recorder  - CPAP at night  - DISPO - acute rehab pending auth
54y/M with  atherosclerotic ca  1.  acute / subacute infarction R cerebellar  2.  extensive intracranial atherosclerosis  3.  pre-diabetes, Hypertension dyslipidemia  4.  h/o OLIVA  5.  CAD s/p stent, NSTEMI  6.  thoracic aortic aneurysm    PLAN:   - continue high dose statins and dual antiplatelet therapy   - loop recorder  - CPAP at night  - DISPO - acute rehab pending auth
A/P: 54y Male with CVA planned for PEG tube placement     NPO past midnight, except medications  IVF  PEG tube placement today, pre op and consented     Post PEG tube placement care :  Keep the abdominal binder at all time  Patient can start having meds through the tube today  Can start TF 24 hours after the PEG tube placement
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 PEG in place, doing well with PT. Plan to s/d to [facility] for Acute Rehab authorization.
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 in [place], [place] (where he was for work) on 5/1/19 with acute onset dizziness, gait ataxia (falling to R), s/p tPA on 5/1, found to have subacute/acute L frontal and R cerebellar strokes and R distal vertebral artery occlusion of unclear chronicity, transferred to [facility] for further management s/p PEG placed for failed S&S pending Acute rehab placement.
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  Stable for discharge and pending Acute rehab placement, has approval.
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 for further management.

## 2019-05-20 NOTE — PROGRESS NOTE ADULT - PROBLEM SELECTOR PROBLEM 4
Thoracic aortic aneurysm without rupture

## 2019-05-20 NOTE — PROGRESS NOTE ADULT - PROBLEM SELECTOR PLAN 4
Pt with hx of thoracic aortic aneurysm s/p repair in 2016 by Dr Stevenson  - Per chart review: status post valve sparing aortic root replacement, ascending and hemiarch replacement on 12/19/16. Echo 3/2019: concentric LVH, EF 60%, akinesis of basal inferior wall, no significant valvulopathies   - Contact Dr Stevenson to make him aware pt is here
Pt with hx of thoracic aortic aneurysm s/p repair in 2016 by Dr Stevenson  - Per chart review: status post valve sparing aortic root replacement, ascending and hemiarch replacement on 12/19/16. Echo 3/2019: concentric LVH, EF 60%, akinesis of basal inferior wall, no significant valvulopathies   -  CT team aware Pt is here
Pt with hx of thoracic aortic aneurysm s/p repair in 2016 by Dr Stevenson  - Per chart review: status post valve sparing aortic root replacement, ascending and hemiarch replacement on 12/19/16. Echo 3/2019: concentric LVH, EF 60%, akinesis of basal inferior wall, no significant valvulopathies   -  CT team aware Pt is here
Pt with hx of thoracic aortic aneurysm s/p repair in 2016 by Dr Stevenson  - Per chart review: status post valve sparing aortic root replacement, ascending and hemiarch replacement on 12/19/16. Echo 3/2019: concentric LVH, EF 60%, akinesis of basal inferior wall, no significant valvulopathies   - Contact Dr Stevenson to make him aware pt is here
Pt with hx of thoracic aortic aneurysm s/p repair in 2016 by Dr Stevenson  - Per chart review: status post valve sparing aortic root replacement, ascending and hemiarch replacement on 12/19/16. Echo 3/2019: concentric LVH, EF 60%, akinesis of basal inferior wall, no significant valvulopathies   -  CT team aware Pt is here
Pt with hx of thoracic aortic aneurysm s/p repair in 2016 by Dr Stevenson  - Per chart review: status post valve sparing aortic root replacement, ascending and hemiarch replacement on 12/19/16. Echo 3/2019: concentric LVH, EF 60%, akinesis of basal inferior wall, no significant valvulopathies   -  CT team aware Pt is here, no further workup needed at this time
Pt with hx of thoracic aortic aneurysm s/p repair in 2016 by Dr Stevenson  - Per chart review: status post valve sparing aortic root replacement, ascending and hemiarch replacement on 12/19/16. Echo 3/2019: concentric LVH, EF 60%, akinesis of basal inferior wall, no significant valvulopathies   - Contact Dr Stevenson to make him aware pt is here

## 2019-05-20 NOTE — PROGRESS NOTE ADULT - PROBLEM SELECTOR PLAN 5
Pt with hx of CAD with NSTEMI, s/p stent to RCA in Oct 2017 s/p DAPT x 1 year.  - Per chart review, cardiac cath during admission for NSTEMI: LM normal, 30% pLAD stenosis, distal LCx luminal irregularities, mid 100% thrombotic RCA lesion with L to R collaterals. LVEF 55%, basal inferior hypokinesis, LVEDP 14 mmHG, 24 mm gradient across the aortic valve: mild to moderate aortic stenosis  - C/w Asa 81 mg daily  - C/w lisinopril 2.5 daily, uptitrate as tolerated  - C/w atorvastatin 80mg qHS
Pt with hx of CAD with NSTEMI, s/p stent to RCA in Oct 2017 s/p DAPT x 1 year.  - Per chart review, cardiac cath during admission for NSTEMI: LM normal, 30% pLAD stenosis, distal LCx luminal irregularities, mid 100% thrombotic RCA lesion with L to R collaterals. LVEF 55%, basal inferior hypokinesis, LVEDP 14 mmHG, 24 mm gradient across the aortic valve: mild to moderate aortic stenosis  - C/w Asa 81 mg daily  - C/w atorvastatin 80mg qHS
Pt with hx of CAD with NSTEMI, s/p stent to RCA in Oct 2017 s/p DAPT x 1 year.  - Per chart review, cardiac cath during admission for NSTEMI: LM normal, 30% pLAD stenosis, distal LCx luminal irregularities, mid 100% thrombotic RCA lesion with L to R collaterals. LVEF 55%, basal inferior hypokinesis, LVEDP 14 mmHG, 24 mm gradient across the aortic valve: mild to moderate aortic stenosis  - C/w Asa 81 mg daily  - C/w atorvastatin 80mg qHS  - Avoid NSAIDS for pain
Pt with hx of CAD with NSTEMI, s/p stent to RCA in Oct 2017 s/p DAPT x 1 year.  - Per chart review, cardiac cath during admission for NSTEMI: LM normal, 30% pLAD stenosis, distal LCx luminal irregularities, mid 100% thrombotic RCA lesion with L to R collaterals. LVEF 55%, basal inferior hypokinesis, LVEDP 14 mmHG, 24 mm gradient across the aortic valve: mild to moderate aortic stenosis  - C/w Asa 81 mg daily  - C/w lisinopril 2.5 daily, uptitrate as tolerated  - C/w atorvastatin 80mg qHS
Pt with hx of CAD with NSTEMI, s/p stent to RCA in Oct 2017 s/p DAPT x 1 year.  - Per chart review, cardiac cath during admission for NSTEMI: LM normal, 30% pLAD stenosis, distal LCx luminal irregularities, mid 100% thrombotic RCA lesion with L to R collaterals. LVEF 55%, basal inferior hypokinesis, LVEDP 14 mmHG, 24 mm gradient across the aortic valve: mild to moderate aortic stenosis  - C/w Asa 81 mg daily  - C/w atorvastatin 80mg qHS
Pt with hx of CAD with NSTEMI, s/p stent to RCA in Oct 2017 s/p DAPT x 1 year.  - Per chart review, cardiac cath during admission for NSTEMI: LM normal, 30% pLAD stenosis, distal LCx luminal irregularities, mid 100% thrombotic RCA lesion with L to R collaterals. LVEF 55%, basal inferior hypokinesis, LVEDP 14 mmHG, 24 mm gradient across the aortic valve: mild to moderate aortic stenosis  - C/w Asa 81 mg daily  - C/w atorvastatin 80mg qHS  - Avoid NSAIDS for pain
Pt with hx of CAD with NSTEMI, s/p stent to RCA in Oct 2017 s/p DAPT x 1 year.  - Per chart review, cardiac cath during admission for NSTEMI: LM normal, 30% pLAD stenosis, distal LCx luminal irregularities, mid 100% thrombotic RCA lesion with L to R collaterals. LVEF 55%, basal inferior hypokinesis, LVEDP 14 mmHG, 24 mm gradient across the aortic valve: mild to moderate aortic stenosis  - C/w Asa 81 mg daily  - C/w lisinopril 2.5 daily, uptitrate as tolerated  - C/w atorvastatin 80mg qHS

## 2019-05-20 NOTE — PROGRESS NOTE ADULT - PROBLEM SELECTOR PLAN 9
Uses CPAP nightly  - c/w CPAP at night

## 2019-05-20 NOTE — PROGRESS NOTE ADULT - PROBLEM SELECTOR PROBLEM 2
Hospital acquired PNA
Occlusion of right vertebral artery
Occlusion of right vertebral artery
Hospital acquired PNA

## 2019-05-20 NOTE — PROGRESS NOTE ADULT - PROBLEM SELECTOR PLAN 7
- c/w atorvastatin 80mg at HS  - LDL 57, HDL 42
- c/w atorvastatin 80mg at HS  -  check lipid panel
- c/w atorvastatin 80mg at HS  -  check lipid panel
- c/w atorvastatin 80mg at HS  - LDL 57, HDL 42

## 2019-05-20 NOTE — PROGRESS NOTE ADULT - PROBLEM SELECTOR PROBLEM 3
Dysphagia, unspecified type

## 2019-05-20 NOTE — PROGRESS NOTE ADULT - PROBLEM SELECTOR PROBLEM 6
Hypertension, unspecified type

## 2019-05-20 NOTE — PROGRESS NOTE ADULT - PROBLEM SELECTOR PLAN 2
Covering with vancomycin and zosyn for pneumonia present on admission with fever of 101.2 on May 9th and hypoxia with increased productive cough, likely obtained at [facility].  - Also potential for aspiration pneumonia given recent new swallowing deficits  - Improving, has been afebrile  - Trend WBC  - BCx and SCx NGTD  - Vanc dosing adjusted by trough
Covering with vancomycin and zosyn for pneumonia present on admission with fever of 101.2 on May 9th and hypoxia with increased productive cough, likely obtained at [facility].  - Also potential for aspiration pneumonia given recent new swallowing deficits  - Improving, has been afebrile  - Trend WBC  - BCx and SCx NGTD  - Vanc dosing adjusted by trough  -F/u ProCal
Plan as above
Plan as above
Pt spiked 101.2 on evening of admission, concern HAP/aspiration PNA POA from OSH. Started on vancomycin and zosyn. S/p 6 days of vanco/zosyn without leukocytosis, BCX neg, procal neg now off.   - Improving, has been afebrile  - Trend WBC  - BCx and SCx NGTD  -Procal neg, all abx d/c'd
S/p full course of vanco/zosyn now without leukocytosis, BCX neg, procal neg  - Resolved  - Trend WBC  - BCx and SCx NGTD  - Procal neg, all abx d/c'd
Pt spiked 101.2 on evening of admission, concern HAP/aspiration PNA POA from OSH. Started on vancomycin and zosyn. S/p 6 days of vanco/zosyn without leukocytosis, BCX neg, procal neg now off.   - Improving, has been afebrile  - Trend WBC  - BCx and SCx NGTD  -Procal neg, all abx d/c'd
S/p full course of vanco/zosyn now without leukocytosis, BCX neg, procal neg  - Resolved  - Trend WBC  - BCx and SCx NGTD  - Procal neg, all abx d/c'd
S/p full course of vanco/zosyn now without leukocytosis, BCX neg, procal neg  - Resolved  - Trend WBC  - BCx and SCx NGTD  - Procal neg, all abx d/c'd
S/p full course of vanco/zosyn now without leukocytosis, BCX neg, procal neg  - Resolved  - WBC continues to downtrend and no need to trend further  - BCx and SCx NGTD  - Procal neg, no further antibiotic needs
Covering with vancomycin and zosyn for pneumonia present on admission with fever of 101.2 on May 9th and hypoxia with increased productive cough, likely obtained at [facility].  - Also potential for aspiration pneumonia given recent new swallowing deficits  - Improving, has been afebrile  - Trend WBC  - BCx and SCx NGTD  - Vanc dosing adjusted by trough  -F/u ProCal

## 2019-05-20 NOTE — PROGRESS NOTE ADULT - PROBLEM SELECTOR PLAN 6
- C/w home medications: lisinopril 2.5mg daily, toprol xl 50mg daily
- C/w home medications: lisinopril 2.5mg daily, toprol xl 50mg daily  -Currently lopressor BID, Lisinopril 10mg, Amlodipine 5mg     #Conjunctivitis   -Started cipro eye drops
Improving BP control with Metoprolol 25 BID, Losartan 25mg daily (recently took himself off ACE due to cough), Amlodipine 10mg     #Conjunctivitis   -Resolved s/p cipro eye drops
Pt's BP has been uncontrolled last couple of days. C/w:   Metoprolol 25 BID, Losartan 40mg daily (recently took himself off ACE due to cough), Amlodipine 10mg     #Conjunctivitis   -C/w cipro eye drops
Improving BP control with Metoprolol 25 BID, Losartan 25mg daily (recently took himself off ACE due to cough), Amlodipine 10mg
Pt's BP has been uncontrolled last couple of days. C/w:   Metoprolol 25 BID, Losartan 40mg daily (recently took himself off ACE due to cough), Amlodipine 10mg     #Conjunctivitis   -C/w cipro eye drops
Improving BP control with Metoprolol 25 BID, Losartan 25mg daily (recently took himself off ACE due to cough), Amlodipine 10mg
Improving BP control with Metoprolol 25 BID, Losartan 25mg daily (recently took himself off ACE due to cough), Amlodipine 10mg     #Conjunctivitis   -Resolved s/p cipro eye drops
- C/w home medications: lisinopril 2.5mg daily, toprol xl 50mg daily

## 2019-05-20 NOTE — PROGRESS NOTE ADULT - PROBLEM SELECTOR PLAN 10
F: No IVF  E: Replete prn  N: NPO, attempted tube feeds through NG tube but patient did not tolerate, significant abd pain and nausea. Awaiting PEG.     PPX: on hep gtt full AC, SCDs  Code: Full  Dispo:7L    #Transition of care  1) PCP Contacted on Admission: (Y) --> Name & Phone #: Dr Issa  2) Date of Contact with PCP: 5/8/19  3) PCP Contacted at Discharge: (Y/N, N/A)  4) Summary of Handoff Given to PCP:   5) Post-Discharge Appointment Date and Location:
F: No IVF  E: Replete prn  N: NPO, plan to start TF pending nutrition eval    PPX: on hep gtt full AC, SCDs  Code: Full  Dispo:7L    #Transition of care  1) PCP Contacted on Admission: (Y) --> Name & Phone #: Dr Issa  2) Date of Contact with PCP: 5/8/19  3) PCP Contacted at Discharge: (Y/N, N/A)  4) Summary of Handoff Given to PCP:   5) Post-Discharge Appointment Date and Location:
F: No IVF  E: Replete prn  N: NPO, plan to start TF pending nutrition eval    PPX: on hep gtt full AC, SCDs  Code: Full  Dispo:7L    #Transition of care  1) PCP Contacted on Admission: (Y) --> Name & Phone #: Dr Issa  2) Date of Contact with PCP: 5/8/19  3) PCP Contacted at Discharge: (Y/N, N/A)  4) Summary of Handoff Given to PCP:   5) Post-Discharge Appointment Date and Location:
F: No IVF  E: Replete prn  N: NPO. Now with PEG on feeds     PPX: SubQ Hep, SCDs  Code: Full  Dispo: GMF     #Transition of care  1) PCP Contacted on Admission: (Y) --> Name & Phone #: Dr Issa  2) Date of Contact with PCP: 5/8/19  3) PCP Contacted at Discharge: (Y/N, N/A)  4) Summary of Handoff Given to PCP:   5) Post-Discharge Appointment Date and Location:
F: No IVF  E: Replete prn  N: NPO. Now with PEG on feeds     PPX: on hep gtt full AC, SCDs  Code: Full  Dispo: GMF     #Transition of care  1) PCP Contacted on Admission: (Y) --> Name & Phone #: Dr Issa  2) Date of Contact with PCP: 5/8/19  3) PCP Contacted at Discharge: (Y/N, N/A)  4) Summary of Handoff Given to PCP:   5) Post-Discharge Appointment Date and Location:
F: No IVF  E: Replete prn  N: NPO, attempted tube feeds through NG tube but patient did not tolerate, significant abd pain and nausea. Awaiting PEG.     PPX: on hep gtt full AC, SCDs  Code: Full  Dispo:7L    #Transition of care  1) PCP Contacted on Admission: (Y) --> Name & Phone #: Dr Issa  2) Date of Contact with PCP: 5/8/19  3) PCP Contacted at Discharge: (Y/N, N/A)  4) Summary of Handoff Given to PCP:   5) Post-Discharge Appointment Date and Location:
F: No IVF  E: Replete prn  N: NPO. Now with PEG on feeds     PPX: SubQ Hep, SCDs  Code: Full  Dispo: GMF     #Transition of care  1) PCP Contacted on Admission: (Y) --> Name & Phone #: Dr Issa  2) Date of Contact with PCP: 5/8/19  3) PCP Contacted at Discharge: (Y/N, N/A)  4) Summary of Handoff Given to PCP:   5) Post-Discharge Appointment Date and Location:

## 2019-05-20 NOTE — PROGRESS NOTE ADULT - PROBLEM SELECTOR PROBLEM 1
Cerebrovascular accident (CVA), unspecified mechanism

## 2019-05-20 NOTE — PROGRESS NOTE ADULT - PROBLEM SELECTOR PROBLEM 9
Obstructive sleep apnea

## 2019-05-20 NOTE — PROGRESS NOTE ADULT - PROBLEM SELECTOR PLAN 8
- A1c 6.0
- A1c 6.0  -MISS while admitted
- check a1c
- check a1c
- A1c 6.0    #Conjunctivitis   -C/w cipro eye drops for 5 days  - no steroid eye drops, discussed risks with patient and he understands
- A1c 6.0  -MISS while admitted
- A1c 6.0    #Conjunctivitis   -C/w cipro eye drops for 5 days
- A1c 6.0    #Conjunctivitis   -C/w cipro eye drops for 5 days  (EOT 5/22/19)  - no steroid eye drops, discussed risks (worsening infection, corneal ulceration) with patient and he understands
- A1c 6.0

## 2019-05-20 NOTE — PROGRESS NOTE ADULT - PROBLEM SELECTOR PLAN 3
Pt noted to have dysphagia, failed dysphagia screen, arrived with NGT from [facility]. Etiology likely 2/2 stroke  - FEEs failed x2  - S/p PEG placed by GS on 5/13, currently tolerated TF with Jevity 1.2 which are given during the day and held at night.  Total 24 hour volume 1800ml

## 2019-05-20 NOTE — PROGRESS NOTE ADULT - SUBJECTIVE AND OBJECTIVE BOX
**INCOMPLETE NOTE    OVERNIGHT EVENTS:    SUBJECTIVE:  Patient seen and examined at bedside.    Vital Signs Last 12 Hrs  T(F): 97.8 (05-20-19 @ 05:23), Max: 97.8 (05-19-19 @ 22:00)  HR: 64 (05-20-19 @ 05:55) (61 - 74)  BP: 142/81 (05-20-19 @ 05:23) (142/81 - 144/82)  BP(mean): --  RR: 20 (05-20-19 @ 05:55) (10 - 22)  SpO2: 96% (05-20-19 @ 05:55) (94% - 96%)  I&O's Summary      PHYSICAL EXAM:  Constitutional: NAD, comfortable in bed.  HEENT: NC/AT, PERRLA, EOMI, no conjunctival pallor or scleral icterus, MMM  Neck: Supple, no JVD  Respiratory: CTA B/L. No w/r/r.   Cardiovascular: RRR, normal S1 and S2, no m/r/g.   Gastrointestinal: +BS, soft NTND, no guarding or rebound tenderness, no palpable masses   Extremities: wwp; no cyanosis, clubbing or edema.   Vascular: Pulses equal and strong throughout.   Neurological: AAOx3, no CN deficits, strength and sensation intact throughout.   Skin: No gross skin abnormalities or rashes        LABS:                  RADIOLOGY & ADDITIONAL TESTS:    MEDICATIONS  (STANDING):  amLODIPine   Tablet 10 milliGRAM(s) Oral every 24 hours  artificial tears (preservative free) Ophthalmic Solution 1 Drop(s) Both EYES two times a day  aspirin  chewable 81 milliGRAM(s) Oral every 24 hours  atorvastatin 80 milliGRAM(s) Oral at bedtime  ciprofloxacin  0.3% Ophthalmic Solution 1 Drop(s) Both EYES every 4 hours  clopidogrel Tablet 75 milliGRAM(s) Oral every 24 hours  FIRST- Mouthwash  BLM 15 milliLiter(s) Swish and Spit two times a day  gabapentin   Solution 300 milliGRAM(s) Oral two times a day  heparin  Injectable 7500 Unit(s) SubCutaneous every 8 hours  losartan 25 milliGRAM(s) Oral daily  melatonin 5 milliGRAM(s) Oral at bedtime  methocarbamol 1000 milliGRAM(s) Oral every 8 hours  metoprolol tartrate 25 milliGRAM(s) Oral two times a day    MEDICATIONS  (PRN):  acetaminophen    Suspension .. 650 milliGRAM(s) Oral every 6 hours PRN Mild Pain (1 - 3), Moderate Pain (4 - 6)  chlorproMAZINE    Tablet 10 milliGRAM(s) Oral every 8 hours PRN Hiccups  metoclopramide Injectable 10 milliGRAM(s) IV Push every 6 hours PRN Hiccups  oxyCODONE    5 mG/acetaminophen 325 mG 1 Tablet(s) Oral every 6 hours PRN Severe Pain (7 - 10)  sodium chloride 0.65% Nasal 1 Spray(s) Both Nostrils four times a day PRN Nasal Congestion **INCOMPLETE NOTE    OVERNIGHT EVENTS:  DAYANARA    SUBJECTIVE:  Patient seen and examined at bedside.  Patient without complaints this AM, eager for rehab placement    Vital Signs Last 12 Hrs  T(F): 97.8 (05-20-19 @ 05:23), Max: 97.8 (05-19-19 @ 22:00)  HR: 64 (05-20-19 @ 05:55) (61 - 74)  BP: 142/81 (05-20-19 @ 05:23) (142/81 - 144/82)  BP(mean): --  RR: 20 (05-20-19 @ 05:55) (10 - 22)  SpO2: 96% (05-20-19 @ 05:55) (94% - 96%)  I&O's Summary      PHYSICAL EXAM:  Constitutional: NAD, comfortable in bed.  HEENT: NC/AT, PERRLA, EOMI, no conjunctival pallor or scleral icterus, MMM  Neck: Supple, no JVD  Respiratory: CTA B/L. No w/r/r.   Cardiovascular: RRR, normal S1 and S2, no m/r/g.   Gastrointestinal: +BS, soft NTND, no guarding or rebound tenderness, no palpable masses   Extremities: wwp; no cyanosis, clubbing or edema.   Vascular: Pulses equal and strong throughout.   Neurological: AAOx3, no CN deficits, strength and sensation intact throughout.   Skin: No gross skin abnormalities or rashes        LABS:                  RADIOLOGY & ADDITIONAL TESTS:    MEDICATIONS  (STANDING):  amLODIPine   Tablet 10 milliGRAM(s) Oral every 24 hours  artificial tears (preservative free) Ophthalmic Solution 1 Drop(s) Both EYES two times a day  aspirin  chewable 81 milliGRAM(s) Oral every 24 hours  atorvastatin 80 milliGRAM(s) Oral at bedtime  ciprofloxacin  0.3% Ophthalmic Solution 1 Drop(s) Both EYES every 4 hours  clopidogrel Tablet 75 milliGRAM(s) Oral every 24 hours  FIRST- Mouthwash  BLM 15 milliLiter(s) Swish and Spit two times a day  gabapentin   Solution 300 milliGRAM(s) Oral two times a day  heparin  Injectable 7500 Unit(s) SubCutaneous every 8 hours  losartan 25 milliGRAM(s) Oral daily  melatonin 5 milliGRAM(s) Oral at bedtime  methocarbamol 1000 milliGRAM(s) Oral every 8 hours  metoprolol tartrate 25 milliGRAM(s) Oral two times a day    MEDICATIONS  (PRN):  acetaminophen    Suspension .. 650 milliGRAM(s) Oral every 6 hours PRN Mild Pain (1 - 3), Moderate Pain (4 - 6)  chlorproMAZINE    Tablet 10 milliGRAM(s) Oral every 8 hours PRN Hiccups  metoclopramide Injectable 10 milliGRAM(s) IV Push every 6 hours PRN Hiccups  oxyCODONE    5 mG/acetaminophen 325 mG 1 Tablet(s) Oral every 6 hours PRN Severe Pain (7 - 10)  sodium chloride 0.65% Nasal 1 Spray(s) Both Nostrils four times a day PRN Nasal Congestion OVERNIGHT EVENTS:  DAYANARA    SUBJECTIVE:  Patient seen and examined at bedside.  Patient without complaints this AM, eager for rehab placement.  Denies CP, SOB, fever, chills, N/V, abdominal pain.    Vital Signs Last 12 Hrs  T(F): 97.8 (05-20-19 @ 05:23), Max: 97.8 (05-19-19 @ 22:00)  HR: 64 (05-20-19 @ 05:55) (61 - 74)  BP: 142/81 (05-20-19 @ 05:23) (142/81 - 144/82)  BP(mean): --  RR: 20 (05-20-19 @ 05:55) (10 - 22)  SpO2: 96% (05-20-19 @ 05:55) (94% - 96%)  I&O's Summary      PHYSICAL EXAM:  Constitutional: NAD, comfortable in bed.  HEENT: NC/AT, PERRLA, EOMI, no conjunctival pallor or scleral icterus, MMM  Neck: Supple, no JVD  Respiratory: CTA B/L. No w/r/r.   Cardiovascular: RRR, normal S1 and S2, no m/r/g.   Gastrointestinal: Overwieght, +BS, soft NTND, no guarding or rebound tenderness, no palpable masses.  PEG in place with site clean dry and intact  Extremities: wwp; no cyanosis, clubbing or edema.   Vascular: Pulses equal and strong throughout.   Neurological: AAOx3, no CN deficits, strength and sensation intact throughout. 4+ to 5/5 R shoulder strength and mild dysmetria with R finger to nose.  Rest of strength 5/5 throughout  Skin: L chest wall loop recorder site with dried blood on bandage, no surrounding tenderness, no interval increase in bleeding      LABS:                        14.7   11.80 )-----------( 283      ( 20 May 2019 10:24 )             44.4       RADIOLOGY & ADDITIONAL TESTS:    MEDICATIONS  (STANDING):  amLODIPine   Tablet 10 milliGRAM(s) Oral every 24 hours  artificial tears (preservative free) Ophthalmic Solution 1 Drop(s) Both EYES two times a day  aspirin  chewable 81 milliGRAM(s) Oral every 24 hours  atorvastatin 80 milliGRAM(s) Oral at bedtime  ciprofloxacin  0.3% Ophthalmic Solution 1 Drop(s) Both EYES every 4 hours  clopidogrel Tablet 75 milliGRAM(s) Oral every 24 hours  FIRST- Mouthwash  BLM 15 milliLiter(s) Swish and Spit two times a day  gabapentin   Solution 300 milliGRAM(s) Oral two times a day  heparin  Injectable 7500 Unit(s) SubCutaneous every 8 hours  losartan 25 milliGRAM(s) Oral daily  melatonin 5 milliGRAM(s) Oral at bedtime  methocarbamol 1000 milliGRAM(s) Oral every 8 hours  metoprolol tartrate 25 milliGRAM(s) Oral two times a day    MEDICATIONS  (PRN):  acetaminophen    Suspension .. 650 milliGRAM(s) Oral every 6 hours PRN Mild Pain (1 - 3), Moderate Pain (4 - 6)  chlorproMAZINE    Tablet 10 milliGRAM(s) Oral every 8 hours PRN Hiccups  metoclopramide Injectable 10 milliGRAM(s) IV Push every 6 hours PRN Hiccups  oxyCODONE    5 mG/acetaminophen 325 mG 1 Tablet(s) Oral every 6 hours PRN Severe Pain (7 - 10)  sodium chloride 0.65% Nasal 1 Spray(s) Both Nostrils four times a day PRN Nasal Congestion

## 2019-05-20 NOTE — PROGRESS NOTE ADULT - PROBLEM SELECTOR PLAN 1
Pt presented to [facility]) 5/1/19 with acute onset dizziness, gait ataxia (falling to R), s/p tPA on 5/1, found to have subacute/acute L frontal and R cerebellar strokes and R distal vertebral artery occlusion of unclear chronicity, transferred to [facility] for further management.  - Initial CTH negative, CTH 24 hours later reportedly negative for bleed.   - MRI showed subacute/acute L frontal and R cerebellar strokes.   - MRA negative  - TTE showed LVH and LVEF 60%  - Pt failed dysphagia screen, NGT failed FEEs x2 now s/p PEG 5/13 and tolerating tube feeds well, continues to work with speech and swallow  - atorvastatin 80mg qHS  - possibly will need more AC due to thromboembolic stroke in origin although patient without hx of Afib; Loop recorder placed 5/15 to evaluate for Afib and need for AC as outpatient  - c/w ASA and Plavix  - pending acute rehab   - Hgb A1c 6, TSH WNL lipid panel WNL  - initially with intractible hiccups now better controlled on robaxin 1000mg q8, thorazine 10mg q8, gabapentin 300mg BID and reglan 10mg q6    #Occlusion of R vertebral artery  - Plan as above

## 2019-05-21 ENCOUNTER — TRANSCRIPTION ENCOUNTER (OUTPATIENT)
Age: 54
End: 2019-05-21

## 2019-05-21 VITALS
OXYGEN SATURATION: 95 % | SYSTOLIC BLOOD PRESSURE: 123 MMHG | DIASTOLIC BLOOD PRESSURE: 71 MMHG | HEART RATE: 61 BPM | RESPIRATION RATE: 18 BRPM

## 2019-05-21 RX ORDER — METOCLOPRAMIDE HCL 10 MG
10 TABLET ORAL
Qty: 0 | Refills: 0 | DISCHARGE
Start: 2019-05-21

## 2019-05-21 RX ADMIN — GABAPENTIN 300 MILLIGRAM(S): 400 CAPSULE ORAL at 06:11

## 2019-05-21 RX ADMIN — OXYCODONE AND ACETAMINOPHEN 1 TABLET(S): 5; 325 TABLET ORAL at 07:33

## 2019-05-21 RX ADMIN — Medication 1 DROP(S): at 10:31

## 2019-05-21 RX ADMIN — Medication 1 DROP(S): at 11:12

## 2019-05-21 RX ADMIN — Medication 81 MILLIGRAM(S): at 11:12

## 2019-05-21 RX ADMIN — CLOPIDOGREL BISULFATE 75 MILLIGRAM(S): 75 TABLET, FILM COATED ORAL at 11:12

## 2019-05-21 RX ADMIN — Medication 1 DROP(S): at 00:00

## 2019-05-21 RX ADMIN — OXYCODONE AND ACETAMINOPHEN 1 TABLET(S): 5; 325 TABLET ORAL at 00:20

## 2019-05-21 RX ADMIN — AMLODIPINE BESYLATE 10 MILLIGRAM(S): 2.5 TABLET ORAL at 06:10

## 2019-05-21 RX ADMIN — OXYCODONE AND ACETAMINOPHEN 1 TABLET(S): 5; 325 TABLET ORAL at 06:10

## 2019-05-21 RX ADMIN — LOSARTAN POTASSIUM 25 MILLIGRAM(S): 100 TABLET, FILM COATED ORAL at 06:10

## 2019-05-21 RX ADMIN — OXYCODONE AND ACETAMINOPHEN 1 TABLET(S): 5; 325 TABLET ORAL at 01:15

## 2019-05-21 RX ADMIN — HEPARIN SODIUM 7500 UNIT(S): 5000 INJECTION INTRAVENOUS; SUBCUTANEOUS at 06:11

## 2019-05-21 RX ADMIN — METHOCARBAMOL 1000 MILLIGRAM(S): 500 TABLET, FILM COATED ORAL at 06:10

## 2019-05-21 RX ADMIN — DIPHENHYDRAMINE HYDROCHLORIDE AND LIDOCAINE HYDROCHLORIDE AND ALUMINUM HYDROXIDE AND MAGNESIUM HYDRO 15 MILLILITER(S): KIT at 07:32

## 2019-05-21 RX ADMIN — Medication 25 MILLIGRAM(S): at 06:10

## 2019-05-21 NOTE — DISCHARGE NOTE NURSING/CASE MANAGEMENT/SOCIAL WORK - NSDCDPATPORTLINK_GEN_ALL_CORE
You can access the [facility] Patient Portal, offered by [facility], by registering with the following website: [email or web address]

## 2019-05-21 NOTE — DISCHARGE NOTE NURSING/CASE MANAGEMENT/SOCIAL WORK - NSDCFUADDAPPT_GEN_ALL_CORE_FT
Please follow up with Dr. Coello on June 27th at 1:30pm    Please follow up with your primary care physician.

## 2019-05-21 NOTE — DISCHARGE NOTE NURSING/CASE MANAGEMENT/SOCIAL WORK - NSDCPEPTSTRK_GEN_ALL_CORE
Stroke warning signs and symptoms/Signs and symptoms of stroke/Stroke education booklet/Call 911 for stroke/Stroke support groups for patients, families, and friends/Risk factors for stroke/Prescribed medications/Need for follow up after discharge

## 2019-05-22 ENCOUNTER — INBOUND DOCUMENT (OUTPATIENT)
Age: 54
End: 2019-05-22

## 2019-05-23 DIAGNOSIS — H10.9 UNSPECIFIED CONJUNCTIVITIS: ICD-10-CM

## 2019-05-23 DIAGNOSIS — I10 ESSENTIAL (PRIMARY) HYPERTENSION: ICD-10-CM

## 2019-05-23 DIAGNOSIS — G47.33 OBSTRUCTIVE SLEEP APNEA (ADULT) (PEDIATRIC): ICD-10-CM
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
## 2019-05-23 DIAGNOSIS — I25.2 OLD MYOCARDIAL INFARCTION: ICD-10-CM

## 2019-05-23 DIAGNOSIS — Z99.89 DEPENDENCE ON OTHER ENABLING MACHINES AND DEVICES: ICD-10-CM

## 2019-05-23 DIAGNOSIS — K29.70 GASTRITIS, UNSPECIFIED, WITHOUT BLEEDING: ICD-10-CM

## 2019-05-23 DIAGNOSIS — I63.9 CEREBRAL INFARCTION, UNSPECIFIED: ICD-10-CM

## 2019-05-23 DIAGNOSIS — I71.2 THORACIC AORTIC ANEURYSM, WITHOUT RUPTURE: ICD-10-CM
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
## 2019-05-23 DIAGNOSIS — S00.31XA ABRASION OF NOSE, INITIAL ENCOUNTER: ICD-10-CM

## 2019-05-23 DIAGNOSIS — Z95.5 PRESENCE OF CORONARY ANGIOPLASTY IMPLANT AND GRAFT: ICD-10-CM

## 2019-05-23 DIAGNOSIS — R13.13 DYSPHAGIA, PHARYNGEAL PHASE: ICD-10-CM

## 2019-05-23 DIAGNOSIS — E46 UNSPECIFIED PROTEIN-CALORIE MALNUTRITION: ICD-10-CM

## 2019-05-23 DIAGNOSIS — R73.03 PREDIABETES: ICD-10-CM

## 2019-05-23 DIAGNOSIS — K20.9 ESOPHAGITIS, UNSPECIFIED: ICD-10-CM

## 2019-05-23 DIAGNOSIS — I25.10 ATHEROSCLEROTIC HEART DISEASE OF NATIVE CORONARY ARTERY WITHOUT ANGINA PECTORIS: ICD-10-CM
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
## 2019-05-23 DIAGNOSIS — E78.5 HYPERLIPIDEMIA, UNSPECIFIED: ICD-10-CM

## 2019-05-23 DIAGNOSIS — J18.9 PNEUMONIA, UNSPECIFIED ORGANISM: ICD-10-CM

## 2019-05-23 DIAGNOSIS — I63.211 CEREBRAL INFARCTION DUE TO UNSPECIFIED OCCLUSION OR STENOSIS OF RIGHT VERTEBRAL ARTERY: ICD-10-CM

## 2019-05-24 PROCEDURE — 83735 ASSAY OF MAGNESIUM: CPT

## 2019-05-24 PROCEDURE — 93005 ELECTROCARDIOGRAM TRACING: CPT

## 2019-05-24 PROCEDURE — 80048 BASIC METABOLIC PNL TOTAL CA: CPT

## 2019-05-24 PROCEDURE — 84443 ASSAY THYROID STIM HORMONE: CPT

## 2019-05-24 PROCEDURE — 97530 THERAPEUTIC ACTIVITIES: CPT

## 2019-05-24 PROCEDURE — 97162 PT EVAL MOD COMPLEX 30 MIN: CPT

## 2019-05-24 PROCEDURE — 81001 URINALYSIS AUTO W/SCOPE: CPT

## 2019-05-24 PROCEDURE — 83036 HEMOGLOBIN GLYCOSYLATED A1C: CPT

## 2019-05-24 PROCEDURE — 74018 RADEX ABDOMEN 1 VIEW: CPT

## 2019-05-24 PROCEDURE — 82803 BLOOD GASES ANY COMBINATION: CPT

## 2019-05-24 PROCEDURE — 36415 COLL VENOUS BLD VENIPUNCTURE: CPT

## 2019-05-24 PROCEDURE — 70450 CT HEAD/BRAIN W/O DYE: CPT

## 2019-05-24 PROCEDURE — 87040 BLOOD CULTURE FOR BACTERIA: CPT

## 2019-05-24 PROCEDURE — 84145 PROCALCITONIN (PCT): CPT

## 2019-05-24 PROCEDURE — 97163 PT EVAL HIGH COMPLEX 45 MIN: CPT

## 2019-05-24 PROCEDURE — 86900 BLOOD TYPING SEROLOGIC ABO: CPT

## 2019-05-24 PROCEDURE — 80076 HEPATIC FUNCTION PANEL: CPT

## 2019-05-24 PROCEDURE — 92610 EVALUATE SWALLOWING FUNCTION: CPT

## 2019-05-24 PROCEDURE — 85730 THROMBOPLASTIN TIME PARTIAL: CPT

## 2019-05-24 PROCEDURE — 82962 GLUCOSE BLOOD TEST: CPT

## 2019-05-24 PROCEDURE — 86901 BLOOD TYPING SEROLOGIC RH(D): CPT

## 2019-05-24 PROCEDURE — L8699: CPT

## 2019-05-24 PROCEDURE — 80061 LIPID PANEL: CPT

## 2019-05-24 PROCEDURE — 86850 RBC ANTIBODY SCREEN: CPT

## 2019-05-24 PROCEDURE — 85610 PROTHROMBIN TIME: CPT

## 2019-05-24 PROCEDURE — 85027 COMPLETE CBC AUTOMATED: CPT

## 2019-05-24 PROCEDURE — 71045 X-RAY EXAM CHEST 1 VIEW: CPT

## 2019-05-24 PROCEDURE — 97112 NEUROMUSCULAR REEDUCATION: CPT

## 2019-05-24 PROCEDURE — C1764: CPT

## 2019-05-24 PROCEDURE — 97535 SELF CARE MNGMENT TRAINING: CPT

## 2019-05-24 PROCEDURE — 97164 PT RE-EVAL EST PLAN CARE: CPT

## 2019-05-24 PROCEDURE — 94660 CPAP INITIATION&MGMT: CPT

## 2019-05-24 PROCEDURE — 92612 ENDOSCOPY SWALLOW (FEES) VID: CPT

## 2019-05-24 PROCEDURE — 97116 GAIT TRAINING THERAPY: CPT

## 2019-05-24 PROCEDURE — 85025 COMPLETE CBC W/AUTO DIFF WBC: CPT

## 2019-05-24 PROCEDURE — 97110 THERAPEUTIC EXERCISES: CPT

## 2019-05-24 PROCEDURE — 92526 ORAL FUNCTION THERAPY: CPT

## 2019-05-24 PROCEDURE — 80202 ASSAY OF VANCOMYCIN: CPT

## 2019-06-26 ENCOUNTER — NON-APPOINTMENT (OUTPATIENT)
Age: 54
End: 2019-06-26

## 2019-06-26 ENCOUNTER — APPOINTMENT (OUTPATIENT)
Dept: OPHTHALMOLOGY | Facility: CLINIC | Age: 54
End: 2019-06-26
Payer: COMMERCIAL

## 2019-06-26 PROCEDURE — 92060 SENSORIMOTOR EXAMINATION: CPT

## 2019-06-26 PROCEDURE — 99204 OFFICE O/P NEW MOD 45 MIN: CPT

## 2019-06-27 ENCOUNTER — APPOINTMENT (OUTPATIENT)
Dept: NEUROLOGY | Facility: CLINIC | Age: 54
End: 2019-06-27
Payer: COMMERCIAL

## 2019-06-27 VITALS
BODY MASS INDEX: 37.22 KG/M2 | HEIGHT: 70 IN | HEART RATE: 62 BPM | SYSTOLIC BLOOD PRESSURE: 149 MMHG | WEIGHT: 260 LBS | DIASTOLIC BLOOD PRESSURE: 88 MMHG | TEMPERATURE: 97.7 F | OXYGEN SATURATION: 95 %

## 2019-06-27 DIAGNOSIS — M25.512 PAIN IN LEFT SHOULDER: ICD-10-CM

## 2019-06-27 DIAGNOSIS — H53.9 OTHER SEQUELAE FOLLOWING UNSPECIFIED CEREBROVASCULAR DISEASE: ICD-10-CM

## 2019-06-27 DIAGNOSIS — I69.998 OTHER SEQUELAE FOLLOWING UNSPECIFIED CEREBROVASCULAR DISEASE: ICD-10-CM

## 2019-06-27 PROCEDURE — 99215 OFFICE O/P EST HI 40 MIN: CPT

## 2019-06-27 RX ORDER — LISINOPRIL 10 MG/1
10 TABLET ORAL
Qty: 90 | Refills: 3 | Status: DISCONTINUED | COMMUNITY
Start: 2019-04-30 | End: 2019-06-27

## 2019-06-27 RX ORDER — ASPIRIN ENTERIC COATED TABLETS 81 MG 81 MG/1
81 TABLET, DELAYED RELEASE ORAL
Qty: 90 | Refills: 1 | Status: ACTIVE | COMMUNITY
Start: 2017-01-04 | End: 1900-01-01

## 2019-07-08 ENCOUNTER — APPOINTMENT (OUTPATIENT)
Dept: HEART AND VASCULAR | Facility: CLINIC | Age: 54
End: 2019-07-08
Payer: COMMERCIAL

## 2019-07-08 ENCOUNTER — APPOINTMENT (OUTPATIENT)
Dept: SURGERY | Facility: CLINIC | Age: 54
End: 2019-07-08
Payer: COMMERCIAL

## 2019-07-08 VITALS
HEART RATE: 58 BPM | OXYGEN SATURATION: 97 % | SYSTOLIC BLOOD PRESSURE: 149 MMHG | DIASTOLIC BLOOD PRESSURE: 77 MMHG | HEIGHT: 70 IN | BODY MASS INDEX: 37.37 KG/M2 | TEMPERATURE: 97.4 F | WEIGHT: 261 LBS

## 2019-07-08 VITALS
BODY MASS INDEX: 37.22 KG/M2 | SYSTOLIC BLOOD PRESSURE: 128 MMHG | HEIGHT: 70 IN | DIASTOLIC BLOOD PRESSURE: 70 MMHG | HEART RATE: 73 BPM | WEIGHT: 260 LBS

## 2019-07-08 DIAGNOSIS — R73.03 PREDIABETES.: ICD-10-CM

## 2019-07-08 DIAGNOSIS — I16.0 HYPERTENSIVE URGENCY: ICD-10-CM

## 2019-07-08 DIAGNOSIS — Z82.3 FAMILY HISTORY OF STROKE: ICD-10-CM

## 2019-07-08 DIAGNOSIS — Z86.39 PERSONAL HISTORY OF OTHER ENDOCRINE, NUTRITIONAL AND METABOLIC DISEASE: ICD-10-CM

## 2019-07-08 DIAGNOSIS — Z83.3 FAMILY HISTORY OF DIABETES MELLITUS: ICD-10-CM

## 2019-07-08 DIAGNOSIS — Z83.438 FAMILY HISTORY OF OTHER DISORDER OF LIPOPROTEIN METABOLISM AND OTHER LIPIDEMIA: ICD-10-CM

## 2019-07-08 DIAGNOSIS — Z80.8 FAMILY HISTORY OF MALIGNANT NEOPLASM OF OTHER ORGANS OR SYSTEMS: ICD-10-CM

## 2019-07-08 DIAGNOSIS — Z87.898 PERSONAL HISTORY OF OTHER SPECIFIED CONDITIONS: ICD-10-CM

## 2019-07-08 DIAGNOSIS — G47.33 OBSTRUCTIVE SLEEP APNEA (ADULT) (PEDIATRIC): ICD-10-CM

## 2019-07-08 DIAGNOSIS — Z82.49 FAMILY HISTORY OF ISCHEMIC HEART DISEASE AND OTHER DISEASES OF THE CIRCULATORY SYSTEM: ICD-10-CM

## 2019-07-08 PROCEDURE — 93285 PRGRMG DEV EVAL SCRMS IP: CPT

## 2019-07-08 PROCEDURE — 99213 OFFICE O/P EST LOW 20 MIN: CPT

## 2019-07-08 RX ORDER — ESCITALOPRAM OXALATE 10 MG/1
10 TABLET ORAL
Qty: 5 | Refills: 0 | Status: COMPLETED | COMMUNITY

## 2019-07-08 RX ORDER — LOSARTAN POTASSIUM 25 MG/1
25 TABLET, FILM COATED ORAL
Refills: 0 | Status: COMPLETED | COMMUNITY

## 2019-07-08 RX ORDER — METOPROLOL TARTRATE 25 MG/1
25 TABLET, FILM COATED ORAL
Refills: 0 | Status: COMPLETED | COMMUNITY

## 2019-07-08 RX ORDER — METHOCARBAMOL 500 MG/1
500 TABLET, FILM COATED ORAL
Refills: 0 | Status: COMPLETED | COMMUNITY

## 2019-07-08 RX ORDER — OXYCODONE 10 MG/1
10 TABLET ORAL
Refills: 0 | Status: COMPLETED | COMMUNITY

## 2019-07-08 RX ORDER — METOCLOPRAMIDE HYDROCHLORIDE 5 MG/5ML
5 SOLUTION ORAL
Refills: 0 | Status: COMPLETED | COMMUNITY

## 2019-07-08 RX ORDER — ATORVASTATIN CALCIUM 80 MG/1
80 TABLET, FILM COATED ORAL
Refills: 0 | Status: COMPLETED | COMMUNITY

## 2019-07-08 RX ORDER — GLUCOSAMINE HCL/CHONDROITIN SU 500-400 MG
3 CAPSULE ORAL
Qty: 60 | Refills: 2 | Status: COMPLETED | COMMUNITY

## 2019-07-08 RX ORDER — CLOPIDOGREL BISULFATE 75 MG/1
75 TABLET, FILM COATED ORAL
Qty: 30 | Refills: 6 | Status: COMPLETED | COMMUNITY

## 2019-07-08 RX ORDER — AMLODIPINE BESYLATE 2.5 MG/1
2.5 TABLET ORAL
Refills: 0 | Status: COMPLETED | COMMUNITY

## 2019-07-08 RX ORDER — GABAPENTIN 250 MG/5ML
250 SOLUTION ORAL
Refills: 0 | Status: COMPLETED | COMMUNITY

## 2019-07-10 NOTE — HISTORY OF PRESENT ILLNESS
[de-identified] : Mr. 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  \par \par He initially presented  to [facility] in [place], [place] on 5/1/19 with acute onset dizziness, gait ataxia (falling to R) and found to have subacute/acute left frontal and right cerebellar strokes, right distal vertebral artery occlusion of unclear chronicity.  Treated with tPA.  Course complicated by dysphagia; s/p PEG tube placement which is scheduled for removal today.  He is back at home following rehab course and offers no acute complaints.

## 2019-07-10 NOTE — DISCUSSION/SUMMARY
[FreeTextEntry1] : Mr. Sheppard is a 54 year-old gentleman with a history of cryptogenic stroke s/p ILR placement to monitor for occult atrial fibrillation.  No arrhythmia events have been detected since device implant.  His incision is well healed. He is enrolled in remote monitoring.  Advised to follow-up in six months or sooner as needed.  All questions were answered.

## 2019-07-10 NOTE — PROCEDURE
[de-identified] : MDT REVEAL LINQ\par Adequate sensing\par Battery OK\par No auto or symptom events

## 2019-07-11 PROBLEM — I69.998: Status: ACTIVE | Noted: 2019-07-11

## 2019-07-11 NOTE — PHYSICAL EXAM
[FreeTextEntry1] : General: sitting in wheelchair, NAD\par Eyes: anicteric sclera, glasses with stickers on outside\par CV: RRR\par Extremities: 2+ radial pulses bilaterally\par \par Neurologic:\par - Mental Status:  AAOx3; speech is fluent with intact naming, repetition, and comprehension\par - Cranial Nerves II-XII:  \par II:  PERRLA; visual fields are full to confrontation\par III, IV, VI:  EOMI, nystagmus on right gaze \par V:  facial sensation is intact in the V1-V3 distribution bilaterally.\par VII:  face is symmetric\par IX, X:  uvula is midline and soft palate rises symmetrically\par XI:  head turning and shoulder shrug are intact bilaterally\par XII:  tongue protrudes in the midline\par - Motor:  right pronator drift, strength is 5/5 throughout, though pain restriction at right shoulder; normal muscle bulk and tone throughout\par - Reflexes:  2+ and symmetric in general but some trouble with placement; plantar reflexes downgoing bilaterally\par - Sensory:  intact to light touch throughout\par - Coordination: mild dysmetria on right finger-nose-finger, heel-knee-shin intact without dysmetria\par - Gait: deferred since wheelchair

## 2019-07-11 NOTE — ASSESSMENT
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  He's much stronger now than at discharge but still has deficits in vision and ataxia.\par \par Plan: \par 1) Secondary stroke prevention - \par a) Continue dual antiplatelet on Aspirin 81mg and Plavix 75mg  \par b) Continue Atorvastatin 80mg for statin.  LDL 57\par \par 2) Stroke risk factors - He needs to follow closely with PMD given extensive medical problems.\par a) HTN - variable - needs better control - continue Amlodipine, Losartan and Metoprolol\par Note: Discontinued Lisinopril due to cough\par b) Hyperlipidemia - see above\par c) Prediabetes - hemoglobin A1C 6.0%\par d) CAD s/p NSTEMI - Aspirin for cardiac reasons\par e) OLIVA on CPAP\par \par 3) Follow up - \par a) loop recorder to rule out cardiac arrhythmias since stroke appeared embolic\par b) Referred to physical, occupational and speech therapy for further recovery\par \par 4) GI - Referred to Surgery for removal of PEG tube if not needed\par \par 5) Musculoskeletal injuries, chronic - physical and occupational therapy may assist this recovery as well.  Continue Methocarbamol 750mg daily for now.

## 2019-07-11 NOTE — DATA REVIEWED
[de-identified] : Labs (5/9/2019): \par Lipid profile: LDL 57, HDL 42, Triglycerides 89, Total 117\par Hemoglobin A1C 6.0% [de-identified] : CT Head without contrast (5/9/2019): \par 1. No CT evidence of acute intracranial hemorrhage.\par 2. Concern for acute/early subacute infarction right cerebellar hemisphere posteroinferiorly with areas of more chronic-appearing \par ischemic change within this right cerebellum medially and areas of potential acute/early subacute ischemic changes in left posterior frontal \par lobe and left parietal lobe posteriorly. \par 3. Extensive intracranial atherosclerosis involving anterior and posterior circulations.\par

## 2019-07-11 NOTE — HISTORY OF PRESENT ILLNESS
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  He was transferred from [place] where he had dizziness and gait ataxia secondary to subacute/acute left frontal and right cerebellar strokes due to occlusion of right distal vertebral artery.  He received IV tPA in [place] and later started on anticoagulation.  His course was complicated by fever, intractable hiccups that required Baclofen/Thoridazine/Reglan.  He also had dysphagia so PEG was placed by general surgery. \par \par Given thromboembolic nature of stroke but no known cause or history of Afib patient had loop recorder placed on 5/15 to determine future need for further AC as an outpatient.  \par \par He was discharged to [facility] for 4 weeks and then home on June 17th.  He plans to start as outpatient next month for speech, outpatient and patient.  \par \par Speech - nasal but good output\par - told that olfactory nerve involved\par Motor - He uses cane in the house and walker outside, though has wheel chair. \par Sensory - No hot/cold on left upper extremities.  Basic light touch intact.\par Vision - Followed up with Dr. Julien Miranda yesterday for double vision when he looks to the right.  She adjusted his glasses for better distance vision.\par Swallowing - He's able to eat now after passing modified barium swallow at rehab.  He still has the PEG tube so wants it removed by Dr. Pickard.  He wants to get electrical stimulation for it.\par - He feels fine without nausea but takes Reglan three times-a-day.  He thinks that it makes him drowsy, even at half dose.\par \par Stroke risk factors - \par HTN - His blood pressure varies - was 127//92 this morning.  He's on Norvasc 2.5mg, Losartan 25mg and Metoprolol 25mg BID.\par CAD s/p MI \par Multiple family members with blood pressure and heart disease (see H&P form)\par \par Note: He has nerve pain in his left arm and shoulder and right arm pain from the past.

## 2019-07-22 ENCOUNTER — APPOINTMENT (OUTPATIENT)
Dept: HEART AND VASCULAR | Facility: CLINIC | Age: 54
End: 2019-07-22
Payer: COMMERCIAL

## 2019-07-22 VITALS
OXYGEN SATURATION: 97 % | BODY MASS INDEX: 37.8 KG/M2 | HEART RATE: 72 BPM | TEMPERATURE: 98.3 F | WEIGHT: 264 LBS | HEIGHT: 70 IN

## 2019-07-22 DIAGNOSIS — I10 ESSENTIAL (PRIMARY) HYPERTENSION: ICD-10-CM

## 2019-07-22 PROCEDURE — 93000 ELECTROCARDIOGRAM COMPLETE: CPT

## 2019-07-22 PROCEDURE — 99203 OFFICE O/P NEW LOW 30 MIN: CPT

## 2019-07-22 PROCEDURE — 36415 COLL VENOUS BLD VENIPUNCTURE: CPT

## 2019-07-22 RX ORDER — METOPROLOL TARTRATE 25 MG/1
25 TABLET, FILM COATED ORAL TWICE DAILY
Qty: 60 | Refills: 0 | Status: DISCONTINUED | COMMUNITY
Start: 2019-06-27 | End: 2019-07-22

## 2019-07-22 NOTE — PHYSICAL EXAM
[General Appearance - Well Developed] : well developed [Normal Appearance] : normal appearance [Well Groomed] : well groomed [General Appearance - Well Nourished] : well nourished [No Deformities] : no deformities [Normal Conjunctiva] : the conjunctiva exhibited no abnormalities [General Appearance - In No Acute Distress] : no acute distress [Eyelids - No Xanthelasma] : the eyelids demonstrated no xanthelasmas [No Oral Pallor] : no oral pallor [Normal Oral Mucosa] : normal oral mucosa [No Oral Cyanosis] : no oral cyanosis [Normal Jugular Venous A Waves Present] : normal jugular venous A waves present [No Jugular Venous Macias A Waves] : no jugular venous macias A waves [Normal Jugular Venous V Waves Present] : normal jugular venous V waves present [Respiration, Rhythm And Depth] : normal respiratory rhythm and effort [Auscultation Breath Sounds / Voice Sounds] : lungs were clear to auscultation bilaterally [Heart Rate And Rhythm] : heart rate and rhythm were normal [Exaggerated Use Of Accessory Muscles For Inspiration] : no accessory muscle use [Murmurs] : no murmurs present [Heart Sounds] : normal S1 and S2 [Abdomen Tenderness] : non-tender [Abdomen Soft] : soft [Abdomen Mass (___ Cm)] : no abdominal mass palpated [FreeTextEntry1] : cane, assisted by son [Nail Clubbing] : no clubbing of the fingernails [Petechial Hemorrhages (___cm)] : no petechial hemorrhages [Cyanosis, Localized] : no localized cyanosis [] : no ischemic changes [Oriented To Time, Place, And Person] : oriented to person, place, and time [Affect] : the affect was normal [Mood] : the mood was normal [No Anxiety] : not feeling anxious

## 2019-07-22 NOTE — HISTORY OF PRESENT ILLNESS
[FreeTextEntry1] : looking to establish care, needs script refills\par chart and evants reviewed\par recent CVA, limited walking in PT

## 2019-08-15 ENCOUNTER — APPOINTMENT (OUTPATIENT)
Dept: HEART AND VASCULAR | Facility: CLINIC | Age: 54
End: 2019-08-15
Payer: COMMERCIAL

## 2019-08-15 VITALS
HEART RATE: 63 BPM | OXYGEN SATURATION: 97 % | HEIGHT: 71.26 IN | TEMPERATURE: 98.7 F | WEIGHT: 267.99 LBS | BODY MASS INDEX: 37.11 KG/M2 | DIASTOLIC BLOOD PRESSURE: 82 MMHG | SYSTOLIC BLOOD PRESSURE: 124 MMHG

## 2019-08-15 PROCEDURE — 99215 OFFICE O/P EST HI 40 MIN: CPT

## 2019-08-15 PROCEDURE — 93000 ELECTROCARDIOGRAM COMPLETE: CPT

## 2019-08-15 NOTE — DISCUSSION/SUMMARY
[FreeTextEntry1] : CAD (10/12/2017) s/p cardiac catheterization (10/13/2017) revealing LM \par normal, 30% pLAD stenosis, distal LCx luminal irregularities, mid 100% \par thrombotic RCA lesion with L to R collaterals. \par LVEF 55%, basal inferior \par hypokinesis, LVEDP 14 mmHG, 24 mm gradient across the aortic valve: mild to \par moderate aortic stenosis.\par --> cont asa, bb , arb statin high intensity no need for brilinta\par --> repeat echo\par \par CVA - f/u ILR interrogation and neuro recs appreciated\par repeat 2d echoo\par SBPT not at goal, increase norvasc tro 5mg and change losartan to PM dosing, recheck central pressure and ambi pressures in 4-6 weeks\par \par exercise encouraged, hydration encouraged\par \par

## 2019-08-15 NOTE — PHYSICAL EXAM
[No Oral Cyanosis] : no oral cyanosis [Normal Oral Mucosa] : normal oral mucosa [No Oral Pallor] : no oral pallor [Normal Jugular Venous A Waves Present] : normal jugular venous A waves present [Normal Jugular Venous V Waves Present] : normal jugular venous V waves present [No Jugular Venous Macias A Waves] : no jugular venous macias A waves [Respiration, Rhythm And Depth] : normal respiratory rhythm and effort [Exaggerated Use Of Accessory Muscles For Inspiration] : no accessory muscle use [Heart Rate And Rhythm] : heart rate and rhythm were normal [Auscultation Breath Sounds / Voice Sounds] : lungs were clear to auscultation bilaterally [Heart Sounds] : normal S1 and S2 [Murmurs] : no murmurs present [Abdomen Soft] : soft [Abdomen Mass (___ Cm)] : no abdominal mass palpated [Abdomen Tenderness] : non-tender [] : no hepato-splenomegaly

## 2019-08-15 NOTE — HISTORY OF PRESENT ILLNESS
[FreeTextEntry1] : discharged oct 2017\par 51yo M with FHx CAD (father CAD in 50s) and PMHx of HTN, OLIVA (on CPAP), \par non-obstructive CAD by cardiac catheterization 2016, pre-DM, aortic root \par aneurysm with repair 12/2016 presented to [facility] ED (10/12/2017) with unstable \par angina and R/I NSTEMI. Pt was admitted to [facility] for ACS protocol at which time \par ASA/Brilinta load was given and heparin gtt was started. CTA Dissection \par Protocol on admission: No evidence of aortic dissection or aneurysmal \par dilatation. Pt is now s/p cardiac catheterization (10/13/2017) revealing LM \par normal, 30% pLAD stenosis, distal LCx luminal irregularities, mid 100% \par thrombotic RCA lesion with L to R collaterals. LVEF 55%, basal inferior \par hypokinesis, LVEDP 14 mmHG, 24 mm gradient across the aortic valve: mild to \par moderate aortic stenosis. Pt received Thrombectomy/FADI to mRCA. Procedure was \par done via dual injection: Radial/Perclose placement. Echo on 10/14/17 shows EF \par 55%. No valve disease. \par \par 1/31/18 100% complant on DAPT self dc'ed bb due to dizziness and ed, inc exercise toelrance\par \par usoh continue dapt can stop brilinta now, sbp not at goal inc lisinopril to 10mg\par \par off brilinta\par off bb\par \par inc weight 2/2 poor eating habits and work stress\par \par 8/15/19 has CVA engd of april 2019, s/p ILR and bp med adjustments\par ambi bp not at goal mean SBP 140s, take all meds in AM and BP recordings in AM prior to meds\par no cp sob palp\par has ED wants to try cialis, and also has lipoma wants removed

## 2019-08-17 PROBLEM — Z80.8 FAMILY HISTORY OF GLIOBLASTOMA: Status: ACTIVE | Noted: 2019-08-17

## 2019-08-17 PROBLEM — Z82.49 FAMILY HISTORY OF CARDIAC DISORDER: Status: ACTIVE | Noted: 2019-08-17

## 2019-08-17 PROBLEM — Z83.3 FAMILY HISTORY OF DIABETES MELLITUS: Status: ACTIVE | Noted: 2019-08-17

## 2019-08-17 PROBLEM — Z83.438 FAMILY HISTORY OF HYPERLIPIDEMIA: Status: ACTIVE | Noted: 2019-08-17

## 2019-08-17 PROBLEM — Z82.3 FAMILY HISTORY OF STROKE: Status: ACTIVE | Noted: 2019-08-17

## 2019-08-17 PROBLEM — Z82.49 FAMILY HISTORY OF HYPERTENSION: Status: ACTIVE | Noted: 2019-08-17

## 2019-08-17 NOTE — REVIEW OF SYSTEMS
[Eye Pain] : no eye pain [Red Eyes] : eyes not red [Eyesight Problems] : eyesight problems [Discharge From Eyes] : no purulent discharge from the eyes [Dry Eyes] : no dryness of the eyes [Eyes Itch] : no itching of the eyes [Joint Pain] : no joint pain [Arthralgias] : arthralgias [Joint Swelling] : no joint swelling [Joint Stiffness] : no joint stiffness [Limb Pain] : no limb pain [Limb Swelling] : no limb swelling [Confused] : no confusion [Convulsions] : no convulsions [Dizziness] : no dizziness [Fainting] : no fainting [Limb Weakness] : no limb weakness [Difficulty Walking] : difficulty walking [Negative] : Heme/Lymph

## 2019-08-17 NOTE — ASSESSMENT
[FreeTextEntry1] : Mr. Sheppard is a 54-year-old man who underwent an endoscopy with PEG placement on May 13, 2019.  The PEG was removed today.  He is recovering as expected and will follow up with me as needed.

## 2019-08-17 NOTE — HISTORY OF PRESENT ILLNESS
[de-identified] : Mr. Sheppard previously underwent an EGD with PEG placement on May 13, 2019, after he suffered a stroke and was unable to swallow. [de-identified] : He presented today for follow up and for PEG removal.  He is no longer using the PEG for feeds or medications.  He is tolerating a diet orally.

## 2019-08-17 NOTE — PHYSICAL EXAM
[Calm] : calm [de-identified] : NAD, comfortable [de-identified] : NCAT, no scleral icterus [de-identified] : +BS soft NT ND.  No hepatosplenomegaly.  PEG site clean.  The PEG was removed with steady traction.  The PEG site was covered with a dry gauze and Mepilex Lite.  The patient tolerated the procedure well. [de-identified] : No clubbing, cyanosis, or edema. [de-identified] : Warm, dry. [de-identified] : A&Ox3

## 2019-09-04 ENCOUNTER — APPOINTMENT (OUTPATIENT)
Dept: NEUROLOGY | Facility: CLINIC | Age: 54
End: 2019-09-04
Payer: COMMERCIAL

## 2019-09-04 VITALS
BODY MASS INDEX: 37.11 KG/M2 | TEMPERATURE: 98.4 F | DIASTOLIC BLOOD PRESSURE: 85 MMHG | WEIGHT: 268 LBS | OXYGEN SATURATION: 97 % | SYSTOLIC BLOOD PRESSURE: 149 MMHG | HEART RATE: 66 BPM | HEIGHT: 71.26 IN

## 2019-09-04 DIAGNOSIS — M79.2 NEURALGIA AND NEURITIS, UNSPECIFIED: ICD-10-CM

## 2019-09-04 PROCEDURE — 99214 OFFICE O/P EST MOD 30 MIN: CPT

## 2019-09-04 RX ORDER — GABAPENTIN 600 MG/1
600 TABLET, COATED ORAL
Qty: 180 | Refills: 0 | Status: DISCONTINUED | COMMUNITY
Start: 2019-06-27 | End: 2019-09-04

## 2019-09-04 NOTE — ASSESSMENT
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  He continues to improve.  \par \par Plan: \par 1) Secondary stroke prevention - \par a) Continue dual antiplatelet on Aspirin 81mg and Plavix 75mg  \par b) Continue Atorvastatin 80mg for statin.  LDL 57\par \par 2) Stroke risk factors - He needs to follow closely with PMD given extensive medical problems.\par a) HTN - elevated - needs better control.  Attempted to contact Dr. Kaba to consider increasing Losartan to 50mg (or 25mg BID).  Awaiting input but will continue current dose of Amlodipine, Losartan and Metoprolol at this time.\par b) Hyperlipidemia - see above\par c) Prediabetes - hemoglobin A1C 6.0%\par d) CAD s/p NSTEMI - Aspirin for cardiac reasons\par e) OLIVA on CPAP\par \par 3) Continue exercises - Agree with intense therapy in [place].  Discussed exercises to decrease tone at his knees. \par 4) Follow up with Dr. Mock for loop recorder results to rule out cardiac arrhythmias since stroke appeared embolic\par \par 5) Neuropathic pain - multifactorial (stroke and musculoskeletal) - Continue Methocarbamol 750mg daily and Gabapentin for now.\par 6) Headache - possibly related to neck stiffness - Recommended that he increase Gabapentin to 800mg BID for better control.

## 2019-09-04 NOTE — HISTORY OF PRESENT ILLNESS
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 \par \par He's walking much better though still stiff in his legs.  His right ankle twist to the right so performing strengthening exercises.  He was walking about 4-5 miles with his HHA until the end of this past week.  He uses a laser to his shoulder and knees to treat pain and increase flexibility.  He can now walk down stairs without a cane.  He's going to [place] next week to work out with a  for one month to rebuild his strength.\par \par Eating full meals without any dysphagia so the PEG was removed on July 8th by Dr. Pickard.  He coughs sometimes and can have some fluids come up through his nose but not often now.  Passed his most recent MBS.\par \par He still can't tell the difference between hot and cold on the left side BUT now has warning signal.  He feels deep pain if either hot or cold, though doesn't know which it is.  The Gabapentin is helping control the nerve pain in his left shoulder, left arm and right arm.  He's also on muscle relaxant.\par \par He complains of headache on the left side of his head with freezing pain on his right cheek.  No nausea or vomiting.  The Gabapentin doesn't relieve the headaches.\par \par Aspirin and Plavix - No bleeding in urine or stool\par Atorvastatin - No new muscle pains or cramps\par \par Stroke risk factors - \par HTN - His blood pressure is usually 140-150's/80-90's upon awakening.  He's followed with Lai Oconnell and Sesar with adjustment of his antihypertensives.  \par CAD s/p MI \par Multiple family members with blood pressure and heart disease

## 2019-09-23 ENCOUNTER — RX RENEWAL (OUTPATIENT)
Age: 54
End: 2019-09-23

## 2019-10-03 ENCOUNTER — RX RENEWAL (OUTPATIENT)
Age: 54
End: 2019-10-03

## 2019-10-04 ENCOUNTER — MESSAGE (OUTPATIENT)
Age: 54
End: 2019-10-04

## 2019-10-04 ENCOUNTER — RX RENEWAL (OUTPATIENT)
Age: 54
End: 2019-10-04

## 2019-10-07 ENCOUNTER — APPOINTMENT (OUTPATIENT)
Dept: OPHTHALMOLOGY | Facility: CLINIC | Age: 54
End: 2019-10-07

## 2019-11-25 ENCOUNTER — APPOINTMENT (OUTPATIENT)
Dept: HEART AND VASCULAR | Facility: CLINIC | Age: 54
End: 2019-11-25
Payer: COMMERCIAL

## 2019-11-25 VITALS
SYSTOLIC BLOOD PRESSURE: 136 MMHG | TEMPERATURE: 99.1 F | WEIGHT: 257.98 LBS | OXYGEN SATURATION: 97 % | DIASTOLIC BLOOD PRESSURE: 80 MMHG | HEART RATE: 77 BPM | HEIGHT: 71.26 IN | BODY MASS INDEX: 35.72 KG/M2

## 2019-11-25 PROCEDURE — 99213 OFFICE O/P EST LOW 20 MIN: CPT

## 2019-11-25 PROCEDURE — 93000 ELECTROCARDIOGRAM COMPLETE: CPT

## 2019-11-25 PROCEDURE — 93306 TTE W/DOPPLER COMPLETE: CPT

## 2019-11-25 NOTE — HISTORY OF PRESENT ILLNESS
[FreeTextEntry1] : discharged oct 2017\par 51yo M with FHx CAD (father CAD in 50s) and PMHx of HTN, OLIVA (on CPAP), \par non-obstructive CAD by cardiac catheterization 2016, pre-DM, aortic root \par aneurysm with repair 12/2016 presented to [facility] ED (10/12/2017) with unstable \par angina and R/I NSTEMI. Pt was admitted to [facility] for ACS protocol at which time \par ASA/Brilinta load was given and heparin gtt was started. CTA Dissection \par Protocol on admission: No evidence of aortic dissection or aneurysmal \par dilatation. Pt is now s/p cardiac catheterization (10/13/2017) revealing LM \par normal, 30% pLAD stenosis, distal LCx luminal irregularities, mid 100% \par thrombotic RCA lesion with L to R collaterals. LVEF 55%, basal inferior \par hypokinesis, LVEDP 14 mmHG, 24 mm gradient across the aortic valve: mild to \par moderate aortic stenosis. Pt received Thrombectomy/FADI to mRCA. Procedure was \par done via dual injection: Radial/Perclose placement. Echo on 10/14/17 shows EF \par 55%. No valve disease. \par \par 1/31/18 100% complant on DAPT self dc'ed bb due to dizziness and ed, inc exercise toelrance\par \par usoh continue dapt can stop brilinta now, sbp not at goal inc lisinopril to 10mg\par \par off brilinta\par off bb\par \par inc weight 2/2 poor eating habits and work stress\par \par 8/15/19 has CVA engd of april 2019, s/p ILR and bp med adjustments\par ambi bp not at goal mean SBP 140s, take all meds in AM and BP recordings in AM prior to meds\par no cp sob palp\par has ED wants to try cialis, and also has lipoma wants removed\par \par 11/25/19 Interval Symptoms: c/o 10/10 headache, repeat bp taken by me three times at rest mean 124/60, 100% compliant with meds. \par pnd - no\par orthopnea - no\par leg edema - no\par syncope - no\par dizziness - no\par palpitations - no\par leg pain - no\par SOB - no\par chest pain - no\par \par location: chest\par duration: none\par  modifying factors: none\par timing: none\par severity: 0/10\par \par EKG unchanged from prior (in chart)\par consultation notes reviewed where appropriate\par \par Medications: the patient is adherent with his medication regimen. denies medication side effects. \par Disease Management: the patient is doing well with goals. \par \par

## 2019-11-25 NOTE — DISCUSSION/SUMMARY
[FreeTextEntry1] : The number of diagnostic and/or management options include:\par \par \par CAD - the patient is not in active chest pain, no new sob or duran, ekg is unchanged and stable. on guideline directed medical therapy.\par s/p cardiac catheterization (10/13/2017) revealing LM \par normal, 30% pLAD stenosis, distal LCx luminal irregularities, mid 100% \par thrombotic RCA lesion with L to R collaterals. \par LVEF 55%, basal inferior \par hypokinesis, LVEDP 14 mmHG, 24 mm gradient across the aortic valve: mild to \par moderate aortic stenosis.\par --> cont asa, bb , arb statin high intensity no need for brilinta\par --> repeat echo unchanged\par \par \par HTN - the patient BP is at goal, ambi bp monitor at goal, no jvd/rale/edema on exam, continue current medicines\par SBP retaken at rest at goal, pain is the driving force, will d/w neuro ?trigeminal neuralgia, no need to adjust meds for now\par \par CVA - f/u ILR interrogation and neuro recs appreciated\par \par \par CV Prevention - \par ·	Advised SBP guidelines based on ACC/AHA and SPRINT trial increased CAD PAD and mortality \par ·	Assessed willingness to attempt - contemplation stage\par ·	Agree to no added salt and added sugar diet  - prevention medicine referral, nutritionist\par ·	Assist with resources provided - prevention medicine referral, nutritionist, individual counseling\par ·	Arrange ·	Follow-up arranged - next scheduled visit\par \par \par Labs, radiology: EKG\par \par \par \par \par

## 2019-11-25 NOTE — PHYSICAL EXAM
[General Appearance - Well Developed] : well developed [Normal Appearance] : normal appearance [Well Groomed] : well groomed [General Appearance - Well Nourished] : well nourished [No Deformities] : no deformities [General Appearance - In No Acute Distress] : no acute distress [Normal Conjunctiva] : the conjunctiva exhibited no abnormalities [No Oral Pallor] : no oral pallor [Eyelids - No Xanthelasma] : the eyelids demonstrated no xanthelasmas [Normal Oral Mucosa] : normal oral mucosa [No Oral Cyanosis] : no oral cyanosis [Normal Jugular Venous V Waves Present] : normal jugular venous V waves present [Normal Jugular Venous A Waves Present] : normal jugular venous A waves present [No Jugular Venous Macias A Waves] : no jugular venous macias A waves [Exaggerated Use Of Accessory Muscles For Inspiration] : no accessory muscle use [Respiration, Rhythm And Depth] : normal respiratory rhythm and effort [Auscultation Breath Sounds / Voice Sounds] : lungs were clear to auscultation bilaterally [Heart Rate And Rhythm] : heart rate and rhythm were normal [Heart Sounds] : normal S1 and S2 [Murmurs] : no murmurs present [Abdomen Soft] : soft [Abdomen Tenderness] : non-tender [Abdomen Mass (___ Cm)] : no abdominal mass palpated [Abnormal Walk] : normal gait [Nail Clubbing] : no clubbing of the fingernails [Gait - Sufficient For Exercise Testing] : the gait was sufficient for exercise testing [Petechial Hemorrhages (___cm)] : no petechial hemorrhages [Cyanosis, Localized] : no localized cyanosis [No Venous Stasis] : no venous stasis [Skin Color & Pigmentation] : normal skin color and pigmentation [] : no rash [Skin Lesions] : no skin lesions [No Skin Ulcers] : no skin ulcer [Oriented To Time, Place, And Person] : oriented to person, place, and time [Affect] : the affect was normal [No Xanthoma] : no  xanthoma was observed [No Anxiety] : not feeling anxious [Mood] : the mood was normal

## 2019-11-26 ENCOUNTER — APPOINTMENT (OUTPATIENT)
Dept: NEUROLOGY | Facility: CLINIC | Age: 54
End: 2019-11-26
Payer: COMMERCIAL

## 2019-11-26 VITALS
SYSTOLIC BLOOD PRESSURE: 149 MMHG | HEART RATE: 70 BPM | WEIGHT: 257 LBS | OXYGEN SATURATION: 97 % | BODY MASS INDEX: 35.98 KG/M2 | HEIGHT: 71 IN | DIASTOLIC BLOOD PRESSURE: 89 MMHG

## 2019-11-26 DIAGNOSIS — K11.7 DISTURBANCES OF SALIVARY SECRETION: ICD-10-CM

## 2019-11-26 PROCEDURE — 99214 OFFICE O/P EST MOD 30 MIN: CPT

## 2019-11-26 RX ORDER — ATORVASTATIN CALCIUM 80 MG/1
80 TABLET, FILM COATED ORAL
Qty: 90 | Refills: 1 | Status: DISCONTINUED | COMMUNITY
Start: 2019-06-27 | End: 2019-11-26

## 2019-11-29 NOTE — PHYSICAL EXAM
[FreeTextEntry1] : General: sitting in exam chair, NAD\par Eyes: anicteric sclera\par CV: RRR\par Extremities: 2+ radial pulses bilaterally\par \par Neurologic:\par - Mental Status:  AAOx3; speech is fluent with intact naming, repetition, and comprehension, attention intact, fund of knowledge appropriate\par - Cranial Nerves II-XII:  \par II:  PERRLA; visual fields are full to confrontation\par III, IV, VI:  EOMI, no nystagmus \par V:  facial sensation is intact in the V1-V3 distribution bilaterally.\par VII:  face is symmetric\par IX, X:  uvula is midline and soft palate rises symmetrically\par XI:  head turning and shoulder shrug are intact bilaterally\par XII:  tongue protrudes in the midline\par - Motor:  right pronator drift, strength is 5/5 throughout, though pain restriction at right shoulder; normal muscle bulk throughout, increased tone at knees\par - Reflexes:  2+ and symmetric in general; plantar reflexes equivocal bilaterally\par - Sensory:  intact to light touch throughout; UNABLE to distinguish between sharp and dull on left upper and lower extremity\par - Coordination: mild dysmetria on right finger-nose-finger, heel-knee-shin intact without dysmetria\par - Gait: steady but relatively stiff without flexing either knee fully

## 2019-11-29 NOTE — HISTORY OF PRESENT ILLNESS
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 \par \par Continues to be on:\par Aspirin and Plavix - No bleeding in urine or stool\par Atorvastatin - No new muscle pains or cramps\par \par Does [employer] out in [place]: \par Injected Kenalog and Serapine for shoulders and left knee- in September and last few weeks the pain returned back due to a relapse. \par \par Patient started on nortriptyline for headache relief- he says since May when he had the stroke, had headaches daily. He says only intensive exercise and smoking marijuana relieved the headaches. Nortriptyline that caused his headaches to come back worse. He stopped it. \par \par Has left sided nerve pain of the left leg that won't go away since the stroke. \par \par Says his right sided motor skills have improved; including vision. He says prism glasses did not work. \par \par His main concern is his pain on the left side- shoulder to the left foot (most noticeably those two)- described it neuropathy and head. \par \par Headache always on one side on the right frontal area- it's a 10/10 and ice pick; wakes up with every day. Says only intense exercise or smoking MJ will alleviate.\par \par Neuropathic- doesn't take Gabapentin 800mg BID- only started doing that twice a week; methocarbamol 750mg BID\par \par Patient reports since the stroke, has runny nose constantly and excessive saliva- keeps swallowing.\par \par

## 2019-11-29 NOTE — ASSESSMENT
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  \par \par Plan for neuropathic pain and headaches\par -topiramate 25mg BID- drink water; discussed side effects of kidney stones\par -gabapentin 800mg BID\par -methocarbamol 750mg take 1 tab for a week and then discontinue\par \par ENT referral - excessive salivation\par \par atorvastatin 40mg d/c 80\par continue on asa and plavix- patient was on brilinta in the past,not useful. \par \par \par Dr. Coello saw this patient with me.

## 2019-12-24 ENCOUNTER — MESSAGE (OUTPATIENT)
Age: 54
End: 2019-12-24

## 2019-12-30 ENCOUNTER — MEDICATION RENEWAL (OUTPATIENT)
Age: 54
End: 2019-12-30

## 2019-12-31 ENCOUNTER — APPOINTMENT (OUTPATIENT)
Dept: HEART AND VASCULAR | Facility: CLINIC | Age: 54
End: 2019-12-31
Payer: COMMERCIAL

## 2019-12-31 VITALS
OXYGEN SATURATION: 96 % | TEMPERATURE: 98.9 F | DIASTOLIC BLOOD PRESSURE: 70 MMHG | WEIGHT: 257 LBS | SYSTOLIC BLOOD PRESSURE: 130 MMHG | HEART RATE: 65 BPM | BODY MASS INDEX: 35.84 KG/M2

## 2019-12-31 PROCEDURE — 93000 ELECTROCARDIOGRAM COMPLETE: CPT

## 2019-12-31 PROCEDURE — 99213 OFFICE O/P EST LOW 20 MIN: CPT

## 2019-12-31 NOTE — PHYSICAL EXAM
[Normal Appearance] : normal appearance [General Appearance - Well Developed] : well developed [Well Groomed] : well groomed [General Appearance - Well Nourished] : well nourished [General Appearance - In No Acute Distress] : no acute distress [No Deformities] : no deformities [Normal Oral Mucosa] : normal oral mucosa [Eyelids - No Xanthelasma] : the eyelids demonstrated no xanthelasmas [Normal Conjunctiva] : the conjunctiva exhibited no abnormalities [No Oral Cyanosis] : no oral cyanosis [No Oral Pallor] : no oral pallor [Normal Jugular Venous A Waves Present] : normal jugular venous A waves present [No Jugular Venous Macias A Waves] : no jugular venous macias A waves [Normal Jugular Venous V Waves Present] : normal jugular venous V waves present [Exaggerated Use Of Accessory Muscles For Inspiration] : no accessory muscle use [Respiration, Rhythm And Depth] : normal respiratory rhythm and effort [Heart Rate And Rhythm] : heart rate and rhythm were normal [Auscultation Breath Sounds / Voice Sounds] : lungs were clear to auscultation bilaterally [Heart Sounds] : normal S1 and S2 [Abdomen Soft] : soft [Murmurs] : no murmurs present [Abdomen Mass (___ Cm)] : no abdominal mass palpated [Abdomen Tenderness] : non-tender [Gait - Sufficient For Exercise Testing] : the gait was sufficient for exercise testing [Nail Clubbing] : no clubbing of the fingernails [Abnormal Walk] : normal gait [Petechial Hemorrhages (___cm)] : no petechial hemorrhages [Cyanosis, Localized] : no localized cyanosis [Skin Color & Pigmentation] : normal skin color and pigmentation [] : no rash [No Venous Stasis] : no venous stasis [Skin Lesions] : no skin lesions [No Skin Ulcers] : no skin ulcer [No Xanthoma] : no  xanthoma was observed [Oriented To Time, Place, And Person] : oriented to person, place, and time [Mood] : the mood was normal [No Anxiety] : not feeling anxious [Affect] : the affect was normal

## 2019-12-31 NOTE — DISCUSSION/SUMMARY
[Coronary Artery Disease] : coronary artery disease [Hypertension] : hypertension [Continue] : continuing calcium channel blockers [None] : none [Sodium Restriction] : sodium restriction [Weight Loss] : weight loss [Dietary Modification] : dietary modification [Exercise Regimen] : an exercise regimen [Weight Reduction] : weight reduction [Ambulatory BP Monitoring] : ambulatory blood pressure monitoring [ECG] : ECG [Rhythm Disorder] : rhythm disorder [Stable] : stable [Event Recording] : a patient demand event recording [Electrophysiology Study] : an electrophysiology study [Echocardiogram] : an echocardiogram

## 2020-01-02 NOTE — HISTORY OF PRESENT ILLNESS
[FreeTextEntry1] : discharged oct 2017\par 51yo M with FHx CAD (father CAD in 50s) and PMHx of HTN, OLIVA (on CPAP), \par non-obstructive CAD by cardiac catheterization 2016, pre-DM, aortic root \par aneurysm with repair 12/2016 presented to [facility] ED (10/12/2017) with unstable \par angina and R/I NSTEMI. Pt was admitted to [facility] for ACS protocol at which time \par ASA/Brilinta load was given and heparin gtt was started. CTA Dissection \par Protocol on admission: No evidence of aortic dissection or aneurysmal \par dilatation. Pt is now s/p cardiac catheterization (10/13/2017) revealing LM \par normal, 30% pLAD stenosis, distal LCx luminal irregularities, mid 100% \par thrombotic RCA lesion with L to R collaterals. LVEF 55%, basal inferior \par hypokinesis, LVEDP 14 mmHG, 24 mm gradient across the aortic valve: mild to \par moderate aortic stenosis. Pt received Thrombectomy/FADI to mRCA. Procedure was \par done via dual injection: Radial/Perclose placement. Echo on 10/14/17 shows EF \par 55%. No valve disease. \par \par 1/31/18 100% complant on DAPT self dc'ed bb due to dizziness and ed, inc exercise toelrance\par \par usoh continue dapt can stop brilinta now, sbp not at goal inc lisinopril to 10mg\par \par off brilinta\par off bb\par \par inc weight 2/2 poor eating habits and work stress\par \par 8/15/19 has CVA engd of april 2019, s/p ILR and bp med adjustments\par ambi bp not at goal mean SBP 140s, take all meds in AM and BP recordings in AM prior to meds\par no cp sob palp\par has ED wants to try cialis, and also has lipoma wants removed\par \par 11/25/19 Interval Symptoms: c/o 10/10 headache, repeat bp taken by me three times at rest mean 124/60, 100% compliant with meds. \par pnd - no\par orthopnea - no\par leg edema - no\par syncope - no\par dizziness - no\par palpitations - no\par leg pain - no\par SOB - no\par chest pain - no\par \par location: chest\par duration: none\par  modifying factors: none\par timing: none\par severity: 0/10\par \par EKG unchanged from prior (in chart)\par consultation notes reviewed where appropriate\par \par Medications: the patient is adherent with his medication regimen. denies medication side effects. \par Disease Management: the patient is doing well with goals. \par \par  12/31/19\par Interval Symptoms: EKG today unchanged from prior (in chart)\par usual state of health, no new complaints\par SOB - no\par chest pain - no\par location: chest\par duration: none\par modifying factors: none\par timing: none\par severity: 0/10\par Medications: the patient is adherent with his medication regimen. denies medication side effects. \par Disease Management: the patient is doing well with goals.

## 2020-01-03 ENCOUNTER — RX CHANGE (OUTPATIENT)
Age: 55
End: 2020-01-03

## 2020-01-06 ENCOUNTER — RX CHANGE (OUTPATIENT)
Age: 55
End: 2020-01-06

## 2020-01-07 ENCOUNTER — RX CHANGE (OUTPATIENT)
Age: 55
End: 2020-01-07

## 2020-01-08 ENCOUNTER — RX CHANGE (OUTPATIENT)
Age: 55
End: 2020-01-08

## 2020-01-13 ENCOUNTER — APPOINTMENT (OUTPATIENT)
Dept: HEART AND VASCULAR | Facility: CLINIC | Age: 55
End: 2020-01-13
Payer: COMMERCIAL

## 2020-01-13 VITALS
HEIGHT: 71 IN | BODY MASS INDEX: 35 KG/M2 | DIASTOLIC BLOOD PRESSURE: 74 MMHG | WEIGHT: 250 LBS | HEART RATE: 68 BPM | SYSTOLIC BLOOD PRESSURE: 127 MMHG

## 2020-01-13 PROCEDURE — 93290 INTERROG DEV EVAL ICPMS IP: CPT

## 2020-01-13 NOTE — HISTORY OF PRESENT ILLNESS
[de-identified] : Mr. 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  \par \par He initially presented  to [facility] in [place], [place] on 5/1/19 with acute onset dizziness, gait ataxia (falling to R) and found to have subacute/acute left frontal and right cerebellar strokes, right distal vertebral artery occlusion of unclear chronicity.  Treated with tPA.  Course complicated by dysphagia; s/p PEG tube placement which is scheduled for removal today.  He is back at home following rehab course and offers no acute complaints. \par He is planning to f/u with ENT this friday for initial evaluation for botox in throat.

## 2020-01-13 NOTE — PROCEDURE
[de-identified] : MDT REVEAL LINQ\par Adequate sensing 0.35 mA\par Battery OK\par No auto or symptom events

## 2020-01-17 ENCOUNTER — APPOINTMENT (OUTPATIENT)
Dept: OTOLARYNGOLOGY | Facility: CLINIC | Age: 55
End: 2020-01-17
Payer: COMMERCIAL

## 2020-01-17 VITALS
HEIGHT: 71 IN | SYSTOLIC BLOOD PRESSURE: 111 MMHG | OXYGEN SATURATION: 98 % | DIASTOLIC BLOOD PRESSURE: 71 MMHG | WEIGHT: 245 LBS | HEART RATE: 73 BPM | BODY MASS INDEX: 34.3 KG/M2

## 2020-01-17 DIAGNOSIS — I69.391 DYSPHAGIA FOLLOWING CEREBRAL INFARCTION: ICD-10-CM

## 2020-01-17 DIAGNOSIS — J30.9 ALLERGIC RHINITIS, UNSPECIFIED: ICD-10-CM

## 2020-01-17 DIAGNOSIS — K21.9 GASTRO-ESOPHAGEAL REFLUX DISEASE W/OUT ESOPHAGITIS: ICD-10-CM

## 2020-01-17 DIAGNOSIS — K13.79 OTHER LESIONS OF ORAL MUCOSA: ICD-10-CM

## 2020-01-17 DIAGNOSIS — R09.81 NASAL CONGESTION: ICD-10-CM

## 2020-01-17 PROCEDURE — 31579 LARYNGOSCOPY TELESCOPIC: CPT

## 2020-01-17 PROCEDURE — 99205 OFFICE O/P NEW HI 60 MIN: CPT | Mod: 25

## 2020-01-17 NOTE — HISTORY OF PRESENT ILLNESS
[de-identified] : 55 yo male who presents with concern for increase mucus production in the throat.  Pt is status post CVA in May, 2019.  Since that time the patient has had issues with his swallowing.  He has worked with speech pathologists and had vital-stim therapy. At this point he is on a regular diet with full liquids.  He was told that he isn't getting penetration or aspiration when swallowing, though he has "issues with epiglottis causing nasal regurgitation of foods."  He is getting adequate nutrition daily, though he is losing  That being said, he complains of increased mucus production and mucus getting stuck in his throat.  He denies any increased saliva in the oral cavity, no drooling.  He is able to articulate when speaking, though there is a slight deficit.  His voice is not garbled or wet sounding.  No breathing issues.  No odynophagia or throat pain.  Currently in terms of diet, he does drink coffee, tomatoes, tomato sauce and blueberry.  he notes that alcohol can cause a burning sensation.\par \par He also complains of nasal congestion, and rhinorrhea.  He does have known right sided headaches after the stroke, but no facial pain over the sinuses and no dental pain.  He states there was concern for trigeminal neuralgia.  Slight decrease in smell and taste at times, though this is improving.  No otologic symptoms.

## 2020-01-17 NOTE — ASSESSMENT
[FreeTextEntry1] : 53 yo male who presents with concern for increased mucus production.  After history taking I don't believe that this is a saliva or oral issue, but rather mucus in the throat.  This is likely related to pharyngeal weakness from insult related to previous stroke.  There was also question of the epiglottis causing nasal regurgitation, but I believe this might be velopharyngeal insufficiency and not the epiglottis.  Regardless, I am recommending consultation with our speech pathologist for further evaluation.  He may need a repeat modified Barium Swallow to see how he has progressed.  I also recommended dietary and behavioral modification to reduce acid in the diet (does not want to start medication at this time).  Additionally I requested that he send over previous records from [facility] and Swallow studies.\par \par In terms of nasal congestion I am currently recommending nasal saline and nasal steroids.  Will further evaluation hypernasality at next visit.\par \par -consult Cristy Ling, [facility]\par -LPR sheet given\par -Pt to send previous records\par -Further management options to follow, once the above is completed. \par -Nasal saline, nasal steroids

## 2020-01-28 ENCOUNTER — APPOINTMENT (OUTPATIENT)
Dept: NEUROLOGY | Facility: CLINIC | Age: 55
End: 2020-01-28
Payer: COMMERCIAL

## 2020-01-28 VITALS
TEMPERATURE: 98.7 F | HEART RATE: 62 BPM | HEIGHT: 71 IN | WEIGHT: 240 LBS | OXYGEN SATURATION: 98 % | SYSTOLIC BLOOD PRESSURE: 131 MMHG | BODY MASS INDEX: 33.6 KG/M2 | DIASTOLIC BLOOD PRESSURE: 77 MMHG

## 2020-01-28 DIAGNOSIS — R51 HEADACHE: ICD-10-CM

## 2020-01-28 PROCEDURE — 99214 OFFICE O/P EST MOD 30 MIN: CPT | Mod: GT

## 2020-01-28 RX ORDER — ESCITALOPRAM OXALATE 10 MG/1
10 TABLET ORAL DAILY
Qty: 90 | Refills: 1 | Status: DISCONTINUED | COMMUNITY
Start: 2019-06-27 | End: 2020-01-28

## 2020-01-28 RX ORDER — TOPIRAMATE 25 MG/1
25 TABLET, FILM COATED ORAL
Qty: 60 | Refills: 3 | Status: DISCONTINUED | COMMUNITY
Start: 2019-11-26 | End: 2020-01-28

## 2020-01-28 RX ORDER — METHOCARBAMOL 750 MG/1
750 TABLET, FILM COATED ORAL TWICE DAILY
Qty: 180 | Refills: 0 | Status: DISCONTINUED | COMMUNITY
Start: 2019-06-27 | End: 2020-01-28

## 2020-01-28 RX ORDER — GABAPENTIN 800 MG/1
800 TABLET, COATED ORAL
Qty: 60 | Refills: 5 | Status: DISCONTINUED | COMMUNITY
Start: 2019-09-04 | End: 2020-01-28

## 2020-02-21 ENCOUNTER — APPOINTMENT (OUTPATIENT)
Dept: RADIOLOGY | Facility: HOSPITAL | Age: 55
End: 2020-02-21
Payer: COMMERCIAL

## 2020-02-21 ENCOUNTER — OUTPATIENT (OUTPATIENT)
Dept: OUTPATIENT SERVICES | Facility: HOSPITAL | Age: 55
LOS: 1 days | End: 2020-02-21
Payer: COMMERCIAL

## 2020-02-21 DIAGNOSIS — Z98.890 OTHER SPECIFIED POSTPROCEDURAL STATES: Chronic | ICD-10-CM

## 2020-02-21 DIAGNOSIS — N62 HYPERTROPHY OF BREAST: Chronic | ICD-10-CM

## 2020-02-21 PROCEDURE — 92611 MOTION FLUOROSCOPY/SWALLOW: CPT | Mod: GN

## 2020-02-21 PROCEDURE — 74230 X-RAY XM SWLNG FUNCJ C+: CPT

## 2020-02-21 PROCEDURE — 74230 X-RAY XM SWLNG FUNCJ C+: CPT | Mod: 26

## 2020-02-26 PROBLEM — R51 HEADACHE: Status: ACTIVE | Noted: 2019-09-04

## 2020-02-26 NOTE — PHYSICAL EXAM
[FreeTextEntry1] : General: sitting in exam chair, NAD\par Eyes: anicteric sclera\par CV: RRR\par Extremities: 2+ radial pulses bilaterally\par \par Neurologic:\par - Mental Status:  AAOx3; speech is fluent with intact naming, repetition, and comprehension, attention intact, fund of knowledge appropriate\par - Cranial Nerves II-XII:  \par II:  PERRLA; visual fields are full to confrontation\par III, IV, VI:  EOMI, no nystagmus \par V:  facial sensation is intact in the V1-V3 distribution bilaterally.\par VII:  face is symmetric\par IX, X:  uvula is midline and soft palate rises symmetrically\par XI:  head turning and shoulder shrug are intact bilaterally\par XII:  tongue protrudes in the midline\par - Motor:  right pronator drift, strength is 5/5 throughout, though pain restriction at right shoulder; normal muscle bulk throughout, increased tone at knees (right more than left)\par - Reflexes:  2+ and symmetric in general; plantar reflexes equivocal bilaterally\par - Sensory:  intact to light touch throughout; UNABLE to distinguish between sharp and dull on left upper and lower extremity\par - Coordination: mild dysmetria on right finger-nose-finger, heel-knee-shin intact without dysmetria\par - Gait: steady but relatively stiff without flexing either knee fully

## 2020-02-26 NOTE — HISTORY OF PRESENT ILLNESS
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 \par \par His body seems like its healing some days and other days no change.  He still has some cloudiness on right side of head.  He still knocks into the wall sometimes.  He's doing yoga, as well exercise in the gym.  He can't stand on one foot.  Still has numbness in the fingers, left more than right.\par \par ENT - waiting on MBS results from [place].  He does saline risk with Flonase.  \par - He hasn't received call from Speech  therapist for treatment of uvula.\par - Question if vocal cords moving properly\par Question of trouble with his own saliva.  He thinks that he's still choking on his own saliva.\par \par Secondary stroke prevention\par Aspirin and Plavix - No bleeding in urine or stool.  Some increased bleeding in past few weeks, even if small nicks.\par Atorvastatin - No new muscle pains or cramps\par \par Stroke risk factors - \par HTN - controlled on current regimen\par CAD - follows closely with his doctors\par \par In terms of the headaches, he stopped Topamax and Gabapentin after 5 weeks due to lack of taste on the Topamax.  He's thinking of seeing Neuropharmacologist, Dr. Clayton.

## 2020-02-26 NOTE — ASSESSMENT
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  His neurological exam is stable.  \par \par Plan for stroke - \par 1) Secondary stroke prevention - \par a) Continue Aspirin 81mg and Plavix 75mg for antiplatelet\par b) Continue Atorvastatin 40mg for statin\par \par 2) Stroke risk factors being managed by his PMD.\par 3) Continue workup for dysphagia with Thad and ENT.\par 4) Encouraged exercise as part of healthy lifestyle.\par \par Plan for neuropathic pain and headaches - He's tried several different medication regimens without relief.  No contraindication to second opinion with Neuropharmacologist.

## 2020-03-18 ENCOUNTER — RX RENEWAL (OUTPATIENT)
Age: 55
End: 2020-03-18

## 2020-04-08 NOTE — ED PROVIDER NOTE - CROS ED CARDIOVAS ALL NEG
"Jimbo Walker (83 y.o. Female)     Date of Birth Social Security Number Address Home Phone MRN    1937  [id number] [place] [id number] [phone] [id number]    Congregation Marital Status          None        Admission Date Admission Type Admitting Provider Attending Provider Department, Room/Bed    4/3/20 Emergency Galilea Campos DO Hamilton, Olivia D, DO [facility] 5H, S568/1    Discharge Date Discharge Disposition Discharge Destination         Home or Self Care              Attending Provider:  Galilea Campos DO    Allergies:  Hydrocodone, Oxycodone, Penicillins, Sulfa Antibiotics, Doxycycline, Lyrica [Pregabalin]    Isolation:  None   Infection:  None   Code Status:  No CPR    Ht:  149.9 cm (59\")   Wt:  54.3 kg (119 lb 9.6 oz)    Admission Cmt:  None   Principal Problem:  Acute blood loss anemia [D62]                 Active Insurance as of 4/3/2020     Primary Coverage     Payor Plan Insurance Group Employer/Plan Group    MEDICARE MEDICARE A & B      Payor Plan Address Payor Plan Phone Number Payor Plan Fax Number Effective Dates    [address] [phone]  2004 - None Entered    [place] [address]       Subscriber Name Subscriber Birth Date Member ID       JIMBO WALKER 1937 [id number]                 Emergency Contacts      (Rel.) Home Phone Work Phone Mobile Phone    Maribel López (Daughter) -- -- [phone]               Discharge Summary      Nicki Hernandez PA-C at 20 0901              [facility] Medicine Services  DISCHARGE SUMMARY    Patient Name: Jimbo Walker  : 1937  MRN: [id number]    Date of Admission: 4/3/2020 12:28 PM  Date of Discharge:  2020  Primary Care Physician: Provider, No Known    Consults     Date and Time Order Name Status Description    2020 0832 Inpatient Radiation Oncology Consult      2020 0943 Inpatient Palliative Care MD Consult Completed     2020 " 0030 Hematology & Oncology Inpatient Consult Completed     4/3/2020 1709 Inpatient Gastroenterology Consult Completed           Hospital Course     Presenting Problem:   Rectal bleeding [K62.5]  Rectal bleeding [K62.5]    Active Hospital Problems    Diagnosis  POA   • Rectal cancer metastasized to intra-abdominal lymph node (CMS/HCC) [C20, C77.2]  Yes   • Rectal bleeding [K62.5]  Yes   • Mixed hyperlipidemia [E78.2]  Yes   • Hydronephrosis [N13.30]  Yes   • Essential hypertension [I10]  Yes      Resolved Hospital Problems    Diagnosis Date Resolved POA   • **Acute blood loss anemia [D62] 04/08/2020 Yes          Hospital Course:  Loni Walker is a 83 y.o. female with CVA, HTN, migraines, osteoarthritis, CAD, HLD, carotid stenosis, urinary retention who was admitted on 4/3/2020 due to rectal bleeding and abdominal pain.  On admission her hemoglobin and hematocrit were found to be 7.8 and 25.8.  Her CT abdomen pelvis with contrast revealed abnormal wall thickening of the rectum.  Adenopathy throughout the retroperitoneum both findings concerning for malignancy with metastasis.  There is also moderate dilation of the right renal collecting system to the UVJ.  No evidence of obstructing stone.  She also underwent CT chest which showed area of nodular scarring posterior to the left upper lobe measuring 8 mm and a 4 mm focus on the right lower lobe superior portion.  The patient received 2 units of packed red blood cells with an appropriate response of H&H after transfusion.  Her iron level was low at 17 and she received 3 doses of IV iron.  Prior to admission the patient was on Plavix and this was held due to anemia and bleeding.  The patient was started on Protonix 40 mg twice daily and gastroenterology was consulted to evaluate her.  She underwent colonoscopy on 4/5/2020 which revealed small mass in the rectum posteriorly starting at the anal verge bleeding site biopsied.  Very few sigmoid diverticuli.  Her rectal mass  "pathology revealed infiltrating squamous carcinoma.  She was evaluated by oncology  who felt that her rectal cancer was metastasized and did not feel she was a candidate for chemotherapy.  He is recommending palliative approach.  The patient was apprehensive to \"hospice.\"  However, she agreed to have palliative care evaluate her.  Palliative care evaluated the patient on 4/7/2020 and recommending them following her care at [facility].  Daughter is requesting Hospice evaluation at nursing facility and stating we should avoid using the word \"hospice.\"        Discharge Follow Up Recommendations for outpatient labs/diagnostics:  Follow up with Palliative Care.  Hospice to follow at home.      Day of Discharge     HPI:   Ms. Walker reports mild abdominal pain since yesterday.  She reports no bowel movement in 3 days.  She denies nausea, vomiting, cough, shortness of breath, fever or chills.      Review of Systems  Gen- No fevers, chills  CV- No chest pain, palpitations  Resp- No cough, dyspnea  GI- positive for mild abdominal pain; negative for nausea/vomiting      Vital Signs:   Temp:  [97.7 °F (36.5 °C)] 97.7 °F (36.5 °C)  Heart Rate:  [79] 79  Resp:  [18] 18  BP: (147-174)/(89-91) 147/89     Physical Exam:  Constitutional: No acute distress, awake, alert  HENT: NCAT, mucous membranes moist  Respiratory: Clear to auscultation bilaterally, respiratory effort normal   Cardiovascular: RRR, no murmurs, rubs, or gallops, palpable pedal pulses bilaterally  Gastrointestinal: Positive bowel sounds, soft, nontender, nondistended  Musculoskeletal: No bilateral ankle edema  Psychiatric: Appropriate affect, cooperative  Neurologic: Oriented x 3, Cranial Nerves grossly intact to confrontation, speech clear  Skin: No rashes      Pertinent  and/or Most Recent Results     Results from last 7 days   Lab Units 04/08/20  0357 04/07/20  0423 04/06/20  1743 04/06/20  0434 04/05/20  0742 04/04/20  2341 " 04/04/20  1830 04/04/20  1105  04/03/20  1304 04/03/20  1303   WBC 10*3/mm3 9.24 10.15  --  11.75*  --   --   --  9.97  --  7.67  --    HEMOGLOBIN g/dL 10.5* 9.9*  --  10.6* 11.0* 10.4* 10.2* 10.1*   < > 7.8*  --    HEMATOCRIT % 34.3 32.7*  --  33.7* 34.3 33.5* 32.2* 31.8*   < > 25.8*  --    PLATELETS 10*3/mm3 261 263  --  291  --   --   --  279  --  284  --    SODIUM mmol/L 135* 138  --  139 138  --   --  139  --   --  137   POTASSIUM mmol/L 4.1 3.8 4.3 3.4* 3.4*  --   --  4.1  --   --  4.2   CHLORIDE mmol/L 97* 103  --  102 102  --   --  102  --   --  100   CO2 mmol/L 26.0 25.0  --  24.0 22.0  --   --  26.0  --   --  27.0   BUN mg/dL 8 7*  --  4* 3*  --   --  8  --   --  12   CREATININE mg/dL 0.64 0.58  --  0.56* 0.50*  --   --  0.60  --   --  0.82   GLUCOSE mg/dL 110* 117*  --  121* 115*  --   --  116*  --   --  127*   CALCIUM mg/dL 9.7 9.1  --  9.4 9.2  --   --  9.2  --   --  9.2    < > = values in this interval not displayed.     Results from last 7 days   Lab Units 04/04/20  1105 04/03/20  1304 04/03/20  1303   BILIRUBIN mg/dL 0.6  --  0.2   ALK PHOS U/L 70  --  69   ALT (SGPT) U/L 6  --  7   AST (SGOT) U/L 14  --  13   PROTIME Seconds  --  13.6  --    INR   --  1.07  --            Invalid input(s): TG, LDLCALC, LDLREALC  Results from last 7 days   Lab Units 04/03/20  1304 04/03/20  1303   HEMOGLOBIN A1C % 5.40  --    PROBNP pg/mL  --  486.4   TROPONIN T ng/mL  --  <0.010       Brief Urine Lab Results  (Last result in the past 365 days)      Color   Clarity   Blood   Leuk Est   Nitrite   Protein   CREAT   Urine HCG        04/03/20 1342 Yellow Clear Negative Negative Negative Negative               Microbiology Results Abnormal     None          Imaging Results (All)     Procedure Component Value Units Date/Time    CT Chest Without Contrast [[id number]] Collected:  04/06/20 1624     Updated:  04/07/20 1159    Narrative:       EXAMINATION: CT CHEST WO CONTRAST-04/06/2020:      INDICATION: Rectal cancer staging;  K62.5-Hemorrhage of anus and rectum;  D64.9-Anemia, unspecified; R53.1-Weakness.      TECHNIQUE: CT chest without intravenous contrast.     The radiation dose reduction device was turned on for each scan per the  ALARA (As Low as Reasonably Achievable) protocol.     COMPARISON: CT abdomen and pelvis dated 04/03/2020.     FINDINGS: The thyroid is mildly heterogeneous in attenuation. No bulky  mediastinal adenopathy. Central airways are patent. Esophagus in normal  course and caliber. Atherosclerotic nonaneurysmal thoracic aorta.  Cardiac size borderline enlarged without pericardial effusion. Extended  lung windows demonstrate respiratory motion artifact without focal  consolidation. Area of likely nodular scarring left upper lobe 8 mm  adjacent to the fissure along with a subcentimeter focus of nodular  density right lower lobe measuring 4 mm in the superior portion. No  pleural effusion or coalescent consolidation. Degenerative changes of  the spine without aggressive osseous lesion. No soft tissue body wall  findings of concern. The visualized portions of the upper abdomen  unremarkable status post cholecystectomy.       Impression:       Area of likely nodular scarring posteriorly within the left  upper lobe measuring 8 mm along with a subcentimeter focus 4 mm in size  in the right lower lobe superior portion of indeterminate significance  may represent postinflammatory findings or nodular scarring with  moderate degree of respiratory motion somewhat limiting evaluation.  Attention at these sites on followup to exclude for pulmonary nodularity  and to ensure stability.     D:  04/06/2020  E:  04/06/2020           This report was finalized on 4/7/2020 11:56 AM by Dr. Gustavo Craig.       XR Chest 1 View [[id number]] Collected:  04/03/20 1501     Updated:  04/04/20 0926    Narrative:          EXAMINATION: XR CHEST 1 VW - 04/03/2020     INDICATION: GI bleed, weakness.     COMPARISON: 03/06/2017     FINDINGS: Portable  chest reveals heart to be enlarged. There is  increased pulmonary vascularity bilaterally. No focal parenchymal  opacification is present. No pleural effusion or pneumothorax. The bony  structures are unremarkable. The pulmonary vascularity is within normal  limits.       Impression:       Prominence of the pulmonary vascularity. Heart is enlarged.  No acute parenchymal disease.     DICTATED:   04/03/2020  EDITED/ls :   04/03/2020      This report was finalized on 4/4/2020 9:23 AM by Dr. Inés Briscoe MD.       CT Abdomen Pelvis With Contrast [[id number]] Collected:  04/03/20 1431     Updated:  04/03/20 1534    Narrative:       EXAMINATION: CT ABDOMEN/PELVIS W CONTRAST - 04/03/2020      INDICATION: Generalized abdominal pain.     TECHNIQUE: Multiple axial CT imaging is obtained of the abdomen and  pelvis following the administration of intravenous contrast.      The radiation dose reduction device was turned on for each scan per the  ALARA (As Low as Reasonably Achievable) protocol.     COMPARISON: None.     FINDINGS: Lung bases are clear. Small retrocrural lymph nodes identified  adjacent the aorta at the diaphragmatic hiatus. No bulky adenopathy.  Liver is homogeneous in appearance. The spleen is unremarkable. The  kidneys and adrenal glands are within normal limits. There is a small  renal artery aneurysm identified on the right with thin-walled  calcification measuring 1.6 cm. There is adenopathy identified  throughout the retroperitoneum. The largest left periaortic lymph node  measures 2 cm in size. Surgical clips seen in the right upper quadrant  from prior cholecystectomy. There is incomplete distention identified of  the stomach. Pancreas is homogeneous in appearance. There is moderate  distention identified of the right kidney. There is dilatation  identified of the right ureter to the right UVJ where there is no  evidence of a stone. The left kidney is unremarkable in appearance.  Vascular  calcification seen within the abdominal aorta and iliac  vessels. No free fluid or free air.     PELVIS: Pelvic organs are unremarkable. The pelvic portion of the  gastrointestinal tract reveals abnormal wall thickening seen of the  rectum. A rectal malignancy cannot be excluded. There is no bulky pelvic  adenopathy. No free fluid or free air. Mild distention of the bladder.  No abnormal mass or fluid collections identified. Degenerative changes  seen within the spine and pelvis. Curvature identified the spine with  convexity to the right. Delayed imaging reveals contrast seen in the  renal collecting systems bilaterally as well as within the ureters and  bladder.       Impression:       1. Abnormal wall thickening identified of the rectum and malignancy  cannot be excluded. Correlation to colonoscopy is recommended for  further evaluation. Adenopathy identified throughout the  retroperitoneum. Findings concerning for metastatic disease.  3. Negative for pneumonia at the lung bases.  4. Moderate dilatation seen of the right renal collecting system to the  UVJ where there is no evidence of obstruction or obstructing lesion.  Contrast seen to the bladder.     DICTATED:   04/03/2020  EDITED/ls :   04/03/2020      This report was finalized on 4/3/2020 3:31 PM by Dr. Inés Briscoe MD.                            Plan for Follow-up of Pending Labs/Results:     Discharge Details        Discharge Medications      New Medications      Instructions Start Date   rOPINIRole 0.25 MG tablet  Commonly known as:  REQUIP   0.25 mg, Oral, Nightly, Take 1 hour before bedtime.         Changes to Medications      Instructions Start Date   ALPRAZolam 0.5 MG tablet  Commonly known as:  XANAX  What changed:  when to take this   0.25 mg, Oral, 2 Times Daily PRN      ferrous sulfate 325 (65 FE) MG tablet  What changed:  Another medication with the same name was removed. Continue taking this medication, and follow the directions you see  here.   325 mg, Oral, 2 Times Daily      omeprazole 40 MG capsule  Commonly known as:  PrilOSEC  What changed:  when to take this   40 mg, Oral, Daily         Continue These Medications      Instructions Start Date   acetaminophen 500 MG tablet  Commonly known as:  TYLENOL   500 mg, Oral, Every 4 Hours PRN      amLODIPine 5 MG tablet  Commonly known as:  NORVASC   5 mg, Oral, Daily      atorvastatin 20 MG tablet  Commonly known as:  LIPITOR   20 mg, Oral, Daily      busPIRone 5 MG tablet  Commonly known as:  BUSPAR   5 mg, Oral, 2 Times Daily      cloNIDine 0.1 MG tablet  Commonly known as:  CATAPRES   0.1 mg, Oral, 2 Times Daily, As needed of SBP> 180       clopidogrel 75 MG tablet  Commonly known as:  PLAVIX   75 mg, Oral, Daily      divalproex 250 MG 24 hr tablet  Commonly known as:  DEPAKOTE   125 mg, Oral, 2 Times Daily      HYDROcodone-acetaminophen  MG per tablet  Commonly known as:  NORCO   1 tablet, Oral, Every 6 Hours PRN      loperamide 2 MG capsule  Commonly known as:  IMODIUM   2 mg, Oral, Every 6 Hours PRN      melatonin 3 MG tablet   3 mg, Oral, Nightly      MULTIVITAMIN PO   1 tablet, Oral, Daily      ondansetron 4 MG tablet  Commonly known as:  ZOFRAN   4 mg, Oral, Every 6 Hours PRN      oxybutynin 5 MG tablet  Commonly known as:  DITROPAN   5 mg, Oral, 3 Times Daily      polyethylene glycol packet  Commonly known as:  MIRALAX   17 g, Oral, Daily      rivastigmine 4.6 MG/24HR patch  Commonly known as:  EXELON   1 patch, Transdermal, Daily      senna 8.6 MG tablet  Commonly known as:  SENOKOT   1 tablet, Oral, Daily      sertraline 100 MG tablet  Commonly known as:  ZOLOFT   100 mg, Oral, Daily      traZODone 50 MG tablet  Commonly known as:  DESYREL   50 mg, Oral, Nightly      vitamin C 250 MG tablet  Commonly known as:  ASCORBIC ACID   250 mg, Oral, Daily      vitamin D3 125 MCG (5000 UT) capsule capsule   2,000 Units, Oral, Daily         Stop These Medications    dilTIAZem 120 MG  tablet  Commonly known as:  CARDIZEM     dilTIAZem  MG 24 hr capsule  Commonly known as:  CARDIZEM CD     dimethicone 1.3 % lotion lotion  Commonly known as:  AVEENO     doxazosin 2 MG tablet  Commonly known as:  CARDURA     hydrocortisone 2.5 % cream     magnesium oxide 400 (241.3 Mg) MG tablet tablet  Commonly known as:  MAGOX            Allergies   Allergen Reactions   • Hydrocodone Hives   • Oxycodone Hives   • Penicillins Hives   • Sulfa Antibiotics Hives   • Doxycycline    • Lyrica [Pregabalin]          Discharge Disposition: home with hospice to [facility] facility    Diet:  Hospital:  Diet Order   Procedures   • Diet Regular       Activity: as tolerated  Activity Instructions     Activity as Tolerated            Restrictions or Other Recommendations:  none       CODE STATUS:    Code Status and Medical Interventions:   Ordered at: 04/03/20 1920     Limited Support to NOT Include:    Antiarrhythmic Drugs    Cardioversion/Defibrillation    Intubation     Level Of Support Discussed With:    Patient     Code Status:    No CPR     Medical Interventions (Level of Support Prior to Arrest):    Limited       Future Appointments   Date Time Provider Department Center   4/8/2020  3:00 PM  [facility]  [facility]   4/8/2020  4:15 PM  [facility]  [facility]       Additional Instructions for the Follow-ups that You Need to Schedule     Call MD With Problems / Concerns   As directed      Discharge Follow-up with Specified Provider: Follow up with Palliative Care.   As directed      To:  Follow up with Palliative Care.               Time Spent on Discharge:  60 minutes    Electronically signed by Nicki Hernandez PA-C, 04/08/20, 9:01 AM.        Electronically signed by Nicki Hernandez PA-C at 04/08/20 1007        Statement Selected negative...

## 2020-05-12 ENCOUNTER — APPOINTMENT (OUTPATIENT)
Dept: HEART AND VASCULAR | Facility: CLINIC | Age: 55
End: 2020-05-12
Payer: SELF-PAY

## 2020-05-12 VITALS
SYSTOLIC BLOOD PRESSURE: 122 MMHG | HEIGHT: 71 IN | WEIGHT: 240 LBS | DIASTOLIC BLOOD PRESSURE: 70 MMHG | HEART RATE: 75 BPM | TEMPERATURE: 98.6 F | BODY MASS INDEX: 33.6 KG/M2 | OXYGEN SATURATION: 97 %

## 2020-05-12 DIAGNOSIS — I71.2 THORACIC AORTIC ANEURYSM, W/OUT RUPTURE: ICD-10-CM

## 2020-05-12 DIAGNOSIS — Z00.00 ENCOUNTER FOR GENERAL ADULT MEDICAL EXAMINATION W/OUT ABNORMAL FINDINGS: ICD-10-CM

## 2020-05-12 DIAGNOSIS — Z98.890 OTHER SPECIFIED POSTPROCEDURAL STATES: ICD-10-CM

## 2020-05-12 PROCEDURE — 99215 OFFICE O/P EST HI 40 MIN: CPT

## 2020-05-12 PROCEDURE — 93000 ELECTROCARDIOGRAM COMPLETE: CPT

## 2020-05-12 PROCEDURE — ZZZZZ: CPT

## 2020-05-12 RX ORDER — AMLODIPINE BESYLATE 5 MG/1
5 TABLET ORAL
Qty: 90 | Refills: 3 | Status: COMPLETED | COMMUNITY
Start: 2019-06-27 | End: 2020-05-12

## 2020-05-12 NOTE — PHYSICAL EXAM
[General Appearance - Well Developed] : well developed [Normal Appearance] : normal appearance [Well Groomed] : well groomed [General Appearance - Well Nourished] : well nourished [No Deformities] : no deformities [General Appearance - In No Acute Distress] : no acute distress [Normal Conjunctiva] : the conjunctiva exhibited no abnormalities [Eyelids - No Xanthelasma] : the eyelids demonstrated no xanthelasmas [Normal Oral Mucosa] : normal oral mucosa [No Oral Pallor] : no oral pallor [No Oral Cyanosis] : no oral cyanosis [Normal Jugular Venous A Waves Present] : normal jugular venous A waves present [Normal Jugular Venous V Waves Present] : normal jugular venous V waves present [No Jugular Venous Macias A Waves] : no jugular venous macias A waves [Respiration, Rhythm And Depth] : normal respiratory rhythm and effort [Exaggerated Use Of Accessory Muscles For Inspiration] : no accessory muscle use [Auscultation Breath Sounds / Voice Sounds] : lungs were clear to auscultation bilaterally [Heart Rate And Rhythm] : heart rate and rhythm were normal [Heart Sounds] : normal S1 and S2 [Murmurs] : no murmurs present [Abdomen Soft] : soft [Abdomen Tenderness] : non-tender [Abdomen Mass (___ Cm)] : no abdominal mass palpated [Abnormal Walk] : normal gait [Gait - Sufficient For Exercise Testing] : the gait was sufficient for exercise testing [Nail Clubbing] : no clubbing of the fingernails [Cyanosis, Localized] : no localized cyanosis [Petechial Hemorrhages (___cm)] : no petechial hemorrhages [Skin Color & Pigmentation] : normal skin color and pigmentation [] : no rash [No Venous Stasis] : no venous stasis [Skin Lesions] : no skin lesions [No Skin Ulcers] : no skin ulcer [No Xanthoma] : no  xanthoma was observed [Oriented To Time, Place, And Person] : oriented to person, place, and time [Affect] : the affect was normal [Mood] : the mood was normal [No Anxiety] : not feeling anxious

## 2020-05-14 NOTE — DISCUSSION/SUMMARY
[Hypertension] : hypertension [Outpatient Evaluation] : outpatient evaluation [Ambulatory BP Monitoring] : ambulatory blood pressure monitoring [ECG] : ECG [Coronary Artery Disease] : coronary artery disease [Improving] : improving [Medication Changes Per Orders] : as documented in orders [Hyperlipidemia] : hyperlipidemia [Stable] : stable [Continue] : continuing statins [Deteriorating] : deteriorating [Not Responding to Treatment] : not responding to treatment [FreeTextEntry1] : \par The following procedures have been personally reviewed and interpreted:\par \par EKG - NSR normal segments 1st deg avb\par \par \par

## 2020-05-14 NOTE — HISTORY OF PRESENT ILLNESS
[FreeTextEntry1] : discharged oct 2017\par 53yo M with FHx CAD (father CAD in 50s) and PMHx of HTN, OLIVA (on CPAP), \par non-obstructive CAD by cardiac catheterization 2016, pre-DM, aortic root \par aneurysm with repair 12/2016 presented to [facility] ED (10/12/2017) with unstable \par angina and R/I NSTEMI. Pt was admitted to [facility] for ACS protocol at which time \par ASA/Brilinta load was given and heparin gtt was started. CTA Dissection \par Protocol on admission: No evidence of aortic dissection or aneurysmal \par dilatation. Pt is now s/p cardiac catheterization (10/13/2017) revealing LM \par normal, 30% pLAD stenosis, distal LCx luminal irregularities, mid 100% \par thrombotic RCA lesion with L to R collaterals. LVEF 55%, basal inferior \par hypokinesis, LVEDP 14 mmHG, 24 mm gradient across the aortic valve: mild to \par moderate aortic stenosis. Pt received Thrombectomy/FADI to mRCA. Procedure was \par done via dual injection: Radial/Perclose placement. Echo on 10/14/17 shows EF \par 55%. No valve disease. \par \par 1/31/18 100% complant on DAPT self dc'ed bb due to dizziness and ed, inc exercise toelrance\par \par usoh continue dapt can stop brilinta now, sbp not at goal inc lisinopril to 10mg\par \par off brilinta\par off bb\par \par inc weight 2/2 poor eating habits and work stress\par \par 8/15/19 has CVA engd of april 2019, s/p ILR and bp med adjustments\par ambi bp not at goal mean SBP 140s, take all meds in AM and BP recordings in AM prior to meds\par no cp sob palp\par has ED wants to try cialis, and also has lipoma wants removed\par \par 11/25/19 Interval Symptoms: c/o 10/10 headache, repeat bp taken by me three times at rest mean 124/60, 100% compliant with meds. \par pnd - no\par orthopnea - no\par leg edema - no\par syncope - no\par dizziness - no\par palpitations - no\par leg pain - no\par SOB - no\par chest pain - no\par \par location: chest\par duration: none\par  modifying factors: none\par timing: none\par severity: 0/10\par \par EKG unchanged from prior (in chart)\par consultation notes reviewed where appropriate\par \par Medications: the patient is adherent with his medication regimen. denies medication side effects. \par Disease Management: the patient is doing well with goals. \par \par  12/31/19\par Interval Symptoms: EKG today unchanged from prior (in chart)\par usual state of health, no new complaints\par SOB - no\par chest pain - no\par location: chest\par duration: none\par modifying factors: none\par timing: none\par severity: 0/10\par Medications: the patient is adherent with his medication regimen. denies medication side effects. \par Disease Management: the patient is doing well with goals. \par \par 5/12/20 subjective: no new complaints, neurological status much improved with rehab, no cp sob palp active, losing weight and exercising.\par SOB - no\par chest pain - no\par location: chest\par duration: none\par modifying factors: none\par timing: none\par severity: 0/10

## 2020-06-15 ENCOUNTER — RX RENEWAL (OUTPATIENT)
Age: 55
End: 2020-06-15

## 2020-07-01 ENCOUNTER — APPOINTMENT (OUTPATIENT)
Dept: NEUROLOGY | Facility: CLINIC | Age: 55
End: 2020-07-01

## 2020-07-13 ENCOUNTER — APPOINTMENT (OUTPATIENT)
Dept: HEART AND VASCULAR | Facility: CLINIC | Age: 55
End: 2020-07-13

## 2020-10-01 PROBLEM — K11.7 EXCESSIVE SALIVATION: Status: ACTIVE | Noted: 2019-11-26

## 2020-10-20 ENCOUNTER — APPOINTMENT (OUTPATIENT)
Dept: HEART AND VASCULAR | Facility: CLINIC | Age: 55
End: 2020-10-20
Payer: COMMERCIAL

## 2020-10-20 ENCOUNTER — NON-APPOINTMENT (OUTPATIENT)
Age: 55
End: 2020-10-20

## 2020-10-20 VITALS
BODY MASS INDEX: 32.9 KG/M2 | WEIGHT: 235 LBS | HEART RATE: 76 BPM | TEMPERATURE: 97.5 F | SYSTOLIC BLOOD PRESSURE: 122 MMHG | DIASTOLIC BLOOD PRESSURE: 74 MMHG | HEIGHT: 71 IN | OXYGEN SATURATION: 98 %

## 2020-10-20 DIAGNOSIS — Z95.828 PRESENCE OF OTHER VASCULAR IMPLANTS AND GRAFTS: ICD-10-CM

## 2020-10-20 DIAGNOSIS — I10 ESSENTIAL (PRIMARY) HYPERTENSION: ICD-10-CM

## 2020-10-20 DIAGNOSIS — R51.9 HEADACHE, UNSPECIFIED: ICD-10-CM

## 2020-10-20 DIAGNOSIS — I25.10 ATHEROSCLEROTIC HEART DISEASE OF NATIVE CORONARY ARTERY W/OUT ANGINA PECTORIS: ICD-10-CM

## 2020-10-20 PROCEDURE — 99215 OFFICE O/P EST HI 40 MIN: CPT

## 2020-10-20 PROCEDURE — 93000 ELECTROCARDIOGRAM COMPLETE: CPT

## 2020-10-20 NOTE — HISTORY OF PRESENT ILLNESS
[FreeTextEntry1] : 55M with CAD s/p NSTEMI OCt 2017 100% mRCA s/p FADI, aortic root anuerysm s/p repair Dec 2016, HTN, OLIVA on CPAP, CVA April 2019 s/p tPA with right sided weakness and left sided neuropathy.  \par \par 10/20/20:  doing fantastic with stroke rehab. has an occasional "stumble" where he feels the ground is uneven.  stopped taking lipitor 3 months ago due to side effects including impotence which he said resolved. walks 3-5 miles/day, no cp or sob.  no events on ILR placed at time of stroke.  \par \par Fhx: Father CVA, no hx aneurysms\par \par Meds: losartan 25mg BID, toprol 25mg, aspirin, plavix, lipitor\par \par Non smoker, rare etoh use

## 2020-10-20 NOTE — ASSESSMENT
[FreeTextEntry1] : 55M \par \par NSTEMI s/p PCI RCA - stable, no cp, very active \par CVA s/p tpa, prolonged recovery, gait deficits and right sided weakness much improved \par Aortic Aneurysm s/p repair \par HTN\par EKG: NSR, KALANI\par ECHO: normal LVEF, mod/severe asymmetric septal hypertrophy, no aortic aneurysm\par \par - hypercoag workup,including APLS.  None on record.  Hx of two major arterial events, not a significant amount of athero in coronary and cerebral vascular beds, no afib \par - does not want to take a statin.  Discussed at length benefit in preventing future vascular events.\par - f/u EP Emili re: ILR (no events to date)\par - antithrombotic regimen tbd, pending workup.  Continue DAPT with asa/plavix for now

## 2020-10-20 NOTE — PHYSICAL EXAM
[Normal Appearance] : normal appearance [General Appearance - Well Developed] : well developed [Well Groomed] : well groomed [General Appearance - Well Nourished] : well nourished [No Deformities] : no deformities [General Appearance - In No Acute Distress] : no acute distress [Normal Conjunctiva] : the conjunctiva exhibited no abnormalities [Eyelids - No Xanthelasma] : the eyelids demonstrated no xanthelasmas [Normal Oral Mucosa] : normal oral mucosa [No Oral Pallor] : no oral pallor [No Oral Cyanosis] : no oral cyanosis [Normal Jugular Venous A Waves Present] : normal jugular venous A waves present [Normal Jugular Venous V Waves Present] : normal jugular venous V waves present [No Jugular Venous Macias A Waves] : no jugular venous macias A waves [Heart Sounds] : normal S1 and S2 [Heart Rate And Rhythm] : heart rate and rhythm were normal [Edema] : no peripheral edema present [Murmurs] : no murmurs present [Arterial Pulses Normal] : the arterial pulses were normal [Respiration, Rhythm And Depth] : normal respiratory rhythm and effort [Exaggerated Use Of Accessory Muscles For Inspiration] : no accessory muscle use [Auscultation Breath Sounds / Voice Sounds] : lungs were clear to auscultation bilaterally [Abdomen Soft] : soft [Abdomen Tenderness] : non-tender [Abdomen Mass (___ Cm)] : no abdominal mass palpated [Abnormal Walk] : normal gait [Gait - Sufficient For Exercise Testing] : the gait was sufficient for exercise testing [Cyanosis, Localized] : no localized cyanosis [Nail Clubbing] : no clubbing of the fingernails [Petechial Hemorrhages (___cm)] : no petechial hemorrhages [Skin Color & Pigmentation] : normal skin color and pigmentation [No Venous Stasis] : no venous stasis [] : no rash [Skin Lesions] : no skin lesions [No Skin Ulcers] : no skin ulcer [No Xanthoma] : no  xanthoma was observed

## 2020-10-21 ENCOUNTER — LABORATORY RESULT (OUTPATIENT)
Age: 55
End: 2020-10-21

## 2020-10-21 LAB
25(OH)D3 SERPL-MCNC: 88.4 NG/ML
ALBUMIN SERPL ELPH-MCNC: 4.4 G/DL
ALP BLD-CCNC: 93 U/L
ALT SERPL-CCNC: 32 U/L
ANION GAP SERPL CALC-SCNC: 16 MMOL/L
APTT BLD: 34.7 SEC
AST SERPL-CCNC: 36 U/L
AT III PPP CHRO-ACNC: 118 %
BASOPHILS # BLD AUTO: 0.02 K/UL
BASOPHILS NFR BLD AUTO: 0.3 %
BILIRUB SERPL-MCNC: 0.4 MG/DL
BUN SERPL-MCNC: 26 MG/DL
CALCIUM SERPL-MCNC: 10.3 MG/DL
CARDIOLIPIN AB SER IA-ACNC: NEGATIVE
CHLORIDE SERPL-SCNC: 102 MMOL/L
CHOLEST SERPL-MCNC: 211 MG/DL
CO2 SERPL-SCNC: 24 MMOL/L
CREAT SERPL-MCNC: 1.09 MG/DL
EOSINOPHIL # BLD AUTO: 0.11 K/UL
EOSINOPHIL NFR BLD AUTO: 1.4 %
ESTIMATED AVERAGE GLUCOSE: 114 MG/DL
FACT V ACT/NOR PPP: 97 %
FACT VIII ACT/NOR PPP: 182 %
GLUCOSE SERPL-MCNC: 124 MG/DL
HBA1C MFR BLD HPLC: 5.6 %
HCT VFR BLD CALC: 46.5 %
HCYS SERPL-MCNC: 17.8 UMOL/L
HDLC SERPL-MCNC: 58 MG/DL
HGB BLD-MCNC: 15 G/DL
IMM GRANULOCYTES NFR BLD AUTO: 0.3 %
INR PPP: 1.01 RATIO
LDLC SERPL CALC-MCNC: 115 MG/DL
LYMPHOCYTES # BLD AUTO: 2.04 K/UL
LYMPHOCYTES NFR BLD AUTO: 25.9 %
MAGNESIUM SERPL-MCNC: 2.1 MG/DL
MAN DIFF?: NORMAL
MCHC RBC-ENTMCNC: 30.1 PG
MCHC RBC-ENTMCNC: 32.3 GM/DL
MCV RBC AUTO: 93.4 FL
MONOCYTES # BLD AUTO: 0.66 K/UL
MONOCYTES NFR BLD AUTO: 8.4 %
NEUTROPHILS # BLD AUTO: 5.04 K/UL
NEUTROPHILS NFR BLD AUTO: 63.7 %
NONHDLC SERPL-MCNC: 153 MG/DL
NT-PROBNP SERPL-MCNC: 65 PG/ML
PLATELET # BLD AUTO: 224 K/UL
POTASSIUM SERPL-SCNC: 4.4 MMOL/L
PROT C PPP CHRO-ACNC: 125 %
PROT S AG ACT/NOR PPP IA: 118 %
PROT SERPL-MCNC: 6.8 G/DL
PT BLD: 11.9 SEC
RBC # BLD: 4.98 M/UL
RBC # FLD: 12.9 %
SILICA CLOTTING TIME INTERPRETATION: NORMAL
SILICA CLOTTING TIME S/C: 0.91 RATIO
SODIUM SERPL-SCNC: 143 MMOL/L
TRIGL SERPL-MCNC: 186 MG/DL
TSH SERPL-ACNC: 1.66 UIU/ML
WBC # FLD AUTO: 7.89 K/UL

## 2020-10-23 LAB
B2 GLYCOPROT1 IGA SERPL IA-ACNC: <5 SAU
B2 GLYCOPROT1 IGG SER-ACNC: <5 SGU
B2 GLYCOPROT1 IGM SER-ACNC: <5 SMU
CARDIOLIPIN IGM SER-MCNC: 5.2 MPL
CARDIOLIPIN IGM SER-MCNC: <5 GPL
DEPRECATED CARDIOLIPIN IGA SER: <5 APL

## 2020-10-27 PROBLEM — R51.9 FREQUENT HEADACHES: Status: ACTIVE | Noted: 2019-11-29

## 2020-10-27 PROBLEM — I25.10 CAD IN NATIVE ARTERY: Status: ACTIVE | Noted: 2018-01-31

## 2020-10-27 PROBLEM — Z95.828 S/P ASCENDING AORTIC REPLACEMENT: Status: ACTIVE | Noted: 2017-01-03

## 2020-10-27 RX ORDER — CLOPIDOGREL BISULFATE 75 MG/1
75 TABLET, FILM COATED ORAL
Qty: 90 | Refills: 2 | Status: DISCONTINUED | COMMUNITY
Start: 2019-06-27 | End: 2020-10-27

## 2020-11-02 ENCOUNTER — APPOINTMENT (OUTPATIENT)
Dept: NEUROLOGY | Facility: CLINIC | Age: 55
End: 2020-11-02
Payer: COMMERCIAL

## 2020-11-02 VITALS
BODY MASS INDEX: 32.9 KG/M2 | OXYGEN SATURATION: 97 % | TEMPERATURE: 96.3 F | DIASTOLIC BLOOD PRESSURE: 79 MMHG | WEIGHT: 235 LBS | HEIGHT: 71 IN | HEART RATE: 62 BPM | SYSTOLIC BLOOD PRESSURE: 150 MMHG

## 2020-11-02 DIAGNOSIS — R20.9 OTHER SEQUELAE OF CEREBRAL INFARCTION: ICD-10-CM

## 2020-11-02 DIAGNOSIS — I69.393 ATAXIA FOLLOWING CEREBRAL INFARCTION: ICD-10-CM

## 2020-11-02 DIAGNOSIS — I63.111: ICD-10-CM

## 2020-11-02 DIAGNOSIS — I69.398 OTHER SEQUELAE OF CEREBRAL INFARCTION: ICD-10-CM

## 2020-11-02 PROCEDURE — 99072 ADDL SUPL MATRL&STAF TM PHE: CPT

## 2020-11-02 PROCEDURE — 99214 OFFICE O/P EST MOD 30 MIN: CPT

## 2020-11-02 RX ORDER — ATORVASTATIN CALCIUM 40 MG/1
40 TABLET, FILM COATED ORAL
Qty: 90 | Refills: 2 | Status: DISCONTINUED | COMMUNITY
Start: 2019-11-26 | End: 2020-11-02

## 2020-11-03 ENCOUNTER — TRANSCRIPTION ENCOUNTER (OUTPATIENT)
Age: 55
End: 2020-11-03

## 2020-11-05 ENCOUNTER — APPOINTMENT (OUTPATIENT)
Dept: HEART AND VASCULAR | Facility: CLINIC | Age: 55
End: 2020-11-05

## 2020-11-30 ENCOUNTER — APPOINTMENT (OUTPATIENT)
Dept: HEART AND VASCULAR | Facility: CLINIC | Age: 55
End: 2020-11-30
Payer: COMMERCIAL

## 2020-11-30 VITALS
WEIGHT: 230 LBS | TEMPERATURE: 98.9 F | HEART RATE: 75 BPM | SYSTOLIC BLOOD PRESSURE: 139 MMHG | BODY MASS INDEX: 43.43 KG/M2 | HEIGHT: 61 IN | DIASTOLIC BLOOD PRESSURE: 80 MMHG

## 2020-11-30 PROCEDURE — 93285 PRGRMG DEV EVAL SCRMS IP: CPT

## 2020-11-30 PROCEDURE — 99072 ADDL SUPL MATRL&STAF TM PHE: CPT

## 2020-12-01 NOTE — PROCEDURE
[de-identified] : MDT REVEAL LINQ\par Adequate sensing 0.52 mV\par Battery OK\par 2 AF events - EGM c/w SR \par No other auto or symptom events

## 2020-12-01 NOTE — DISCUSSION/SUMMARY
[FreeTextEntry1] : Mr. Sheppard is a 55 year-old gentleman with a history of cryptogenic stroke s/p ILR placement to monitor for occult atrial fibrillation.  No arrhythmia events have been detected since device implant.  His incision is well healed. He is enrolled in remote monitoring.  He device is functioning appropriately and I encouraged him to keep the device in place for long-term heart rhythm monitoring.  He agrees with this plan.  Advised to follow-up in six months or sooner as needed.  All questions were answered.

## 2020-12-01 NOTE — HISTORY OF PRESENT ILLNESS
[de-identified] : Mr. 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  \par \par He initially presented  to [facility] in [place], [place] on 5/1/19 with acute onset dizziness, gait ataxia (falling to R) and found to have subacute/acute left frontal and right cerebellar strokes, right distal vertebral artery occlusion of unclear chronicity.  Treated with tPA.  Course complicated by dysphagia; s/p PEG tube placement.  He has had significant improvement since we last saw him.  He is requesting ILR explant because he feels "great".

## 2020-12-03 ENCOUNTER — APPOINTMENT (OUTPATIENT)
Dept: NEUROLOGY | Facility: CLINIC | Age: 55
End: 2020-12-03
Payer: COMMERCIAL

## 2020-12-03 PROCEDURE — 99072 ADDL SUPL MATRL&STAF TM PHE: CPT

## 2020-12-03 PROCEDURE — 93880 EXTRACRANIAL BILAT STUDY: CPT

## 2020-12-08 ENCOUNTER — NON-APPOINTMENT (OUTPATIENT)
Age: 55
End: 2020-12-08

## 2021-02-22 NOTE — ED ADULT NURSE NOTE - PLAN OF CARE DISCUSSED WITH:
Patient c/o chronic lower back pain and bilateral foot pain, states she thinks she hasn't cut her toenails and they hurt over time. ambulate steady gait.

## 2021-04-06 ENCOUNTER — APPOINTMENT (OUTPATIENT)
Dept: HEART AND VASCULAR | Facility: CLINIC | Age: 56
End: 2021-04-06
Payer: COMMERCIAL

## 2021-04-06 ENCOUNTER — NON-APPOINTMENT (OUTPATIENT)
Age: 56
End: 2021-04-06

## 2021-04-06 PROCEDURE — G2066: CPT

## 2021-04-06 PROCEDURE — 93298 REM INTERROG DEV EVAL SCRMS: CPT

## 2021-06-28 ENCOUNTER — APPOINTMENT (OUTPATIENT)
Dept: HEART AND VASCULAR | Facility: CLINIC | Age: 56
End: 2021-06-28
Payer: COMMERCIAL

## 2021-06-28 ENCOUNTER — NON-APPOINTMENT (OUTPATIENT)
Age: 56
End: 2021-06-28

## 2021-06-28 VITALS
WEIGHT: 262 LBS | DIASTOLIC BLOOD PRESSURE: 64 MMHG | SYSTOLIC BLOOD PRESSURE: 136 MMHG | HEIGHT: 61 IN | HEART RATE: 68 BPM | TEMPERATURE: 95.9 F | OXYGEN SATURATION: 97 % | BODY MASS INDEX: 49.47 KG/M2

## 2021-06-28 PROCEDURE — 93298 REM INTERROG DEV EVAL SCRMS: CPT

## 2021-06-28 PROCEDURE — 93285 PRGRMG DEV EVAL SCRMS IP: CPT

## 2021-06-28 PROCEDURE — G2066: CPT

## 2021-06-28 RX ORDER — RIVAROXABAN 2.5 MG/1
2.5 TABLET, FILM COATED ORAL
Qty: 60 | Refills: 5 | Status: DISCONTINUED | COMMUNITY
Start: 2020-10-27 | End: 2021-06-28

## 2021-06-28 NOTE — DISCUSSION/SUMMARY
[FreeTextEntry1] : Mr. Sheppard is a 56 year-old gentleman with a history of cryptogenic stroke s/p ILR placement to monitor for occult atrial fibrillation.  No arrhythmia events have been detected since device implant.  His incision is well healed. He is enrolled in remote monitoring.  He device is functioning appropriately and I encouraged him to keep the device in place for long-term heart rhythm monitoring.  He agrees with this plan.  Advised to follow-up in six months or sooner as needed.  All questions were answered.

## 2021-06-28 NOTE — HISTORY OF PRESENT ILLNESS
[de-identified] : Mr. 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  \par \par He initially presented  to [facility] in [place], [place] on 5/1/19 with acute onset dizziness, gait ataxia (falling to R) and found to have subacute/acute left frontal and right cerebellar strokes, right distal vertebral artery occlusion of unclear chronicity.  Treated with tPA.  Course complicated by dysphagia; s/p PEG tube placement.  He has had significant improvement.  He is again requesting ILR explant because he feels fine and has had no arrhythmias.

## 2021-06-28 NOTE — PROCEDURE
[de-identified] : MDT REVEAL LINQ\par Adequate sensing 0.46 mV\par Battery OK\par No auto or symptom events

## 2021-08-03 ENCOUNTER — NON-APPOINTMENT (OUTPATIENT)
Age: 56
End: 2021-08-03

## 2021-08-03 ENCOUNTER — APPOINTMENT (OUTPATIENT)
Dept: HEART AND VASCULAR | Facility: CLINIC | Age: 56
End: 2021-08-03
Payer: COMMERCIAL

## 2021-08-03 PROCEDURE — 93298 REM INTERROG DEV EVAL SCRMS: CPT

## 2021-08-03 PROCEDURE — G2066: CPT

## 2021-09-07 ENCOUNTER — APPOINTMENT (OUTPATIENT)
Dept: HEART AND VASCULAR | Facility: CLINIC | Age: 56
End: 2021-09-07
Payer: COMMERCIAL

## 2021-09-07 ENCOUNTER — NON-APPOINTMENT (OUTPATIENT)
Age: 56
End: 2021-09-07

## 2021-09-07 PROCEDURE — G2066: CPT

## 2021-09-07 PROCEDURE — 93298 REM INTERROG DEV EVAL SCRMS: CPT

## 2021-10-06 PROBLEM — I10 ESSENTIAL HYPERTENSION: Status: ACTIVE | Noted: 2019-06-27

## 2021-10-12 ENCOUNTER — NON-APPOINTMENT (OUTPATIENT)
Age: 56
End: 2021-10-12

## 2021-10-12 ENCOUNTER — APPOINTMENT (OUTPATIENT)
Dept: HEART AND VASCULAR | Facility: CLINIC | Age: 56
End: 2021-10-12
Payer: COMMERCIAL

## 2021-10-12 PROCEDURE — G2066: CPT

## 2021-10-12 PROCEDURE — 93298 REM INTERROG DEV EVAL SCRMS: CPT

## 2021-11-16 ENCOUNTER — APPOINTMENT (OUTPATIENT)
Dept: HEART AND VASCULAR | Facility: CLINIC | Age: 56
End: 2021-11-16
Payer: COMMERCIAL

## 2021-11-16 ENCOUNTER — NON-APPOINTMENT (OUTPATIENT)
Age: 56
End: 2021-11-16

## 2021-11-16 PROCEDURE — 93298 REM INTERROG DEV EVAL SCRMS: CPT

## 2021-11-16 PROCEDURE — G2066: CPT

## 2021-12-14 NOTE — PHYSICAL EXAM
[facility]          2021        Earnestine Lou       : 2015  [address] [place] [facility] [address]        To Whom It May Concern,    This is to certify that Earnestine Lou was seen in the clinic on  2021.    Isolate until 21 if covid positive otherwise off isolation on 21        SIGNATURE:_________________________________________, 2021       CADY Campbell                                .      [facility]   [address]  [place] [address]  [phone]   [Midline] : trachea located in midline position [Normal] : no rashes [de-identified] : some secretions on the left [FreeTextEntry1] : adequate range, pitch and projection.  slight issues with articulation.  no garbled speech. no wet speech.  Able to enunciate and articulate L, P, K, sounds when prompted.

## 2022-01-03 ENCOUNTER — APPOINTMENT (OUTPATIENT)
Dept: HEART AND VASCULAR | Facility: CLINIC | Age: 57
End: 2022-01-03
Payer: COMMERCIAL

## 2022-01-03 VITALS
HEART RATE: 53 BPM | DIASTOLIC BLOOD PRESSURE: 97 MMHG | SYSTOLIC BLOOD PRESSURE: 170 MMHG | BODY MASS INDEX: 47.2 KG/M2 | TEMPERATURE: 97.7 F | WEIGHT: 250 LBS | HEIGHT: 61 IN

## 2022-01-03 VITALS — DIASTOLIC BLOOD PRESSURE: 72 MMHG | SYSTOLIC BLOOD PRESSURE: 122 MMHG

## 2022-01-03 PROCEDURE — 93285 PRGRMG DEV EVAL SCRMS IP: CPT

## 2022-01-03 RX ORDER — METOPROLOL SUCCINATE 25 MG/1
25 TABLET, EXTENDED RELEASE ORAL
Qty: 90 | Refills: 3 | Status: DISCONTINUED | COMMUNITY
Start: 2019-07-22 | End: 2022-01-03

## 2022-01-03 RX ORDER — LOSARTAN POTASSIUM 50 MG/1
50 TABLET, FILM COATED ORAL
Qty: 90 | Refills: 3 | Status: DISCONTINUED | COMMUNITY
Start: 2019-06-27 | End: 2022-01-03

## 2022-01-03 RX ORDER — FLUTICASONE PROPIONATE 50 UG/1
50 SPRAY, METERED NASAL DAILY
Qty: 3 | Refills: 6 | Status: DISCONTINUED | COMMUNITY
Start: 2020-01-17 | End: 2022-01-03

## 2022-01-03 NOTE — DISCUSSION/SUMMARY
[FreeTextEntry1] : Mr. Sheppard is a 56 year-old gentleman with a history of cryptogenic stroke s/p ILR placement to monitor for occult atrial fibrillation.  No arrhythmia events have been detected since device implant.  His incision is well healed. He is enrolled in remote monitoring.  He device is functioning appropriately and I encouraged him to keep the device in place for long-term heart rhythm monitoring.  He agrees with this plan.  Advised to follow-up in six months or sooner as needed.  I also advised him to follow-up with Dr. Street for general cardiology care.

## 2022-01-03 NOTE — HISTORY OF PRESENT ILLNESS
[de-identified] : Mr. 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  \par \par He initially presented  to [facility] in [place], [place] on 5/1/19 with acute onset dizziness, gait ataxia (falling to R) and found to have subacute/acute left frontal and right cerebellar strokes, right distal vertebral artery occlusion of unclear chronicity.  Treated with tPA.  Course complicated by dysphagia; s/p PEG tube placement.  He has had significant improvement.  He is complaining of residual neuropathic pain and requesting a prescription for medical marijuana.

## 2022-01-03 NOTE — PROCEDURE
[de-identified] : MDT REVEAL LINQ\par Adequate sensing 0.34 mV\par Battery OK\par AF events - EGMs c/w SR with PACs

## 2022-02-07 ENCOUNTER — NON-APPOINTMENT (OUTPATIENT)
Age: 57
End: 2022-02-07

## 2022-02-07 ENCOUNTER — APPOINTMENT (OUTPATIENT)
Dept: HEART AND VASCULAR | Facility: CLINIC | Age: 57
End: 2022-02-07
Payer: COMMERCIAL

## 2022-02-07 PROCEDURE — 93298 REM INTERROG DEV EVAL SCRMS: CPT

## 2022-02-07 PROCEDURE — G2066: CPT

## 2022-03-14 ENCOUNTER — NON-APPOINTMENT (OUTPATIENT)
Age: 57
End: 2022-03-14

## 2022-03-14 ENCOUNTER — APPOINTMENT (OUTPATIENT)
Dept: HEART AND VASCULAR | Facility: CLINIC | Age: 57
End: 2022-03-14
Payer: COMMERCIAL

## 2022-03-14 PROCEDURE — 93298 REM INTERROG DEV EVAL SCRMS: CPT

## 2022-03-14 PROCEDURE — G2066: CPT

## 2022-04-18 ENCOUNTER — NON-APPOINTMENT (OUTPATIENT)
Age: 57
End: 2022-04-18

## 2022-04-18 ENCOUNTER — APPOINTMENT (OUTPATIENT)
Dept: HEART AND VASCULAR | Facility: CLINIC | Age: 57
End: 2022-04-18
Payer: COMMERCIAL

## 2022-04-18 PROCEDURE — 93298 REM INTERROG DEV EVAL SCRMS: CPT

## 2022-04-18 PROCEDURE — G2066: CPT

## 2022-05-24 ENCOUNTER — APPOINTMENT (OUTPATIENT)
Dept: HEART AND VASCULAR | Facility: CLINIC | Age: 57
End: 2022-05-24
Payer: COMMERCIAL

## 2022-05-24 ENCOUNTER — NON-APPOINTMENT (OUTPATIENT)
Age: 57
End: 2022-05-24

## 2022-05-24 PROCEDURE — 93298 REM INTERROG DEV EVAL SCRMS: CPT

## 2022-05-24 PROCEDURE — G2066: CPT

## 2022-06-27 ENCOUNTER — NON-APPOINTMENT (OUTPATIENT)
Age: 57
End: 2022-06-27

## 2022-06-27 ENCOUNTER — APPOINTMENT (OUTPATIENT)
Dept: HEART AND VASCULAR | Facility: CLINIC | Age: 57
End: 2022-06-27

## 2022-06-27 PROCEDURE — 93298 REM INTERROG DEV EVAL SCRMS: CPT

## 2022-06-27 PROCEDURE — G2066: CPT

## 2022-07-11 ENCOUNTER — APPOINTMENT (OUTPATIENT)
Dept: HEART AND VASCULAR | Facility: CLINIC | Age: 57
End: 2022-07-11

## 2022-07-11 VITALS
DIASTOLIC BLOOD PRESSURE: 76 MMHG | BODY MASS INDEX: 32.2 KG/M2 | TEMPERATURE: 97.4 F | HEIGHT: 71 IN | WEIGHT: 230 LBS | HEART RATE: 55 BPM | SYSTOLIC BLOOD PRESSURE: 146 MMHG

## 2022-07-11 PROCEDURE — 93285 PRGRMG DEV EVAL SCRMS IP: CPT

## 2022-07-11 NOTE — HISTORY OF PRESENT ILLNESS
[de-identified] : Mr. 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  \par \par He initially presented  to [facility] in [place], [place] on 5/1/19 with acute onset dizziness, gait ataxia (falling to R) and found to have subacute/acute left frontal and right cerebellar strokes, right distal vertebral artery occlusion of unclear chronicity.  Treated with tPA.  Course complicated by dysphagia; s/p PEG tube placement.  He has had significant improvement.  He notes no interval changes in his health.

## 2022-07-11 NOTE — PROCEDURE
[de-identified] : MDT REVEAL LINQ\par Presenting rhythm c/w SR\par Adequate sensing 0.34 mV\par Battery OK\par No new auto or symptom events

## 2022-07-11 NOTE — DISCUSSION/SUMMARY
[FreeTextEntry1] : Mr. Sheppard is a 57 year-old gentleman with a history of cryptogenic stroke s/p ILR placement to monitor for occult atrial fibrillation.  No arrhythmia events have been detected since device implant.  His incision is well healed. He is enrolled in remote monitoring.  He device is functioning appropriately and I encouraged him to keep the device in place for long-term heart rhythm monitoring.  He agrees with this plan.  Advised to follow-up in six months or sooner as needed.  I also advised him to follow-up with Dr. Esparza for general cardiology care.

## 2022-08-01 ENCOUNTER — APPOINTMENT (OUTPATIENT)
Dept: HEART AND VASCULAR | Facility: CLINIC | Age: 57
End: 2022-08-01

## 2022-08-01 ENCOUNTER — NON-APPOINTMENT (OUTPATIENT)
Age: 57
End: 2022-08-01

## 2022-08-01 PROCEDURE — G2066: CPT

## 2022-08-01 PROCEDURE — 93298 REM INTERROG DEV EVAL SCRMS: CPT

## 2022-09-06 ENCOUNTER — APPOINTMENT (OUTPATIENT)
Dept: HEART AND VASCULAR | Facility: CLINIC | Age: 57
End: 2022-09-06

## 2022-09-06 ENCOUNTER — NON-APPOINTMENT (OUTPATIENT)
Age: 57
End: 2022-09-06

## 2022-09-06 PROCEDURE — G2066: CPT

## 2022-09-06 PROCEDURE — 93298 REM INTERROG DEV EVAL SCRMS: CPT

## 2022-09-14 NOTE — H&P ADULT - NSHPLABSRESULTS_GEN_ALL_CORE
What Type Of Note Output Would You Prefer (Optional)?: Bullet Format
Hpi Title: Evaluation of Skin Lesions
Additional History: Pt states eczema left ankle. Has used triamcinalone and mometasone before, states they are not effective anymore. \\nPt notes skin tags on neck and near bra line that are irritated. \\nPt notes she has questions about brown discoloration on face.\\nNo personal or family history of skin cancer. \\n\\n
15.2   12.6  )-----------( 178      ( 12 Oct 2017 21:12 )             44.7       10-12    135  |  96  |  19  ----------------------------<  120<H>  4.0   |  25  |  1.10    Ca    9.4      12 Oct 2017 21:12    TPro  7.5  /  Alb  4.2  /  TBili  0.4  /  DBili  x   /  AST  114<H>  /  ALT  43  /  AlkPhos  89  10-12      PT/INR - ( 12 Oct 2017 21:12 )   PT: 12.0 sec;   INR: 1.08          PTT - ( 12 Oct 2017 21:12 )  PTT:30.5 sec    CARDIAC MARKERS ( 12 Oct 2017 21:12 )  x     / 0.53 ng/mL / 902 U/L / x     / 60.2 ng/mL

## 2022-10-11 ENCOUNTER — APPOINTMENT (OUTPATIENT)
Dept: HEART AND VASCULAR | Facility: CLINIC | Age: 57
End: 2022-10-11
Payer: COMMERCIAL

## 2022-10-11 ENCOUNTER — NON-APPOINTMENT (OUTPATIENT)
Age: 57
End: 2022-10-11

## 2022-10-11 PROCEDURE — G2066: CPT

## 2022-10-11 PROCEDURE — 93298 REM INTERROG DEV EVAL SCRMS: CPT

## 2022-10-29 NOTE — REASON FOR VISIT
[Initial Evaluation] : an initial evaluation of [Coronary Artery Disease] : coronary artery disease No

## 2022-11-15 ENCOUNTER — NON-APPOINTMENT (OUTPATIENT)
Age: 57
End: 2022-11-15

## 2022-11-15 ENCOUNTER — APPOINTMENT (OUTPATIENT)
Dept: HEART AND VASCULAR | Facility: CLINIC | Age: 57
End: 2022-11-15
Payer: COMMERCIAL

## 2022-11-15 PROCEDURE — 93298 REM INTERROG DEV EVAL SCRMS: CPT

## 2022-11-15 PROCEDURE — G2066: CPT

## 2022-12-20 ENCOUNTER — APPOINTMENT (OUTPATIENT)
Dept: HEART AND VASCULAR | Facility: CLINIC | Age: 57
End: 2022-12-20
Payer: COMMERCIAL

## 2022-12-20 ENCOUNTER — NON-APPOINTMENT (OUTPATIENT)
Age: 57
End: 2022-12-20

## 2022-12-20 PROCEDURE — 93298 REM INTERROG DEV EVAL SCRMS: CPT

## 2022-12-20 PROCEDURE — G2066: CPT

## 2023-01-09 ENCOUNTER — APPOINTMENT (OUTPATIENT)
Dept: HEART AND VASCULAR | Facility: CLINIC | Age: 58
End: 2023-01-09
Payer: COMMERCIAL

## 2023-01-09 VITALS
HEIGHT: 71 IN | DIASTOLIC BLOOD PRESSURE: 66 MMHG | TEMPERATURE: 97.2 F | BODY MASS INDEX: 32.9 KG/M2 | SYSTOLIC BLOOD PRESSURE: 122 MMHG | WEIGHT: 235 LBS | HEART RATE: 69 BPM

## 2023-01-09 PROCEDURE — 99212 OFFICE O/P EST SF 10 MIN: CPT | Mod: 25

## 2023-01-09 PROCEDURE — 93285 PRGRMG DEV EVAL SCRMS IP: CPT

## 2023-01-09 NOTE — HISTORY OF PRESENT ILLNESS
[de-identified] : Mr. 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  \par \par He initially presented  to [facility] in [place], [place] on 5/1/19 with acute onset dizziness, gait ataxia (falling to R) and found to have subacute/acute left frontal and right cerebellar strokes, right distal vertebral artery occlusion of unclear chronicity.  Treated with tPA.  Course complicated by dysphagia; s/p PEG tube placement.  He has had significant improvement.  He notes no interval changes in his health.  Works in [employer] industry and business has been good.

## 2023-01-09 NOTE — DISCUSSION/SUMMARY
[FreeTextEntry1] : Mr. Sheppard is a 57 year-old gentleman with a history of cryptogenic stroke s/p ILR placement to monitor for occult atrial fibrillation.  No arrhythmia events have been detected since device implant.  The device is at RRT and he will be scheduled for elective explant.  Pre and post procedure instructions were reviewed with him and all questions were answered to his apparent satisfaction.

## 2023-01-09 NOTE — ADDENDUM
[FreeTextEntry1] : I, Leyla Mock, hereby attest that the medical record entry for this patient accurately reflects signatures/notations that I made on the Date of Service in my capacity as an Attending Physician when I treated/diagnosed the above patient. I do hereby attest that this information is true, accurate and complete to the best of my knowledge.  I was present for the entire visit and supervised the entire visit and made/agree with the plan as outlined.\par

## 2023-01-09 NOTE — PROCEDURE
[de-identified] : MDT REVEAL LINQ\par Presenting rhythm c/w SR\par Adequate sensing 0.31 mV\par Battery OK\par 1 AF event - EGM c/w SR with PVCs

## 2023-01-30 LAB — SARS-COV-2 N GENE NPH QL NAA+PROBE: NOT DETECTED

## 2023-01-31 ENCOUNTER — OUTPATIENT (OUTPATIENT)
Dept: OUTPATIENT SERVICES | Facility: HOSPITAL | Age: 58
LOS: 1 days | Discharge: HOME | End: 2023-01-31
Payer: COMMERCIAL

## 2023-01-31 DIAGNOSIS — Z98.890 OTHER SPECIFIED POSTPROCEDURAL STATES: Chronic | ICD-10-CM

## 2023-01-31 DIAGNOSIS — N62 HYPERTROPHY OF BREAST: Chronic | ICD-10-CM

## 2023-01-31 PROCEDURE — 33286 RMVL SUBQ CAR RHYTHM MNTR: CPT

## 2023-02-13 ENCOUNTER — NON-APPOINTMENT (OUTPATIENT)
Age: 58
End: 2023-02-13

## 2023-02-13 ENCOUNTER — APPOINTMENT (OUTPATIENT)
Dept: HEART AND VASCULAR | Facility: CLINIC | Age: 58
End: 2023-02-13
Payer: COMMERCIAL

## 2023-02-13 PROCEDURE — 93298 REM INTERROG DEV EVAL SCRMS: CPT

## 2023-02-13 PROCEDURE — G2066: CPT

## 2023-03-20 ENCOUNTER — APPOINTMENT (OUTPATIENT)
Dept: HEART AND VASCULAR | Facility: CLINIC | Age: 58
End: 2023-03-20

## 2023-05-30 NOTE — PROGRESS NOTE ADULT - PROBLEM/PLAN-9
Use the cream as directed    Take the prednisone with food in the morning
DISPLAY PLAN FREE TEXT

## 2023-08-18 NOTE — PHYSICAL EXAM
POA platelets 234, pressure review in September 2022 was noted unremarkable  Iron panel: Iron Sat: 14, TIBC: 182, iron: 26  plt  improving      Plan  Likely in the setting of sepsis   Follow up PCP [FreeTextEntry1] : General: sitting in exam chair, NAD\par Eyes: anicteric sclera\par CV: RRR\par Extremities: 2+ radial pulses bilaterally\par \par Neurologic:\par - Mental Status:  AAOx3; speech is fluent with intact naming, repetition, and comprehension, attention intact, fund of knowledge appropriate\par - Cranial Nerves II-XII:  \par II:  PERRLA; visual fields are full to confrontation\par III, IV, VI:  EOMI, no nystagmus \par V:  facial sensation is intact in the V1-V3 distribution bilaterally.\par VII:  face is symmetric\par IX, X:  uvula is midline and soft palate rises symmetrically\par XI:  head turning and shoulder shrug are intact bilaterally\par XII:  tongue protrudes in the midline\par - Motor:  right pronator drift, strength is 5/5 throughout, though pain restriction at right shoulder; normal muscle bulk throughout, increased tone at knees\par - Reflexes:  2+ and symmetric in general; plantar reflexes equivocal bilaterally\par - Sensory:  intact to light touch throughout; UNABLE to distinguish between sharp and dull on left upper and lower extremity\par - Coordination: mild dysmetria on right finger-nose-finger, heel-knee-shin intact without dysmetria\par - Gait: steady but relatively stiff without flexing either knee fully

## 2023-08-25 NOTE — PROVIDER CONTACT NOTE (CRITICAL VALUE NOTIFICATION) - NS PROVIDER READ BACK TO LAB
Spoke with pt's father, knee is doing better. Pt has been wrapping it and went to school today. They have a pcp appointment on sept 5th. yes

## 2024-03-27 NOTE — ED ADULT TRIAGE NOTE - CHIEF COMPLAINT QUOTE
pt has a history of open heart surgery for aneurysm  , pt c/o left sided chest pain since this morning , which radiates to his back , pt denies any SOB , pt also c/o facial pain patient

## 2024-05-02 NOTE — PROGRESS NOTE ADULT - SUBJECTIVE AND OBJECTIVE BOX
Chief Complaint/Reason for Consult: CAD  INTERVAL HPI: no eventso n tele, d/w HS - failed dysphagia screen, considering ILR and LISSY  	  MEDICATIONS:  metoprolol tartrate 25 milliGRAM(s) Oral two times a day    piperacillin/tazobactam IVPB. 4.5 Gram(s) IV Intermittent every 6 hours  piperacillin/tazobactam IVPB.          baclofen 20 milliGRAM(s) Oral three times a day  gabapentin   Solution 300 milliGRAM(s) Oral two times a day      atorvastatin 80 milliGRAM(s) Oral at bedtime    oxymetazoline 0.05% Nasal Spray 1 Spray(s) Both Nostrils two times a day  sodium chloride 0.65% Nasal 1 Spray(s) Both Nostrils four times a day PRN      REVIEW OF SYSTEMS:  [x] As per HPI  CONSTITUTIONAL: No fever, weight loss, or fatigue  RESPIRATORY: No cough, wheezing, chills or hemoptysis; No Shortness of Breath  CARDIOVASCULAR: No chest pain, palpitations, dizziness, or leg swelling  GASTROINTESTINAL: No abdominal or epigastric pain. No nausea, vomiting, or hematemesis; No diarrhea or constipation. No melena or hematochezia.  MUSCULOSKELETAL: No joint pain or swelling; No muscle, back, or extremity pain  [x] All others negative	  [ ] Unable to obtain    PHYSICAL EXAM:  T(C): 36.3 (05-10-19 @ 13:21), Max: 37.2 (05-10-19 @ 01:16)  HR: 64 (05-10-19 @ 13:57) (61 - 70)  BP: 172/80 (05-10-19 @ 13:57) (121/61 - 177/82)  RR: 18 (05-10-19 @ 13:57) (16 - 26)  SpO2: 93% (05-10-19 @ 13:57) (90% - 96%)  Wt(kg): --  I&O's Summary    09 May 2019 07:01  -  10 May 2019 07:00  --------------------------------------------------------  IN: 940 mL / OUT: 1650 mL / NET: -710 mL          Appearance: Normal	  HEENT:   Normal oral mucosa  Cardiovascular: Normal S1 S2, No JVD, No murmurs, No edema  Respiratory: Lungs clear to auscultation	  Gastrointestinal:  Soft, Non-tender, + BS	  Extremities: Normal range of motion, No clubbing, cyanosis or edema  Vascular: Peripheral pulses palpable 2+ bilaterally    TELEMETRY: 	    ECG:   	  RADIOLOGY:   CXR:  CT:  US:    CARDIAC TESTING:  Echocardiogram:  Catheterization:  Stress Test:      LABS:	 	    CARDIAC MARKERS:                                  14.5   10.85 )-----------( 211      ( 10 May 2019 06:32 )             44.2     05-10    141  |  101  |  21  ----------------------------<  156<H>  4.4   |  31  |  0.92    Ca    9.6      10 May 2019 06:32  Mg     2.4     05-10      proBNP:   Lipid Profile:   HgA1c:   TSH:     ASSESSMENT/PLAN: 	    # CVA - multiple territories, must consider cardiac etiology.  recommend continue telemtry monitoring  call [phone] for ILR placement  LISSY to r/o intracardiac KALANI thrombus  Anticoagulation per neruology    Thoracic aortic aneurysm without rupture.  Plan: Pt with hx of thoracic aortic aneurysm s/p repair in 2016 by Dr Stevenson  status post valve sparing aortic root replacement, ascending and hemiarch replacement on 12/19/16.   Echo 3/2019: concentric LVH, EF 60%, akinesis of basal inferior wall, no significant valvulopathies       Coronary artery disease involving native coronary artery of native heart without angina pectoris.  Plan: Pt with hx of CAD with NSTEMI, s/p stent to RCA in Oct 2017 s/p DAPT x 1 year.  - Per chart review, cardiac cath during admission for NSTEMI: LM normal, 30% pLAD stenosis, distal LCx luminal irregularities, mid 100% thrombotic RCA lesion with L to R collaterals. LVEF 55%, basal inferior hypokinesis, LVEDP 14 mmHG, 24 mm gradient across the aortic valve: mild to moderate aortic stenosis  - C/w Asa 81 mg daily  - C/w lisinopril 2.5 daily, uptitrate as tolerated  - C/w atorvastatin 80mg qHS.    Hypertension, unspecified type. Plan: - C/w home medications: lisinopril 2.5mg daily, toprol xl 50mg daily. Behavior normal.         Thought Content: Thought content normal.         Judgment: Judgment normal.         Assessment / Plan:   Calli was seen today for follow-up.    Diagnoses and all orders for this visit:    IFG (impaired fasting glucose)  -     empagliflozin (JARDIANCE) 25 MG tablet; Take 1 tablet by mouth daily    Fibromyalgia  -     topiramate (TOPAMAX) 50 MG tablet; Take 2 tablets by mouth 2 times daily  -     Comprehensive Metabolic Panel; Future  -     Comprehensive Metabolic Panel; Future    Migraine without aura and without status migrainosus, not intractable  -     topiramate (TOPAMAX) 50 MG tablet; Take 2 tablets by mouth 2 times daily  -     Comprehensive Metabolic Panel; Future  -     Comprehensive Metabolic Panel; Future    Seasonal allergic rhinitis due to pollen  -     Comprehensive Metabolic Panel; Future  -     fluticasone (FLONASE) 50 MCG/ACT nasal spray; 2 sprays by Each Nostril route daily  -     Comprehensive Metabolic Panel; Future    Primary osteoarthritis of both hands  -     Comprehensive Metabolic Panel; Future  -     Comprehensive Metabolic Panel; Future    Primary osteoarthritis of both knees  -     Comprehensive Metabolic Panel; Future  -     Comprehensive Metabolic Panel; Future    Renal insufficiency  -     Comprehensive Metabolic Panel; Future  -     empagliflozin (JARDIANCE) 25 MG tablet; Take 1 tablet by mouth daily    Will follow with own gynecologist for gynecological and breast care.       Willfollow with own gynecologist for gynecological and breast care.    Reviewed health maintenance report. Patient is aware of deficiencies and suggested preventative tests.

## 2024-10-02 NOTE — DIETITIAN INITIAL EVALUATION ADULT. - PERTINENT LABORATORY DATA
Physical Therapy    Visit Type: initial evaluation  SUBJECTIVE  Patient agreed to participate in therapy this date.  Patient verbally agrees to allow the following to be present during session: spouse  \"I think I fell out of my bed.\"  \"\"My  has been helping me for years.\"  \"I have macular degeneration.\"     \"I'm very tired now.\"   Patient / Family Goal: return to previous functional status, maximize function and return home    Pain   Patient reports pain is not an issue/concern.     OBJECTIVE     Cognitive Status   Orientation    - Oriented to: person   - Disoriented to: place and time  Functional Communication   - Overall Communication Status: impaired   - Forms of Communication: delayed responses and verbal  Attention Span    - Attention: - difficulty attending to directions - difficulty dividing attention   - Attention impairment: agitation, distractibility, fatigue and reduced memory  Following Direction   - follows one step commands inconsistently  Memory   - impaired - decreased long term memory - decreased working memory - decreased short term memory  Safety Awareness/Insight   - impaired    Observation   Visually impaired; macular degeneration. Gets injections.    Strength  (out of 5 unless noted, standard test position unless noted)   Hip:    - Flexion:         Left: 3+         Right: 3+  Knee:    - Extension:         Left: 4         Right: 4  Ankle:    - Dorsiflexion:         Left: 5         Right: 5      Sitting Balance  (CLARICE = base of support)  Static      - Trial 1 details: stand by assist  Dynamic      - Trial 1 details: stand by assist and contact guard    Standing Balance  (CLARICE = base of support)  Firm Surface: Double Leg      - Static, Eyes Open       - Trial 1 details: contact guard and minimal assist  Firm Surface: Single Leg     - Left, Eyes Open details: moderate assist and minmal assist      Coordination  LLE:     - Heel-Shin: intact     - Rapid Alternating Movement Test: toe tapping:  intact (slow)  RLE:     - Heel-Shin: intact     - Rapid Alternating Movement Test: toe tapping: intact (slow)    Proprioception    - LLE: intact   - RLE: intact  Kinesthesia    - LUE: intact   - RUE: intact    Bed Mobility  - Rolling left: stand by assist  - Rolling right: stand by assist  - Repositioning in bed: stand by assist  - Side-lying to sit: stand by assist  - Sit to side-lying: stand by assist  - Supine to sit: stand by assist  - Sit to supine: stand by assist  Moves slowly.   Transfers  Assistive devices: none, gait belt, hand hold  - Sit to stand: contact guard/touching/steadying assist  - Stand to sit: contact guard/touching/steadying assist  - Stand pivot: minimal assist       - Second Trial: contact guard/touching/steadying assist, minimal assist (with RW.)  - Squat pivot: contact guard/touching/steadying assist  - Car: minimal assist    Ambulation / Gait  - Assistive device: gait belt and 2-wheeled walker  - Distance (feet unless otherwise indicated): 150  - Assist Level: contact guard/touching/steadying assist and minimal assist  - Surface: even and incline/decline  - Description: decreased joshua/pace and forward flexed at hips    Ambulation / Gait (additional trials)  - Assist Level: minimal assist  - Assistive device: none and hand hold  - Distance (feet unless otherwise indicated): 50; 150; 150  - Description: decreased joshua/pace, forward flexed at hips, step through and unsteady  - Surface: even    Stair Ambulation  - Number of steps: 12;   - Assist Level: minimal assist  - Rails: bilateral  - Pattern: reciprocal  - Devices Used: none  Curb Step Ambulation  - Assist Level: gait belt used and minimal assist  - Pattern: ascend either left or right  - Assistive Device: 2-wheeled walker  - Description: decreased eccentric control    Wheelchair Mobility  - Type: manual  - Propel Method: left upper extremity and right upper extremity  Level Surfaces  - assist: stand by assist  - distance:  50'    Interventions  Skilled input: verbal instruction/cues and tactile instruction/cues  Verbal Consent: Writer verbally educated and received verbal consent for hand placement, positioning of patient, and techniques to be performed today from patient for clothing adjustments for techniques as described above and how they are pertinent to the patient's plan of care.  Home Exercise Program/Education Materials: HEP development initiated.          ASSESSMENT   Impairments: activity tolerance, balance, cognition, decreased insight into deficits, emotional/psychological, safety awareness and strength  Functional Limitations: all functional mobility  The patient presents to the inpatient rehabilitation program for PT s/p fall out of bed; head trauma; acute encephalopathy; Hx of seizures and or vascular impairment - 6 years. Currently alert, attentive with cuing; cooperative and fairly motivated. Fatigued this PM session. On evaluation, the patient is currently Minimum assist with functional mobility at the ambulatory level; household distances distances with HHA/none or RW. Patient likes the RW due to fatigue. STM and remote memory are impaired. Carryover of new information and skills is fair. The patient is able to follow single step commands with minimal cuing. The patient requires the intensity of inpatient rehab to address functional deficits and underlying impairments as noted. Easily agitated at times; requires increased time to complete tasks.  Primary limiting factors/barriers to discharge are: Complex co-morbidities - Hx of seizures; cognition, weakness, balance and unsteady gait and visual impairment.    Estimated length of stay is 10-14 days. The patient will benefit from continued skilled PT to address these deficits with the discharge goals of SBA/Supervision assist with all mobility. Lives with good family support - .  has been assisting patient with all mobility and care for the past 6  months (+) per 's report; as needed. The prognosis for goal achievement is fair/good with medical stability.    Recommended Consults: PT: Physical Medicine and Rehabilitation Physician    Discharge Recommendations  Recommendation for Discharge: PT IL: Patient requires 24 HOUR assistance to perform mobility and/or ADLs safely, Patient is appropriate for Physical Therapy 1-3 times per week  PT/OT Mobility Equipment for Discharge: TBD.  PT/OT ADL Equipment for Discharge: TBD.  PT Identified Barriers to Discharge: Vision; chronic seizures and fall risk; weakness; cognition.         Skilled therapy Patient will benefit from skilled therapy to address listed impairments and functional limitations.      Predicted patient presentation: Moderate (evolving) - Patient comorbidities and complexities, as defined above, may have varying impact on steady progress for prescribed plan of care.    Therapy Participation: This patient participated in all scheduled physical therapy time this session.    Education:   - Present and ready to learn: patient  Education provided during session:  - HEP. Seizures.   - Results of above outlined education: Verbalizes understanding, Demonstrates understanding and Needs reinforcement    Patient at End of Session:   Location: in chair  Safety measures: alarm system in place/re-engaged and call light within reach  Handoff to: nurse assistant and family/caregiver    PLAN   Suggestions for next session as indicated: PT Frequency: 5 days/week, 6 days/week   60-90 minutes.   Duration: 10-14 days.   Interventions: balance, bed mobility, cognitive reorientation (or training), endurance training, functional transfer training, gait training, HEP train/position, patient/family training, safety education, stairs retraining and strengthening  Agreement to plan and goals: patient agrees with goals and treatment plan      GOALS  Short Term Goals (STGs): to be met 7 days from date established, unless  otherwise stated.  STG = LTG.  Long Term Goals (LTGs): to be met by discharge from rehab program.   - Patient will complete all bed mobility at modified Independent level.   - Patient will perform sit to/from stand at supervision level.   - Patient will perform stand pivot transfers at supervision level with no device.   - Patient will ambulate 300 feet at stand by assist level with no device.   - Patient will negotiate 12 stairs at stand by assist level with no device and 1 railings.   - SBA with HEP.   Documented in the chart in the following areas: Prior Level of Function. Assessment/Plan.      Therapy procedure time and total treatment time can be found documented on the Time Entry flowsheet   Na/K/Mg WNL, TC/TG/LDL WNL, , A1C 6.0%

## 2024-10-15 ENCOUNTER — APPOINTMENT (OUTPATIENT)
Dept: INTERNAL MEDICINE | Facility: CLINIC | Age: 59
End: 2024-10-15
Payer: COMMERCIAL

## 2024-10-15 ENCOUNTER — NON-APPOINTMENT (OUTPATIENT)
Age: 59
End: 2024-10-15

## 2024-10-15 VITALS
WEIGHT: 250 LBS | DIASTOLIC BLOOD PRESSURE: 76 MMHG | BODY MASS INDEX: 34.87 KG/M2 | SYSTOLIC BLOOD PRESSURE: 159 MMHG | HEART RATE: 73 BPM

## 2024-10-15 DIAGNOSIS — I10 ESSENTIAL (PRIMARY) HYPERTENSION: ICD-10-CM

## 2024-10-15 DIAGNOSIS — Z12.83 ENCOUNTER FOR SCREENING FOR MALIGNANT NEOPLASM OF SKIN: ICD-10-CM

## 2024-10-15 DIAGNOSIS — I63.111: ICD-10-CM

## 2024-10-15 DIAGNOSIS — Z12.11 ENCOUNTER FOR SCREENING FOR MALIGNANT NEOPLASM OF COLON: ICD-10-CM

## 2024-10-15 PROCEDURE — G0444 DEPRESSION SCREEN ANNUAL: CPT | Mod: 59

## 2024-10-15 PROCEDURE — 93000 ELECTROCARDIOGRAM COMPLETE: CPT | Mod: 59

## 2024-10-15 PROCEDURE — 99386 PREV VISIT NEW AGE 40-64: CPT | Mod: 25

## 2024-10-15 PROCEDURE — 99214 OFFICE O/P EST MOD 30 MIN: CPT | Mod: 25

## 2024-10-16 DIAGNOSIS — E78.5 HYPERLIPIDEMIA, UNSPECIFIED: ICD-10-CM

## 2024-10-16 LAB
ALBUMIN SERPL ELPH-MCNC: 4.3 G/DL
ALP BLD-CCNC: 106 U/L
ALT SERPL-CCNC: 16 U/L
ANION GAP SERPL CALC-SCNC: 13 MMOL/L
AST SERPL-CCNC: 20 U/L
BASOPHILS # BLD AUTO: 0.02 K/UL
BASOPHILS NFR BLD AUTO: 0.3 %
BILIRUB SERPL-MCNC: 0.4 MG/DL
BUN SERPL-MCNC: 10 MG/DL
CALCIUM SERPL-MCNC: 9.4 MG/DL
CHLORIDE SERPL-SCNC: 101 MMOL/L
CHOLEST SERPL-MCNC: 257 MG/DL
CO2 SERPL-SCNC: 25 MMOL/L
CREAT SERPL-MCNC: 1.03 MG/DL
EGFR: 85 ML/MIN/1.73M2
EOSINOPHIL # BLD AUTO: 0.09 K/UL
EOSINOPHIL NFR BLD AUTO: 1.1 %
ESTIMATED AVERAGE GLUCOSE: 120 MG/DL
GLUCOSE SERPL-MCNC: 111 MG/DL
HBA1C MFR BLD HPLC: 5.8 %
HCT VFR BLD CALC: 46.7 %
HDLC SERPL-MCNC: 53 MG/DL
HGB BLD-MCNC: 15.6 G/DL
IMM GRANULOCYTES NFR BLD AUTO: 0.1 %
LDLC SERPL CALC-MCNC: 181 MG/DL
LYMPHOCYTES # BLD AUTO: 2.23 K/UL
LYMPHOCYTES NFR BLD AUTO: 27.9 %
MAN DIFF?: NORMAL
MCHC RBC-ENTMCNC: 29.5 PG
MCHC RBC-ENTMCNC: 33.4 GM/DL
MCV RBC AUTO: 88.4 FL
MONOCYTES # BLD AUTO: 0.75 K/UL
MONOCYTES NFR BLD AUTO: 9.4 %
NEUTROPHILS # BLD AUTO: 4.88 K/UL
NEUTROPHILS NFR BLD AUTO: 61.2 %
NONHDLC SERPL-MCNC: 204 MG/DL
PLATELET # BLD AUTO: 234 K/UL
POTASSIUM SERPL-SCNC: 4.1 MMOL/L
PROT SERPL-MCNC: 6.8 G/DL
RBC # BLD: 5.28 M/UL
RBC # FLD: 12.5 %
SODIUM SERPL-SCNC: 140 MMOL/L
TRIGL SERPL-MCNC: 130 MG/DL
TSH SERPL-ACNC: 2.15 UIU/ML
WBC # FLD AUTO: 7.98 K/UL

## 2024-10-16 RX ORDER — ATORVASTATIN CALCIUM 80 MG/1
80 TABLET, FILM COATED ORAL
Qty: 90 | Refills: 2 | Status: ACTIVE | COMMUNITY
Start: 2024-10-16 | End: 1900-01-01

## 2024-10-24 ENCOUNTER — APPOINTMENT (OUTPATIENT)
Dept: GASTROENTEROLOGY | Facility: CLINIC | Age: 59
End: 2024-10-24

## 2024-11-12 ENCOUNTER — APPOINTMENT (OUTPATIENT)
Dept: DERMATOLOGY | Facility: CLINIC | Age: 59
End: 2024-11-12
Payer: MEDICAID

## 2024-11-12 VITALS — WEIGHT: 250 LBS | HEIGHT: 71 IN | BODY MASS INDEX: 35 KG/M2

## 2024-11-12 DIAGNOSIS — D48.9 NEOPLASM OF UNCERTAIN BEHAVIOR, UNSPECIFIED: ICD-10-CM

## 2024-11-12 DIAGNOSIS — L82.1 OTHER SEBORRHEIC KERATOSIS: ICD-10-CM

## 2024-11-12 PROCEDURE — 99203 OFFICE O/P NEW LOW 30 MIN: CPT

## 2024-12-02 ENCOUNTER — APPOINTMENT (OUTPATIENT)
Dept: SURGERY | Facility: CLINIC | Age: 59
End: 2024-12-02
Payer: MEDICAID

## 2024-12-02 VITALS
TEMPERATURE: 97.6 F | OXYGEN SATURATION: 96 % | DIASTOLIC BLOOD PRESSURE: 96 MMHG | HEIGHT: 71 IN | BODY MASS INDEX: 35.08 KG/M2 | WEIGHT: 250.56 LBS | SYSTOLIC BLOOD PRESSURE: 183 MMHG | HEART RATE: 68 BPM

## 2024-12-02 DIAGNOSIS — I10 ESSENTIAL (PRIMARY) HYPERTENSION: ICD-10-CM

## 2024-12-02 DIAGNOSIS — Z82.3 FAMILY HISTORY OF STROKE: ICD-10-CM

## 2024-12-02 DIAGNOSIS — Z83.438 FAMILY HISTORY OF OTHER DISORDER OF LIPOPROTEIN METABOLISM AND OTHER LIPIDEMIA: ICD-10-CM

## 2024-12-02 DIAGNOSIS — R51.9 HEADACHE, UNSPECIFIED: ICD-10-CM

## 2024-12-02 DIAGNOSIS — I71.20 THORACIC AORTIC ANEURYSM, WITHOUT RUPTURE, UNSPECIFIED: ICD-10-CM

## 2024-12-02 DIAGNOSIS — Z98.890 OTHER SPECIFIED POSTPROCEDURAL STATES: ICD-10-CM

## 2024-12-02 DIAGNOSIS — Z80.8 FAMILY HISTORY OF MALIGNANT NEOPLASM OF OTHER ORGANS OR SYSTEMS: ICD-10-CM

## 2024-12-02 DIAGNOSIS — D48.9 NEOPLASM OF UNCERTAIN BEHAVIOR, UNSPECIFIED: ICD-10-CM

## 2024-12-02 DIAGNOSIS — G47.33 OBSTRUCTIVE SLEEP APNEA (ADULT) (PEDIATRIC): ICD-10-CM

## 2024-12-02 DIAGNOSIS — I25.10 ATHEROSCLEROTIC HEART DISEASE OF NATIVE CORONARY ARTERY W/OUT ANGINA PECTORIS: ICD-10-CM

## 2024-12-02 DIAGNOSIS — F12.91 CANNABIS USE, UNSPECIFIED, IN REMISSION: ICD-10-CM

## 2024-12-02 DIAGNOSIS — I63.111: ICD-10-CM

## 2024-12-02 DIAGNOSIS — I69.393 ATAXIA FOLLOWING CEREBRAL INFARCTION: ICD-10-CM

## 2024-12-02 DIAGNOSIS — E78.5 HYPERLIPIDEMIA, UNSPECIFIED: ICD-10-CM

## 2024-12-02 DIAGNOSIS — I69.391 DYSPHAGIA FOLLOWING CEREBRAL INFARCTION: ICD-10-CM

## 2024-12-02 DIAGNOSIS — K21.9 GASTRO-ESOPHAGEAL REFLUX DISEASE W/OUT ESOPHAGITIS: ICD-10-CM

## 2024-12-02 DIAGNOSIS — Z83.3 FAMILY HISTORY OF DIABETES MELLITUS: ICD-10-CM

## 2024-12-02 DIAGNOSIS — Z82.49 FAMILY HISTORY OF ISCHEMIC HEART DISEASE AND OTHER DISEASES OF THE CIRCULATORY SYSTEM: ICD-10-CM

## 2024-12-02 PROCEDURE — G2211 COMPLEX E/M VISIT ADD ON: CPT | Mod: NC

## 2024-12-02 PROCEDURE — 99203 OFFICE O/P NEW LOW 30 MIN: CPT

## 2025-01-22 ENCOUNTER — NON-APPOINTMENT (OUTPATIENT)
Age: 60
End: 2025-01-22

## 2025-01-22 ENCOUNTER — APPOINTMENT (OUTPATIENT)
Dept: INTERNAL MEDICINE | Facility: CLINIC | Age: 60
End: 2025-01-22
Payer: MEDICAID

## 2025-01-22 VITALS
WEIGHT: 251 LBS | DIASTOLIC BLOOD PRESSURE: 91 MMHG | SYSTOLIC BLOOD PRESSURE: 152 MMHG | HEIGHT: 71 IN | BODY MASS INDEX: 35.14 KG/M2 | TEMPERATURE: 98 F | HEART RATE: 70 BPM | OXYGEN SATURATION: 99 %

## 2025-01-22 VITALS — DIASTOLIC BLOOD PRESSURE: 80 MMHG | SYSTOLIC BLOOD PRESSURE: 150 MMHG

## 2025-01-22 DIAGNOSIS — I10 ESSENTIAL (PRIMARY) HYPERTENSION: ICD-10-CM

## 2025-01-22 DIAGNOSIS — E78.5 HYPERLIPIDEMIA, UNSPECIFIED: ICD-10-CM

## 2025-01-22 PROCEDURE — 99213 OFFICE O/P EST LOW 20 MIN: CPT | Mod: 25

## 2025-01-22 RX ORDER — LOSARTAN POTASSIUM 25 MG/1
25 TABLET, FILM COATED ORAL DAILY
Qty: 30 | Refills: 1 | Status: ACTIVE | COMMUNITY
Start: 2025-01-22 | End: 1900-01-01

## 2025-01-24 LAB
CHOLEST SERPL-MCNC: 244 MG/DL
CREAT SPEC-SCNC: 68 MG/DL
HDLC SERPL-MCNC: 49 MG/DL
LDLC SERPL CALC-MCNC: 174 MG/DL
MICROALBUMIN 24H UR DL<=1MG/L-MCNC: <1.2 MG/DL
MICROALBUMIN/CREAT 24H UR-RTO: NORMAL MG/G
NONHDLC SERPL-MCNC: 195 MG/DL
TRIGL SERPL-MCNC: 115 MG/DL

## 2025-01-27 ENCOUNTER — APPOINTMENT (OUTPATIENT)
Dept: GASTROENTEROLOGY | Facility: CLINIC | Age: 60
End: 2025-01-27
Payer: MEDICAID

## 2025-01-27 VITALS
DIASTOLIC BLOOD PRESSURE: 86 MMHG | HEART RATE: 83 BPM | BODY MASS INDEX: 35.9 KG/M2 | RESPIRATION RATE: 15 BRPM | SYSTOLIC BLOOD PRESSURE: 158 MMHG | WEIGHT: 256.4 LBS | HEIGHT: 71 IN | OXYGEN SATURATION: 99 % | TEMPERATURE: 97.1 F

## 2025-01-27 DIAGNOSIS — Z12.11 ENCOUNTER FOR SCREENING FOR MALIGNANT NEOPLASM OF COLON: ICD-10-CM

## 2025-01-27 PROCEDURE — 99203 OFFICE O/P NEW LOW 30 MIN: CPT

## 2025-02-05 ENCOUNTER — APPOINTMENT (OUTPATIENT)
Dept: INTERNAL MEDICINE | Facility: CLINIC | Age: 60
End: 2025-02-05
Payer: MEDICAID

## 2025-02-05 VITALS
WEIGHT: 257 LBS | HEART RATE: 60 BPM | BODY MASS INDEX: 35.98 KG/M2 | SYSTOLIC BLOOD PRESSURE: 158 MMHG | DIASTOLIC BLOOD PRESSURE: 96 MMHG | TEMPERATURE: 95.6 F | HEIGHT: 71 IN | OXYGEN SATURATION: 99 %

## 2025-02-05 VITALS — SYSTOLIC BLOOD PRESSURE: 140 MMHG | DIASTOLIC BLOOD PRESSURE: 90 MMHG

## 2025-02-05 DIAGNOSIS — R94.31 ABNORMAL ELECTROCARDIOGRAM [ECG] [EKG]: ICD-10-CM

## 2025-02-05 DIAGNOSIS — I10 ESSENTIAL (PRIMARY) HYPERTENSION: ICD-10-CM

## 2025-02-05 DIAGNOSIS — Z98.890 OTHER SPECIFIED POSTPROCEDURAL STATES: ICD-10-CM

## 2025-02-05 PROCEDURE — G2211 COMPLEX E/M VISIT ADD ON: CPT | Mod: NC

## 2025-02-05 PROCEDURE — 99214 OFFICE O/P EST MOD 30 MIN: CPT

## 2025-02-07 PROBLEM — R94.31 ABNORMAL EKG: Status: ACTIVE | Noted: 2025-02-07

## 2025-02-10 ENCOUNTER — NON-APPOINTMENT (OUTPATIENT)
Age: 60
End: 2025-02-10

## 2025-02-10 ENCOUNTER — APPOINTMENT (OUTPATIENT)
Dept: HEART AND VASCULAR | Facility: CLINIC | Age: 60
End: 2025-02-10
Payer: MEDICAID

## 2025-02-10 VITALS
WEIGHT: 255 LBS | SYSTOLIC BLOOD PRESSURE: 140 MMHG | OXYGEN SATURATION: 98 % | TEMPERATURE: 96.4 F | BODY MASS INDEX: 35.7 KG/M2 | HEART RATE: 71 BPM | DIASTOLIC BLOOD PRESSURE: 80 MMHG | HEIGHT: 71 IN

## 2025-02-10 DIAGNOSIS — I25.10 ATHEROSCLEROTIC HEART DISEASE OF NATIVE CORONARY ARTERY W/OUT ANGINA PECTORIS: ICD-10-CM

## 2025-02-10 DIAGNOSIS — I10 ESSENTIAL (PRIMARY) HYPERTENSION: ICD-10-CM

## 2025-02-10 DIAGNOSIS — E78.5 HYPERLIPIDEMIA, UNSPECIFIED: ICD-10-CM

## 2025-02-10 DIAGNOSIS — I63.111: ICD-10-CM

## 2025-02-10 PROCEDURE — 93246 EXT ECG>7D<15D RECORDING: CPT

## 2025-02-10 PROCEDURE — 99203 OFFICE O/P NEW LOW 30 MIN: CPT | Mod: 25

## 2025-02-10 PROCEDURE — 36415 COLL VENOUS BLD VENIPUNCTURE: CPT

## 2025-02-10 PROCEDURE — 93000 ELECTROCARDIOGRAM COMPLETE: CPT

## 2025-02-10 RX ORDER — LOSARTAN POTASSIUM 100 MG/1
100 TABLET, FILM COATED ORAL DAILY
Qty: 90 | Refills: 3 | Status: ACTIVE | COMMUNITY
Start: 2025-02-10 | End: 1900-01-01

## 2025-02-10 RX ORDER — ROSUVASTATIN CALCIUM 20 MG/1
20 TABLET, FILM COATED ORAL DAILY
Qty: 60 | Refills: 0 | Status: ACTIVE | COMMUNITY
Start: 2025-02-10 | End: 1900-01-01

## 2025-02-12 LAB — APO LP(A) SERPL-MCNC: 42.2 NMOL/L

## 2025-02-26 ENCOUNTER — APPOINTMENT (OUTPATIENT)
Dept: INTERNAL MEDICINE | Facility: CLINIC | Age: 60
End: 2025-02-26
Payer: MEDICAID

## 2025-02-26 DIAGNOSIS — M79.673 PAIN IN UNSPECIFIED FOOT: ICD-10-CM

## 2025-02-26 PROCEDURE — G2211 COMPLEX E/M VISIT ADD ON: CPT | Mod: NC,95

## 2025-02-26 PROCEDURE — 99212 OFFICE O/P EST SF 10 MIN: CPT | Mod: 95

## 2025-02-27 PROCEDURE — 93248 EXT ECG>7D<15D REV&INTERPJ: CPT

## 2025-02-28 ENCOUNTER — NON-APPOINTMENT (OUTPATIENT)
Age: 60
End: 2025-02-28

## 2025-02-28 ENCOUNTER — APPOINTMENT (OUTPATIENT)
Dept: NEUROLOGY | Facility: CLINIC | Age: 60
End: 2025-02-28
Payer: MEDICAID

## 2025-02-28 VITALS
HEART RATE: 78 BPM | DIASTOLIC BLOOD PRESSURE: 88 MMHG | BODY MASS INDEX: 35.7 KG/M2 | SYSTOLIC BLOOD PRESSURE: 164 MMHG | OXYGEN SATURATION: 97 % | HEIGHT: 71 IN | WEIGHT: 255 LBS

## 2025-02-28 DIAGNOSIS — I63.111: ICD-10-CM

## 2025-02-28 DIAGNOSIS — I69.391 DYSPHAGIA FOLLOWING CEREBRAL INFARCTION: ICD-10-CM

## 2025-02-28 DIAGNOSIS — R20.9 OTHER SEQUELAE OF CEREBRAL INFARCTION: ICD-10-CM

## 2025-02-28 DIAGNOSIS — I69.398 OTHER SEQUELAE OF CEREBRAL INFARCTION: ICD-10-CM

## 2025-02-28 DIAGNOSIS — R55 SYNCOPE AND COLLAPSE: ICD-10-CM

## 2025-02-28 DIAGNOSIS — I69.393 ATAXIA FOLLOWING CEREBRAL INFARCTION: ICD-10-CM

## 2025-02-28 PROCEDURE — 99205 OFFICE O/P NEW HI 60 MIN: CPT

## 2025-03-10 ENCOUNTER — APPOINTMENT (OUTPATIENT)
Dept: HEART AND VASCULAR | Facility: CLINIC | Age: 60
End: 2025-03-10
Payer: MEDICAID

## 2025-03-10 ENCOUNTER — NON-APPOINTMENT (OUTPATIENT)
Age: 60
End: 2025-03-10

## 2025-03-10 VITALS
HEIGHT: 71 IN | HEART RATE: 72 BPM | BODY MASS INDEX: 36.68 KG/M2 | OXYGEN SATURATION: 98 % | SYSTOLIC BLOOD PRESSURE: 140 MMHG | TEMPERATURE: 98.5 F | WEIGHT: 262 LBS | DIASTOLIC BLOOD PRESSURE: 96 MMHG

## 2025-03-10 VITALS — SYSTOLIC BLOOD PRESSURE: 159 MMHG | DIASTOLIC BLOOD PRESSURE: 99 MMHG

## 2025-03-10 VITALS — DIASTOLIC BLOOD PRESSURE: 100 MMHG | SYSTOLIC BLOOD PRESSURE: 155 MMHG

## 2025-03-10 PROCEDURE — 99214 OFFICE O/P EST MOD 30 MIN: CPT

## 2025-03-10 RX ORDER — LOSARTAN POTASSIUM 100 MG/1
100 TABLET, FILM COATED ORAL DAILY
Qty: 90 | Refills: 3 | Status: DISCONTINUED | COMMUNITY
Start: 2025-03-10 | End: 2025-03-10

## 2025-03-10 RX ORDER — EVOLOCUMAB 140 MG/ML
140 INJECTION, SOLUTION SUBCUTANEOUS
Qty: 1 | Refills: 3 | Status: ACTIVE | COMMUNITY
Start: 2025-03-10 | End: 1900-01-01

## 2025-03-10 RX ORDER — ASPIRIN 81 MG/1
81 TABLET, CHEWABLE ORAL DAILY
Qty: 90 | Refills: 3 | Status: ACTIVE | COMMUNITY
Start: 2025-03-10 | End: 1900-01-01

## 2025-03-10 RX ORDER — LOSARTAN POTASSIUM AND HYDROCHLOROTHIAZIDE 25; 100 MG/1; MG/1
100-25 TABLET ORAL DAILY
Qty: 90 | Refills: 2 | Status: ACTIVE | COMMUNITY
Start: 2025-03-10 | End: 1900-01-01

## 2025-03-18 ENCOUNTER — APPOINTMENT (OUTPATIENT)
Dept: NEUROLOGY | Facility: CLINIC | Age: 60
End: 2025-03-18
Payer: MEDICAID

## 2025-03-18 PROCEDURE — 93886 INTRACRANIAL COMPLETE STUDY: CPT

## 2025-03-24 ENCOUNTER — APPOINTMENT (OUTPATIENT)
Dept: NEUROLOGY | Facility: CLINIC | Age: 60
End: 2025-03-24
Payer: MEDICAID

## 2025-03-24 PROCEDURE — 95816 EEG AWAKE AND DROWSY: CPT

## 2025-03-27 ENCOUNTER — TRANSCRIPTION ENCOUNTER (OUTPATIENT)
Age: 60
End: 2025-03-27

## 2025-03-28 ENCOUNTER — TRANSCRIPTION ENCOUNTER (OUTPATIENT)
Age: 60
End: 2025-03-28

## 2025-03-31 ENCOUNTER — TRANSCRIPTION ENCOUNTER (OUTPATIENT)
Age: 60
End: 2025-03-31

## 2025-03-31 ENCOUNTER — NON-APPOINTMENT (OUTPATIENT)
Age: 60
End: 2025-03-31

## 2025-04-01 ENCOUNTER — NON-APPOINTMENT (OUTPATIENT)
Age: 60
End: 2025-04-01

## 2025-04-01 ENCOUNTER — TRANSCRIPTION ENCOUNTER (OUTPATIENT)
Age: 60
End: 2025-04-01

## 2025-04-03 ENCOUNTER — TRANSCRIPTION ENCOUNTER (OUTPATIENT)
Age: 60
End: 2025-04-03

## 2025-04-04 ENCOUNTER — TRANSCRIPTION ENCOUNTER (OUTPATIENT)
Age: 60
End: 2025-04-04

## 2025-04-07 ENCOUNTER — APPOINTMENT (OUTPATIENT)
Dept: HEART AND VASCULAR | Facility: CLINIC | Age: 60
End: 2025-04-07
Payer: MEDICAID

## 2025-04-07 VITALS — DIASTOLIC BLOOD PRESSURE: 80 MMHG | SYSTOLIC BLOOD PRESSURE: 138 MMHG

## 2025-04-07 PROCEDURE — 93306 TTE W/DOPPLER COMPLETE: CPT

## 2025-04-10 ENCOUNTER — OUTPATIENT (OUTPATIENT)
Dept: OUTPATIENT SERVICES | Facility: HOSPITAL | Age: 60
LOS: 1 days | End: 2025-04-10

## 2025-04-10 ENCOUNTER — TRANSCRIPTION ENCOUNTER (OUTPATIENT)
Age: 60
End: 2025-04-10

## 2025-04-10 ENCOUNTER — APPOINTMENT (OUTPATIENT)
Dept: MRI IMAGING | Facility: HOSPITAL | Age: 60
End: 2025-04-10
Payer: MEDICAID

## 2025-04-10 DIAGNOSIS — I63.111: ICD-10-CM

## 2025-04-10 DIAGNOSIS — Z98.890 OTHER SPECIFIED POSTPROCEDURAL STATES: Chronic | ICD-10-CM

## 2025-04-10 DIAGNOSIS — I65.1 OCCLUSION AND STENOSIS OF BASILAR ARTERY: ICD-10-CM

## 2025-04-10 DIAGNOSIS — N62 HYPERTROPHY OF BREAST: Chronic | ICD-10-CM

## 2025-04-10 PROCEDURE — 70551 MRI BRAIN STEM W/O DYE: CPT

## 2025-04-10 PROCEDURE — 70544 MR ANGIOGRAPHY HEAD W/O DYE: CPT | Mod: 26,59

## 2025-04-10 PROCEDURE — 70544 MR ANGIOGRAPHY HEAD W/O DYE: CPT

## 2025-04-10 PROCEDURE — 70551 MRI BRAIN STEM W/O DYE: CPT | Mod: 26

## 2025-04-10 RX ORDER — CLOPIDOGREL BISULFATE 75 MG/1
75 TABLET, FILM COATED ORAL
Qty: 90 | Refills: 1 | Status: ACTIVE | COMMUNITY
Start: 2025-04-10 | End: 1900-01-01

## 2025-04-10 RX ORDER — DIAZEPAM 2 MG/1
2 TABLET ORAL
Qty: 1 | Refills: 0 | Status: ACTIVE | COMMUNITY
Start: 2025-04-10 | End: 1900-01-01

## 2025-04-14 ENCOUNTER — TRANSCRIPTION ENCOUNTER (OUTPATIENT)
Age: 60
End: 2025-04-14

## 2025-04-14 RX ORDER — ATORVASTATIN CALCIUM 40 MG/1
40 TABLET, FILM COATED ORAL
Qty: 90 | Refills: 1 | Status: ACTIVE | COMMUNITY
Start: 2025-04-10 | End: 1900-01-01

## 2025-04-18 ENCOUNTER — TRANSCRIPTION ENCOUNTER (OUTPATIENT)
Age: 60
End: 2025-04-18

## 2025-04-21 ENCOUNTER — TRANSCRIPTION ENCOUNTER (OUTPATIENT)
Age: 60
End: 2025-04-21

## 2025-04-22 ENCOUNTER — TRANSCRIPTION ENCOUNTER (OUTPATIENT)
Age: 60
End: 2025-04-22

## 2025-04-25 ENCOUNTER — APPOINTMENT (OUTPATIENT)
Dept: MRI IMAGING | Facility: HOSPITAL | Age: 60
End: 2025-04-25

## 2025-04-25 ENCOUNTER — OUTPATIENT (OUTPATIENT)
Dept: OUTPATIENT SERVICES | Facility: HOSPITAL | Age: 60
LOS: 1 days | End: 2025-04-25
Payer: MEDICAID

## 2025-04-25 DIAGNOSIS — Z98.890 OTHER SPECIFIED POSTPROCEDURAL STATES: Chronic | ICD-10-CM

## 2025-04-25 DIAGNOSIS — N62 HYPERTROPHY OF BREAST: Chronic | ICD-10-CM

## 2025-04-25 PROCEDURE — 70544 MR ANGIOGRAPHY HEAD W/O DYE: CPT | Mod: 26

## 2025-04-25 PROCEDURE — 70544 MR ANGIOGRAPHY HEAD W/O DYE: CPT

## 2025-04-29 ENCOUNTER — TRANSCRIPTION ENCOUNTER (OUTPATIENT)
Age: 60
End: 2025-04-29

## 2025-04-29 DIAGNOSIS — I77.75 DISSECTION OF OTHER PRECEREBRAL ARTERIES: ICD-10-CM

## 2025-04-30 ENCOUNTER — TRANSCRIPTION ENCOUNTER (OUTPATIENT)
Age: 60
End: 2025-04-30

## 2025-06-06 ENCOUNTER — TRANSCRIPTION ENCOUNTER (OUTPATIENT)
Age: 60
End: 2025-06-06

## 2025-06-09 ENCOUNTER — TRANSCRIPTION ENCOUNTER (OUTPATIENT)
Age: 60
End: 2025-06-09

## 2025-06-10 ENCOUNTER — TRANSCRIPTION ENCOUNTER (OUTPATIENT)
Age: 60
End: 2025-06-10

## 2025-06-12 ENCOUNTER — TRANSCRIPTION ENCOUNTER (OUTPATIENT)
Age: 60
End: 2025-06-12

## 2025-06-16 ENCOUNTER — APPOINTMENT (OUTPATIENT)
Dept: HEART AND VASCULAR | Facility: CLINIC | Age: 60
End: 2025-06-16
Payer: MEDICAID

## 2025-06-16 VITALS
OXYGEN SATURATION: 98 % | WEIGHT: 260 LBS | HEART RATE: 74 BPM | TEMPERATURE: 97.5 F | SYSTOLIC BLOOD PRESSURE: 130 MMHG | DIASTOLIC BLOOD PRESSURE: 81 MMHG | HEIGHT: 71 IN | BODY MASS INDEX: 36.4 KG/M2

## 2025-06-16 PROBLEM — I25.10 CAD (CORONARY ARTERY DISEASE): Status: ACTIVE | Noted: 2025-06-16

## 2025-06-16 PROBLEM — I65.29 CAROTID STENOSIS: Status: ACTIVE | Noted: 2025-06-16

## 2025-06-16 PROBLEM — E78.5 DYSLIPIDEMIA: Status: ACTIVE | Noted: 2025-06-16

## 2025-06-16 PROBLEM — I10 HYPERTENSION: Status: ACTIVE | Noted: 2025-06-16

## 2025-06-16 PROCEDURE — 36415 COLL VENOUS BLD VENIPUNCTURE: CPT

## 2025-06-16 PROCEDURE — 93000 ELECTROCARDIOGRAM COMPLETE: CPT

## 2025-06-16 PROCEDURE — 99213 OFFICE O/P EST LOW 20 MIN: CPT | Mod: 25

## 2025-06-16 RX ORDER — AMLODIPINE BESYLATE 5 MG/1
5 TABLET ORAL DAILY
Qty: 90 | Refills: 3 | Status: ACTIVE | COMMUNITY
Start: 2025-06-16 | End: 1900-01-01

## 2025-06-17 ENCOUNTER — TRANSCRIPTION ENCOUNTER (OUTPATIENT)
Age: 60
End: 2025-06-17

## 2025-06-17 LAB
ANION GAP SERPL CALC-SCNC: 13 MMOL/L
BASOPHILS # BLD AUTO: 0.02 K/UL
BASOPHILS NFR BLD AUTO: 0.3 %
BUN SERPL-MCNC: 22 MG/DL
CALCIUM SERPL-MCNC: 9.5 MG/DL
CHLORIDE SERPL-SCNC: 101 MMOL/L
CHOLEST SERPL-MCNC: 91 MG/DL
CO2 SERPL-SCNC: 27 MMOL/L
CREAT SERPL-MCNC: 1 MG/DL
EGFRCR SERPLBLD CKD-EPI 2021: 87 ML/MIN/1.73M2
EOSINOPHIL # BLD AUTO: 0.17 K/UL
EOSINOPHIL NFR BLD AUTO: 2.6 %
GLUCOSE SERPL-MCNC: 131 MG/DL
HCT VFR BLD CALC: 42.6 %
HDLC SERPL-MCNC: 55 MG/DL
HGB BLD-MCNC: 14.6 G/DL
IMM GRANULOCYTES NFR BLD AUTO: 0.2 %
LDLC SERPL-MCNC: 19 MG/DL
LYMPHOCYTES # BLD AUTO: 2.31 K/UL
LYMPHOCYTES NFR BLD AUTO: 35.3 %
MAN DIFF?: NORMAL
MCHC RBC-ENTMCNC: 30 PG
MCHC RBC-ENTMCNC: 34.3 G/DL
MCV RBC AUTO: 87.5 FL
MONOCYTES # BLD AUTO: 0.62 K/UL
MONOCYTES NFR BLD AUTO: 9.5 %
NEUTROPHILS # BLD AUTO: 3.41 K/UL
NEUTROPHILS NFR BLD AUTO: 52.1 %
NONHDLC SERPL-MCNC: 36 MG/DL
PLATELET # BLD AUTO: 220 K/UL
POTASSIUM SERPL-SCNC: 4.1 MMOL/L
RBC # BLD: 4.87 M/UL
RBC # FLD: 11.9 %
SODIUM SERPL-SCNC: 141 MMOL/L
TRIGL SERPL-MCNC: 81 MG/DL
WBC # FLD AUTO: 6.54 K/UL

## 2025-06-23 ENCOUNTER — TRANSCRIPTION ENCOUNTER (OUTPATIENT)
Age: 60
End: 2025-06-23

## 2025-06-24 ENCOUNTER — TRANSCRIPTION ENCOUNTER (OUTPATIENT)
Age: 60
End: 2025-06-24

## 2025-06-24 RX ORDER — DULOXETINE HYDROCHLORIDE 30 MG/1
30 CAPSULE, DELAYED RELEASE PELLETS ORAL
Qty: 30 | Refills: 3 | Status: ACTIVE | COMMUNITY
Start: 2025-06-24 | End: 1900-01-01

## 2025-07-07 ENCOUNTER — TRANSCRIPTION ENCOUNTER (OUTPATIENT)
Age: 60
End: 2025-07-07

## 2025-07-10 ENCOUNTER — TRANSCRIPTION ENCOUNTER (OUTPATIENT)
Age: 60
End: 2025-07-10

## 2025-07-11 ENCOUNTER — TRANSCRIPTION ENCOUNTER (OUTPATIENT)
Age: 60
End: 2025-07-11

## 2025-07-23 ENCOUNTER — OUTPATIENT (OUTPATIENT)
Dept: OUTPATIENT SERVICES | Facility: HOSPITAL | Age: 60
LOS: 1 days | End: 2025-07-23
Payer: MEDICAID

## 2025-07-23 ENCOUNTER — APPOINTMENT (OUTPATIENT)
Dept: MRI IMAGING | Facility: HOSPITAL | Age: 60
End: 2025-07-23

## 2025-07-23 ENCOUNTER — TRANSCRIPTION ENCOUNTER (OUTPATIENT)
Age: 60
End: 2025-07-23

## 2025-07-23 DIAGNOSIS — N62 HYPERTROPHY OF BREAST: Chronic | ICD-10-CM

## 2025-07-23 DIAGNOSIS — Z98.890 OTHER SPECIFIED POSTPROCEDURAL STATES: Chronic | ICD-10-CM

## 2025-07-23 PROCEDURE — 70544 MR ANGIOGRAPHY HEAD W/O DYE: CPT | Mod: 26

## 2025-07-23 PROCEDURE — 70547 MR ANGIOGRAPHY NECK W/O DYE: CPT | Mod: 26

## 2025-07-28 ENCOUNTER — TRANSCRIPTION ENCOUNTER (OUTPATIENT)
Age: 60
End: 2025-07-28

## 2025-07-29 ENCOUNTER — TRANSCRIPTION ENCOUNTER (OUTPATIENT)
Age: 60
End: 2025-07-29

## 2025-07-29 DIAGNOSIS — R41.89 OTHER SYMPTOMS AND SIGNS INVOLVING COGNITIVE FUNCTIONS AND AWARENESS: ICD-10-CM

## 2025-07-29 DIAGNOSIS — I77.75 DISSECTION OF OTHER PRECEREBRAL ARTERIES: ICD-10-CM

## 2025-07-29 DIAGNOSIS — I69.393 ATAXIA FOLLOWING CEREBRAL INFARCTION: ICD-10-CM

## 2025-07-31 ENCOUNTER — TRANSCRIPTION ENCOUNTER (OUTPATIENT)
Age: 60
End: 2025-07-31

## 2025-08-01 ENCOUNTER — TRANSCRIPTION ENCOUNTER (OUTPATIENT)
Age: 60
End: 2025-08-01

## 2025-08-11 ENCOUNTER — TRANSCRIPTION ENCOUNTER (OUTPATIENT)
Age: 60
End: 2025-08-11

## 2025-09-08 ENCOUNTER — APPOINTMENT (OUTPATIENT)
Dept: HEART AND VASCULAR | Facility: CLINIC | Age: 60
End: 2025-09-08
Payer: MEDICAID

## 2025-09-08 VITALS
TEMPERATURE: 98.3 F | BODY MASS INDEX: 35.7 KG/M2 | HEART RATE: 80 BPM | DIASTOLIC BLOOD PRESSURE: 74 MMHG | WEIGHT: 255 LBS | OXYGEN SATURATION: 97 % | HEIGHT: 71 IN | SYSTOLIC BLOOD PRESSURE: 106 MMHG

## 2025-09-08 PROCEDURE — 93000 ELECTROCARDIOGRAM COMPLETE: CPT

## 2025-09-08 PROCEDURE — 99214 OFFICE O/P EST MOD 30 MIN: CPT | Mod: 25

## 2025-09-11 RX ORDER — AMLODIPINE BESYLATE 5 MG/1
5 TABLET ORAL DAILY
Qty: 90 | Refills: 3 | Status: ACTIVE | COMMUNITY
Start: 1900-01-01 | End: 1900-01-01